# Patient Record
Sex: MALE | Race: WHITE | NOT HISPANIC OR LATINO | Employment: UNEMPLOYED | URBAN - NONMETROPOLITAN AREA
[De-identification: names, ages, dates, MRNs, and addresses within clinical notes are randomized per-mention and may not be internally consistent; named-entity substitution may affect disease eponyms.]

---

## 2017-10-07 ENCOUNTER — HOSPITAL ENCOUNTER (EMERGENCY)
Facility: HOSPITAL | Age: 37
Discharge: HOME | End: 2017-10-07
Attending: EMERGENCY MEDICINE | Admitting: NURSE PRACTITIONER

## 2017-10-07 VITALS
HEIGHT: 73 IN | BODY MASS INDEX: 32.47 KG/M2 | RESPIRATION RATE: 16 BRPM | DIASTOLIC BLOOD PRESSURE: 104 MMHG | WEIGHT: 245 LBS | OXYGEN SATURATION: 98 % | HEART RATE: 66 BPM | SYSTOLIC BLOOD PRESSURE: 152 MMHG | TEMPERATURE: 97.9 F

## 2017-10-07 DIAGNOSIS — M54.50 ACUTE MIDLINE LOW BACK PAIN WITHOUT SCIATICA: Primary | ICD-10-CM

## 2017-10-07 PROCEDURE — 96372 THER/PROPH/DIAG INJ SC/IM: CPT

## 2017-10-07 PROCEDURE — 25010000002 ORPHENADRINE CITRATE PER 60 MG: Performed by: NURSE PRACTITIONER

## 2017-10-07 PROCEDURE — 99283 EMERGENCY DEPT VISIT LOW MDM: CPT

## 2017-10-07 PROCEDURE — 25010000002 KETOROLAC TROMETHAMINE PER 15 MG: Performed by: NURSE PRACTITIONER

## 2017-10-07 RX ORDER — ORPHENADRINE CITRATE 30 MG/ML
60 INJECTION INTRAMUSCULAR; INTRAVENOUS ONCE
Status: COMPLETED | OUTPATIENT
Start: 2017-10-07 | End: 2017-10-07

## 2017-10-07 RX ORDER — ASPIRIN 81 MG/1
81 TABLET, CHEWABLE ORAL DAILY
COMMUNITY
End: 2019-01-03

## 2017-10-07 RX ORDER — ORPHENADRINE CITRATE 30 MG/ML
60 INJECTION INTRAMUSCULAR; INTRAVENOUS ONCE
Status: DISCONTINUED | OUTPATIENT
Start: 2017-10-07 | End: 2017-10-07

## 2017-10-07 RX ORDER — LISINOPRIL AND HYDROCHLOROTHIAZIDE 12.5; 1 MG/1; MG/1
1 TABLET ORAL DAILY
Qty: 15 TABLET | Refills: 0 | Status: SHIPPED | OUTPATIENT
Start: 2017-10-07 | End: 2021-08-18

## 2017-10-07 RX ORDER — LISINOPRIL AND HYDROCHLOROTHIAZIDE 12.5; 1 MG/1; MG/1
1 TABLET ORAL DAILY
COMMUNITY
End: 2019-01-03

## 2017-10-07 RX ORDER — NAPROXEN 500 MG/1
500 TABLET ORAL 2 TIMES DAILY PRN
Qty: 20 TABLET | Refills: 0 | OUTPATIENT
Start: 2017-10-07 | End: 2019-01-03

## 2017-10-07 RX ORDER — CYCLOBENZAPRINE HCL 10 MG
10 TABLET ORAL 3 TIMES DAILY PRN
Qty: 21 TABLET | Refills: 0 | OUTPATIENT
Start: 2017-10-07 | End: 2019-01-03

## 2017-10-07 RX ORDER — KETOROLAC TROMETHAMINE 30 MG/ML
60 INJECTION, SOLUTION INTRAMUSCULAR; INTRAVENOUS ONCE
Status: COMPLETED | OUTPATIENT
Start: 2017-10-07 | End: 2017-10-07

## 2017-10-07 RX ADMIN — ORPHENADRINE CITRATE 60 MG: 30 INJECTION INTRAMUSCULAR; INTRAVENOUS at 08:55

## 2017-10-07 RX ADMIN — KETOROLAC TROMETHAMINE 60 MG: 30 INJECTION, SOLUTION INTRAMUSCULAR at 08:55

## 2017-10-07 NOTE — ED PROVIDER NOTES
Subjective   Patient is a 37 y.o. male presenting with back pain.   History provided by:  Patient   used: No    Back Pain   Location:  Lumbar spine  Duration:  3 weeks  Context: lifting heavy objects    Relieved by:  Nothing  Worsened by:  Movement  Ineffective treatments:  OTC medications  Associated symptoms: no abdominal pain, no abdominal swelling, no bladder incontinence, no bowel incontinence, no chest pain, no fever, no headaches, no leg pain, no numbness, no paresthesias, no pelvic pain, no perianal numbness, no tingling, no weakness and no weight loss    Risk factors: no hx of cancer, no hx of osteoporosis, no lack of exercise, no menopause, not obese, not pregnant, no recent surgery, no steroid use and no vascular disease        Review of Systems   Constitutional: Negative.  Negative for fever and weight loss.   HENT: Negative.    Eyes: Negative.    Respiratory: Negative.    Cardiovascular: Negative.  Negative for chest pain.   Gastrointestinal: Negative.  Negative for abdominal pain and bowel incontinence.   Endocrine: Negative.    Genitourinary: Negative.  Negative for bladder incontinence and pelvic pain.   Musculoskeletal: Positive for back pain.   Allergic/Immunologic: Negative.    Neurological: Negative.  Negative for tingling, weakness, numbness, headaches and paresthesias.   Hematological: Negative.    Psychiatric/Behavioral: Negative.        Past Medical History:   Diagnosis Date   • Hypertension    • Knee pain, left        No Known Allergies    Past Surgical History:   Procedure Laterality Date   • CHOLECYSTECTOMY         Family History   Problem Relation Age of Onset   • Diabetes Brother    • Diabetes Mother        Social History     Social History   • Marital status: Single     Spouse name: N/A   • Number of children: N/A   • Years of education: N/A     Social History Main Topics   • Smoking status: Never Smoker   • Smokeless tobacco: Current User   • Alcohol use Yes       Comment: occasional    • Drug use: No   • Sexual activity: Defer     Other Topics Concern   • None     Social History Narrative   • None           Objective   Physical Exam   Constitutional: He is oriented to person, place, and time. He appears well-developed and well-nourished.   HENT:   Head: Normocephalic.   Right Ear: External ear normal.   Left Ear: External ear normal.   Mouth/Throat: Oropharynx is clear and moist.   Eyes: EOM are normal. Pupils are equal, round, and reactive to light.   Neck: Normal range of motion. Neck supple.   Cardiovascular: Normal rate and regular rhythm.    Pulmonary/Chest: Effort normal and breath sounds normal.   Abdominal: Soft. Bowel sounds are normal.   Musculoskeletal:        Lumbar back: He exhibits decreased range of motion, tenderness and pain.   Neurological: He is alert and oriented to person, place, and time.   Skin: Skin is warm and dry.   Psychiatric: He has a normal mood and affect. His behavior is normal.   Nursing note and vitals reviewed.      Procedures         ED Course  ED Course                  MDM    Final diagnoses:   Acute midline low back pain without sciatica            Quique Esquivel, APRN  10/07/17 0990

## 2019-01-03 ENCOUNTER — APPOINTMENT (OUTPATIENT)
Dept: CT IMAGING | Facility: HOSPITAL | Age: 39
End: 2019-01-03

## 2019-01-03 ENCOUNTER — HOSPITAL ENCOUNTER (EMERGENCY)
Facility: HOSPITAL | Age: 39
Discharge: HOME | End: 2019-01-03
Attending: FAMILY MEDICINE | Admitting: FAMILY MEDICINE

## 2019-01-03 VITALS
TEMPERATURE: 97.8 F | OXYGEN SATURATION: 99 % | HEIGHT: 72 IN | DIASTOLIC BLOOD PRESSURE: 85 MMHG | WEIGHT: 250 LBS | RESPIRATION RATE: 18 BRPM | SYSTOLIC BLOOD PRESSURE: 145 MMHG | BODY MASS INDEX: 33.86 KG/M2 | HEART RATE: 72 BPM

## 2019-01-03 DIAGNOSIS — M54.12 CERVICAL RADICULOPATHY: ICD-10-CM

## 2019-01-03 DIAGNOSIS — S16.1XXA STRAIN OF NECK MUSCLE, INITIAL ENCOUNTER: Primary | ICD-10-CM

## 2019-01-03 PROCEDURE — 72128 CT CHEST SPINE W/O DYE: CPT

## 2019-01-03 PROCEDURE — 72125 CT NECK SPINE W/O DYE: CPT | Performed by: RADIOLOGY

## 2019-01-03 PROCEDURE — 25010000002 KETOROLAC TROMETHAMINE PER 15 MG: Performed by: PHYSICIAN ASSISTANT

## 2019-01-03 PROCEDURE — 70450 CT HEAD/BRAIN W/O DYE: CPT | Performed by: RADIOLOGY

## 2019-01-03 PROCEDURE — 70450 CT HEAD/BRAIN W/O DYE: CPT

## 2019-01-03 PROCEDURE — 72125 CT NECK SPINE W/O DYE: CPT

## 2019-01-03 PROCEDURE — 96374 THER/PROPH/DIAG INJ IV PUSH: CPT

## 2019-01-03 PROCEDURE — 72128 CT CHEST SPINE W/O DYE: CPT | Performed by: RADIOLOGY

## 2019-01-03 PROCEDURE — 99284 EMERGENCY DEPT VISIT MOD MDM: CPT

## 2019-01-03 RX ORDER — ORPHENADRINE CITRATE 100 MG/1
100 TABLET, EXTENDED RELEASE ORAL 2 TIMES DAILY PRN
Qty: 15 TABLET | Refills: 0 | OUTPATIENT
Start: 2019-01-03 | End: 2020-09-17

## 2019-01-03 RX ORDER — SODIUM CHLORIDE 0.9 % (FLUSH) 0.9 %
10 SYRINGE (ML) INJECTION AS NEEDED
Status: DISCONTINUED | OUTPATIENT
Start: 2019-01-03 | End: 2019-01-03 | Stop reason: HOSPADM

## 2019-01-03 RX ORDER — KETOROLAC TROMETHAMINE 30 MG/ML
30 INJECTION, SOLUTION INTRAMUSCULAR; INTRAVENOUS ONCE
Status: COMPLETED | OUTPATIENT
Start: 2019-01-03 | End: 2019-01-03

## 2019-01-03 RX ORDER — ORPHENADRINE CITRATE 100 MG/1
100 TABLET, EXTENDED RELEASE ORAL ONCE
Status: COMPLETED | OUTPATIENT
Start: 2019-01-03 | End: 2019-01-03

## 2019-01-03 RX ORDER — ORPHENADRINE CITRATE 30 MG/ML
60 INJECTION INTRAMUSCULAR; INTRAVENOUS ONCE
Status: DISCONTINUED | OUTPATIENT
Start: 2019-01-03 | End: 2019-01-03

## 2019-01-03 RX ORDER — HYDROCODONE BITARTRATE AND ACETAMINOPHEN 5; 325 MG/1; MG/1
1 TABLET ORAL ONCE
Status: COMPLETED | OUTPATIENT
Start: 2019-01-03 | End: 2019-01-03

## 2019-01-03 RX ORDER — IBUPROFEN 800 MG/1
800 TABLET ORAL EVERY 6 HOURS PRN
Qty: 20 TABLET | Refills: 0 | OUTPATIENT
Start: 2019-01-03 | End: 2020-09-17

## 2019-01-03 RX ORDER — HYDROCODONE BITARTRATE AND ACETAMINOPHEN 5; 325 MG/1; MG/1
1 TABLET ORAL EVERY 6 HOURS PRN
Qty: 10 TABLET | Refills: 0 | OUTPATIENT
Start: 2019-01-03 | End: 2019-07-06 | Stop reason: HOSPADM

## 2019-01-03 RX ORDER — NAPROXEN 500 MG/1
500 TABLET ORAL 2 TIMES DAILY PRN
Qty: 20 TABLET | Refills: 0 | Status: SHIPPED | OUTPATIENT
Start: 2019-01-03 | End: 2019-01-03 | Stop reason: SDUPTHER

## 2019-01-03 RX ADMIN — KETOROLAC TROMETHAMINE 30 MG: 30 INJECTION, SOLUTION INTRAMUSCULAR; INTRAVENOUS at 13:43

## 2019-01-03 RX ADMIN — HYDROCODONE BITARTRATE AND ACETAMINOPHEN 1 TABLET: 5; 325 TABLET ORAL at 15:05

## 2019-01-03 RX ADMIN — ORPHENADRINE CITRATE 100 MG: 100 TABLET, EXTENDED RELEASE ORAL at 13:44

## 2019-01-03 NOTE — ED PROVIDER NOTES
Subjective   38-year-old white male presents secondary to mid back pain and neck pain.  He states he was feeling fine and was lifting a heavy piece of metal when he felt something snap in his lower neck/upper back.  Patient reports that he has had some tingling down both arms left greater than right.  No fever.  No lateralizing weakness.  He states that he has had somewhat of a headache though the popping sensation was in his neck/upper back.            Review of Systems   Constitutional: Negative.  Negative for fever.   HENT: Negative.    Respiratory: Negative.    Cardiovascular: Negative.  Negative for chest pain.   Gastrointestinal: Negative.  Negative for abdominal pain.   Endocrine: Negative.    Genitourinary: Negative.  Negative for dysuria.   Skin: Negative.    Neurological: Negative.    Psychiatric/Behavioral: Negative.    All other systems reviewed and are negative.      Past Medical History:   Diagnosis Date   • Hypertension    • Knee pain, left        No Known Allergies    Past Surgical History:   Procedure Laterality Date   • CHOLECYSTECTOMY         Family History   Problem Relation Age of Onset   • Diabetes Brother    • Diabetes Mother        Social History     Socioeconomic History   • Marital status: Single     Spouse name: Not on file   • Number of children: Not on file   • Years of education: Not on file   • Highest education level: Not on file   Tobacco Use   • Smoking status: Never Smoker   • Smokeless tobacco: Current User   Substance and Sexual Activity   • Alcohol use: Yes     Comment: occasional    • Drug use: No   • Sexual activity: Defer           Objective   Physical Exam   Constitutional: He is oriented to person, place, and time. He appears well-developed and well-nourished. No distress.   HENT:   Head: Normocephalic and atraumatic.   Right Ear: External ear normal.   Left Ear: External ear normal.   Nose: Nose normal.   Eyes: Conjunctivae and EOM are normal. Pupils are equal, round, and  reactive to light.   Neck: Normal range of motion. Neck supple. No JVD present. No tracheal deviation present.   Cardiovascular: Normal rate, regular rhythm and normal heart sounds.   No murmur heard.  Pulmonary/Chest: Effort normal and breath sounds normal. No respiratory distress. He has no wheezes.   Abdominal: Soft. Bowel sounds are normal. There is no tenderness.   Musculoskeletal: Normal range of motion. He exhibits no edema or deformity.   Tender to the left perispinal/subscapular region.  He is unable to flex and extend neck due to pain.  No focal midline cervical tenderness.   Neurological: He is alert and oriented to person, place, and time. No cranial nerve deficit.   Skin: Skin is warm and dry. No rash noted. He is not diaphoretic. No erythema. No pallor.   Psychiatric: He has a normal mood and affect. His behavior is normal. Thought content normal.   Nursing note and vitals reviewed.      Procedures           ED Course  ED Course as of Jan 03 1645   Thu Jan 03, 2019   1456 D/w Dr Eddie Dai at [facility].  He states he will see the patient on Monday at 2 PM in the office.  [JI]   1457 Patient feeling much better and disposition.  He states his numbness tingling had resolved.  He is able to move his neck more.  He is able to sit up without difficulty.  No new neurologic symptoms otherwise.  Air of his CT findings coronary nodules and the importance of follow-up.  He is also aware of the need for follow-up with neurosurgery.  He voices understanding.  [JI]      ED Course User Index  [JI] David Flannery PA                  MDM  Number of Diagnoses or Management Options  Cervical radiculopathy: new and requires workup  Strain of neck muscle, initial encounter: new and requires workup     Amount and/or Complexity of Data Reviewed  Clinical lab tests: ordered and reviewed  Tests in the radiology section of CPT®: reviewed and ordered  Discuss the patient with other providers: yes  Independent  visualization of images, tracings, or specimens: yes    Risk of Complications, Morbidity, and/or Mortality  Presenting problems: moderate          Final diagnoses:   Strain of neck muscle, initial encounter   Cervical radiculopathy            David Flannery PA  01/03/19 7393

## 2019-01-07 ENCOUNTER — TRANSCRIBE ORDERS (OUTPATIENT)
Dept: ADMINISTRATIVE | Facility: HOSPITAL | Age: 39
End: 2019-01-07

## 2019-01-07 DIAGNOSIS — M25.512 LEFT SHOULDER PAIN, UNSPECIFIED CHRONICITY: ICD-10-CM

## 2019-01-07 DIAGNOSIS — M54.9 BACK PAIN, UNSPECIFIED BACK LOCATION, UNSPECIFIED BACK PAIN LATERALITY, UNSPECIFIED CHRONICITY: ICD-10-CM

## 2019-01-07 DIAGNOSIS — M54.2 NECK PAIN: Primary | ICD-10-CM

## 2019-01-11 ENCOUNTER — HOSPITAL ENCOUNTER (OUTPATIENT)
Dept: MRI IMAGING | Facility: HOSPITAL | Age: 39
Discharge: HOME | End: 2019-01-11

## 2019-01-11 ENCOUNTER — HOSPITAL ENCOUNTER (OUTPATIENT)
Dept: MRI IMAGING | Facility: HOSPITAL | Age: 39
Discharge: HOME | End: 2019-01-11
Admitting: PHYSICIAN ASSISTANT

## 2019-01-11 DIAGNOSIS — M54.9 BACK PAIN, UNSPECIFIED BACK LOCATION, UNSPECIFIED BACK PAIN LATERALITY, UNSPECIFIED CHRONICITY: ICD-10-CM

## 2019-01-11 DIAGNOSIS — M25.512 LEFT SHOULDER PAIN, UNSPECIFIED CHRONICITY: ICD-10-CM

## 2019-01-11 DIAGNOSIS — M54.2 NECK PAIN: ICD-10-CM

## 2019-01-11 PROCEDURE — 72141 MRI NECK SPINE W/O DYE: CPT

## 2019-01-11 PROCEDURE — 72148 MRI LUMBAR SPINE W/O DYE: CPT | Performed by: RADIOLOGY

## 2019-01-11 PROCEDURE — 73221 MRI JOINT UPR EXTREM W/O DYE: CPT

## 2019-01-11 PROCEDURE — 72141 MRI NECK SPINE W/O DYE: CPT | Performed by: RADIOLOGY

## 2019-01-11 PROCEDURE — 73221 MRI JOINT UPR EXTREM W/O DYE: CPT | Performed by: RADIOLOGY

## 2019-01-11 PROCEDURE — 72148 MRI LUMBAR SPINE W/O DYE: CPT

## 2019-01-28 ENCOUNTER — TRANSCRIBE ORDERS (OUTPATIENT)
Dept: PHYSICAL THERAPY | Facility: HOSPITAL | Age: 39
End: 2019-01-28

## 2019-01-28 DIAGNOSIS — M50.20 CERVICAL DISC HERNIATION: Primary | ICD-10-CM

## 2019-01-29 ENCOUNTER — HOSPITAL ENCOUNTER (OUTPATIENT)
Dept: PHYSICAL THERAPY | Facility: HOSPITAL | Age: 39
Setting detail: THERAPIES SERIES
Discharge: HOME | End: 2019-01-29

## 2019-01-29 DIAGNOSIS — M50.20 CERVICAL DISC HERNIATION: Primary | ICD-10-CM

## 2019-01-29 PROCEDURE — G0283 ELEC STIM OTHER THAN WOUND: HCPCS | Performed by: PHYSICAL THERAPIST

## 2019-01-29 PROCEDURE — 97163 PT EVAL HIGH COMPLEX 45 MIN: CPT | Performed by: PHYSICAL THERAPIST

## 2019-01-29 PROCEDURE — 97010 HOT OR COLD PACKS THERAPY: CPT | Performed by: PHYSICAL THERAPIST

## 2019-01-29 NOTE — THERAPY EVALUATION
"    Outpatient Physical Therapy Ortho Initial Evaluation  [facility]     Patient Name: Wenceslao Nava  : 1980  MRN: [id number]  Today's Date: 2019      Visit Date: 2019    There is no problem list on file for this patient.       Past Medical History:   Diagnosis Date   • Hypertension    • Knee pain, left         Past Surgical History:   Procedure Laterality Date   • CHOLECYSTECTOMY         Visit Dx:     ICD-10-CM ICD-9-CM   1. Cervical disc herniation M50.20 722.0       Patient History     Row Name 19 1000             History    Chief Complaint  Pain;Numbness;Tinglings  -AD      Type of Pain  Neck pain  -AD      Date Current Problem(s) Began  19  -AD      Brief Description of Current Complaint  The patient reported bending over to lift a piece of metal at work. He states when he went to stand up he \"heard something pop\". He reported immediately neck pain as well as left upper extremity numbness and tingling. He stated he reported to [facility] ER. He stated he had an MRI performed on the neck, back, and shoulder. He reported having \"a bruise on the shoulder\" and a bulging disc in his neck. He reported he then saw a neurosurgeon who reported he would need surgery if \"an epidural does not help\". The patient reported the disc in his neck is \"shifted to the left, causing pain\". He stated he is unable to squeeze a soda can or lift his arm due to pain and weakness. He is scheduled to return to [facility] on May 6, 2019.  -AD      Patient/Caregiver Goals  Relieve pain;Return to prior level of function;Return to work;Improve mobility;Improve strength  -AD      Current Tobacco Use  None  -AD      Smoking Status  Non smoker  -AD      Patient's Rating of General Health  Fair  -AD      Occupation/sports/leisure activities  [employer], currently off due to injury.  -AD      Patient seeing anyone else for problem(s)?  Neurosurgeon  -AD      What clinical tests have you had for this problem?  " MRI  -AD      Results of Clinical Tests  C6-7 left bulging disc extending into neural foramen.  -AD         Pain     Pain Location  Neck;Arm;Back  -AD      Pain at Present  7  -AD      Pain at Best  5  -AD      Pain at Worst  10  -AD      Pain Frequency  Constant/continuous  -AD      Pain Description  Sharp;Shooting;Tingling  -AD      What Performance Factors Make the Current Problem(s) WORSE?  Carrying light objects, moving left upper extremity, bathing and dressing with RUE due to decreased LUE strength.  -AD      What Performance Factors Make the Current Problem(s) BETTER?  Pain medication  -AD      Is your sleep disturbed?  Yes  -AD      Is medication used to assist with sleep?  Yes  -AD      Total hours of sleep per night  3-4 hours per night  -AD      Difficulties at work?  Unable to work  -AD      Difficulties with ADL's?  Must perform with RUE.  -AD         Fall Risk Assessment    Any falls in the past year:  No  -AD         Daily Activities    Primary Language  English  -AD      Pt Participated in POC and Goals  Yes  -AD         Safety    Are you being hurt, hit, or frightened by anyone at home or in your life?  No  -AD      Are you being neglected by a caregiver  No  -AD        User Key  (r) = Recorded By, (t) = Taken By, (c) = Cosigned By    Initials Name Provider Type    AD Dalton, Ashley Claudene, PT Physical Therapist          PT Ortho     Row Name 01/29/19 1000       Posture/Observations    Posture- WNL  -- Flexed forward posture.  -AD       Neural Tension Signs- Upper Quarter Clearing    ULNTT 1  Left:;Negative  -AD    ULNTT 3  Left:;Postive  -AD    ULNTT 4  Left:;Negative  -AD       Sensory Screen for Light Touch- Upper Quarter Clearing    C4 (posterior shoulder)  Bilateral:;Intact  -AD    C5 (lateral upper arm)  Bilateral:;Intact  -AD    C6 (tip of thumb)  Left:;Diminished  -AD    C7 (tip of 3rd finger)  Bilateral:;Intact  -AD    C8 (tip of 5th finger)  Left:;Diminished  -AD    T1 (medial lower  arm)  Bilateral:;Intact  -AD       Myotomal Screen- Upper Quarter Clearing    Shoulder flexion (C5)  Right:;4+ (Good +);Left:;3- (Fair -)  -AD    Elbow flexion/wrist extension (C6)  Right:;4+ (Good +);Left:;3- (Fair -)  -AD    Elbow extension/wrist flexion (C7)  Right:;4+ (Good +);Left:;3- (Fair -)  -AD       Cervical/Shoulder ROM Screen    Cervical flexion  Impaired 20  -AD    Cervical extension  Impaired 30  -AD    Cervical lateral flexion  Impaired Right: 40, Left: 20  -AD    Cervical rotation  Impaired Right: 40, Left: 30  -AD       Special Tests/Palpation    Special Tests/Palpation  -- Tenderness C3-C7 spinous processes, left transverse proc.  -AD       Cervical Palpation    Cervical Palpation- Location?  -- Negative for changes in rad. symptoms, increased pain  -AD       Cervical/Thoracic Special Tests    Spurlings (Foraminal Compression)  Bilateral:;Negative  -AD    Cervical Compression (Forarminal Compression vs. Facet Pain)  Bilateral:;Negative  -AD    Cervical Distraction (Foraminal Compression vs. Facet Pain)  Bilateral:;Negative  -AD       General ROM    GENERAL ROM COMMENTS  Flexion: 85, Left: 20  -AD        Strength Right    # Reps  3  -AD    Right Rung  2  -AD    Right  Test 1  90  -AD    Right  Test 2  80  -AD    Right  Test 3  95  -AD     Strength Average Right  88.33  -AD        Strength Left    # Reps  3  -AD    Left Rung  2  -AD    Left  Test 1  20  -AD    Left  Test 2  20  -AD    Left  Test 3  15  -AD     Strength Average Left  18.33  -AD       Sensation    Sensation WNL?  -- Left impaired  -AD      User Key  (r) = Recorded By, (t) = Taken By, (c) = Cosigned By    Initials Name Provider Type    AD Dalton, Ashley Claudene, PT Physical Therapist                      Therapy Education  Given: HEP, Symptoms/condition management, Pain management, Posture/body mechanics  Program: New  How Provided: Verbal, Demonstration  Provided to: Patient  Level of  Understanding: Teach back education performed, Verbalized, Demonstrated     PT OP Goals     Row Name 01/29/19 1100          PT Short Term Goals    STG Date to Achieve  02/12/19  -AD     STG 1  Pt will be instructed in HEP for improved independence.  -AD     STG 1 Progress  New  -AD     STG 2  Pt will report less than 60% impairment on the NDI for improved independence.  -AD     STG 2 Progress  New  -AD     STG 3  Pt will demonstrate a 5 degree improvement in cervical ROM.  -AD     STG 3 Progress  New  -AD        Long Term Goals    LTG Date to Achieve  02/28/19  -AD     LTG 1  Pt will report a maximum of 7/10 cervical pain.  -AD     LTG 1 Progress  New  -AD     LTG 2  Pt will report less than 50% impairment on the NDI for improved independence.  -AD     LTG 2 Progress  New  -AD     LTG 3  Pt will demonstrate a 10 degree improvement in cervical ROM for improved independence.  -AD     LTG 3 Progress  New  -AD     LTG 4  Pt will demonstrate a 20 pound improvement in left  strength for improved ability to hold and carry objects.  -AD     LTG 4 Progress  New  -AD        Time Calculation    PT Goal Re-Cert Due Date  02/28/19  -AD       User Key  (r) = Recorded By, (t) = Taken By, (c) = Cosigned By    Initials Name Provider Type    AD Dalton, Ashley Claudene, PT Physical Therapist          PT Assessment/Plan     Row Name 01/29/19 1317 01/29/19 1123       PT Assessment    Functional Limitations  --  Decreased safety during functional activities;Limitation in home management;Limitations in community activities;Performance in work activities;Performance in self-care ADL;Performance in leisure activities  -AD    Impairments  --  Endurance;Range of motion;Posture;Poor body mechanics;Pain;Muscle strength;Sensation;Joint integrity;Joint mobility  -AD    Assessment Comments  The patient is a 38 year old male presenting to the clinic with neck and left arm pain/numbness. He demonstrated impaired cervical ROM and left upper  extremity weakness. ULTT of the left radial nerve was positive, with increased radicular pain occuring with testing. The patient reported 74% impairment on the NDI, reporting difficulty with daily and community activity.  strength testing revealed weakness, with an average 88.33# on the right and 18.33# on the left. He would benefit from skilled physical therapy to address functional limitations and impairments. He will be progressed as tolerated to improve functional limitations and impairments.  -AD  --    Please refer to paper survey for additional self-reported information  Yes  -AD  --    Rehab Potential  Fair  -AD  --    Patient/caregiver participated in establishment of treatment plan and goals  Yes  -AD  --    Patient would benefit from skilled therapy intervention  Yes  -AD  --       PT Plan    PT Frequency  3x/week;4x/week  -AD  --    Predicted Duration of Therapy Intervention (Therapy Eval)  6 weeks  -AD  --    Planned CPT's?  PT EVAL HIGH COMPLEXITY: 20310;PT THER ACT EA 15 MIN: 54345;PT THER PROC EA 15 MIN: 26404;PT RE-EVAL: 20643;PT MANUAL THERAPY EA 15 MIN: 49826;PT NEUROMUSC RE-EDUCATION EA 15 MIN: 32182;PT SELF CARE/HOME MGMT/TRAIN EA 15: 41841;PT ELECTRICAL STIM UNATTEND: ;PT ELECTRICAL STIM ATTD EA 15 MIN: 24281;PT TRACTION CERVICAL: 68379;PT ULTRASOUND EA 15 MIN: 15903;PT HOT/COLD PACK WC NONMCARE: 22034;PT IONTOPHORESIS EA 15 MIN: 18999;PT THER SUPP EA 15 MIN  -AD  --    Physical Therapy Interventions (Optional Details)  fine motor skills;gross motor skills;neuromuscular re-education;motor coordination training;modalities;manual therapy techniques;joint mobilization;home exercise program;patient/family education;postural re-education;ROM (Range of Motion);strengthening;stretching  -AD  --    PT Plan Comments  The above noted interventions will be used to address functional limitations and impairments. He will be progressed as tolerated.  -AD  --      User Key  (r) = Recorded By, (t) =  Taken By, (c) = Cosigned By    Initials Name Provider Type    AD Dalton, Ashley Claudene, PT Physical Therapist          Modalities     Row Name 01/29/19 1100             Moist Heat    MH Applied  Yes With IFC, no skin irritation observed.  -AD      Location  Bilateral upper trapezius  -AD      Rx Minutes  10 mins  -AD      MH S/P Rx  Yes  -AD         ELECTRICAL STIMULATION    Attended/Unattended  Unattended  -AD      Stimulation Type  IFC  -AD      Max mAmp  -- Per pt tolerance  -AD      Location/Electrode Placement/Other  Cervical  -AD       PT E-Stim Unattended (Manual) Minutes  10  -AD        User Key  (r) = Recorded By, (t) = Taken By, (c) = Cosigned By    Initials Name Provider Type    AD Dalton, Ashley Claudene, PT Physical Therapist                          Outcome Measure Options: Neck Disability Index (NDI)  Neck Disability Index  Section 1 - Pain Intensity: The pain is very severe at the moment.  Section 2 - Personal Care: I need some help but manage most of my personal care.  Section 3 - Lifting: Pain prevents me from lifting heavy weights, but I can manage light weights if they are conveniently positioned.  Section 4 - Work: I can hardly do any work at all.  Section 5 - Headaches: I have moderate headaches that come infrequently.  Section 6 - Concentration: I have a great deal of difficulty concentrating  Section 7 - Sleeping: My sleep is greatly disturbed for up to 3-5 hours.  Section 8 - Driving: I can hardly drive at all because of severe neck pain.  Section 9 - Reading: I can't read at all.  Section 10 - Recreation: I can hardly do recreational activities due to neck pain.  Neck Disability Index Score: 37  Neck Disability Index Comments: 74% impaired      Time Calculation:     Therapy Suggested Charges     Code   Minutes Charges    33309 (CPT®) Hc Pt Neuromusc Re Education Ea 15 Min      23606 (CPT®) Hc Pt Ther Proc Ea 15 Min      93093 (CPT®) Hc Gait Training Ea 15 Min      65732 (CPT®) Hc Pt  Therapeutic Act Ea 15 Min      17423 (CPT®) Hc Pt Manual Therapy Ea 15 Min      80762 (CPT®) Hc Pt Ther Massage- Per 15 Min      46168 (CPT®) Hc Pt Iontophoresis Ea 15 Min      69122 (CPT®) Hc Pt Elec Stim Ea-Per 15 Min      71121 (CPT®) Hc Pt Ultrasound Ea 15 Min      48027 (CPT®) Hc Pt Self Care/Mgmt/Train Ea 15 Min      17909 (CPT®) Hc Pt Prosthetic (S) Train Initial Encounter, Each 15 Min      44822 (CPT®) Hc Orthotic(S) Mgmt/Train Initial Encounter, Each 15min      21721 (CPT®) Hc Pt Aquatic Therapy Ea 15 Min      72509 (CPT®) Hc Pt Orthotic(S)/Prosthetic(S) Encounter, Each 15 Min       (CPT®) Hc Pt Electrical Stim Unattended 10 1    Total  10 1          Start Time: 1020  Stop Time: 1130  Time Calculation (min): 70 min     Therapy Charges for Today     Code Description Service Date Service Provider Modifiers Qty    30667612711 HC PT ELECTRICAL STIM UNATTENDED 1/29/2019 Dalton, Ashley Claudene, PT  1    70964598706 HC PT EVAL HIGH COMPLEXITY 4 1/29/2019 Dalton, Ashley Claudene, PT GP 1    11521003970 HC PT HOT/COLD PACK WC NONMCARE 1/29/2019 Dalton, Ashley Claudene, PT GP 1          PT G-Codes  Outcome Measure Options: Neck Disability Index (NDI)  Neck Disability Index Score: 37         Ashley Claudene Dalton, PT  1/29/2019

## 2019-01-30 ENCOUNTER — HOSPITAL ENCOUNTER (OUTPATIENT)
Dept: PHYSICAL THERAPY | Facility: HOSPITAL | Age: 39
Setting detail: THERAPIES SERIES
Discharge: HOME | End: 2019-01-30

## 2019-01-30 DIAGNOSIS — M50.20 CERVICAL DISC HERNIATION: Primary | ICD-10-CM

## 2019-01-30 PROCEDURE — 97140 MANUAL THERAPY 1/> REGIONS: CPT

## 2019-01-30 PROCEDURE — 97110 THERAPEUTIC EXERCISES: CPT

## 2019-01-30 NOTE — THERAPY TREATMENT NOTE
Outpatient Physical Therapy Ortho Treatment Note   [facility]     Patient Name: Wenceslao Nava  : 1980  MRN: [id number]  Today's Date: 2019      Visit Date: 2019    Visit Dx:    ICD-10-CM ICD-9-CM   1. Cervical disc herniation M50.20 722.0       There is no problem list on file for this patient.       Past Medical History:   Diagnosis Date   • Hypertension    • Knee pain, left         Past Surgical History:   Procedure Laterality Date   • CHOLECYSTECTOMY         PT Ortho     Row Name 19 1600       Subjective Comments    Subjective Comments  Patient arrives to therapy w/ reports of 5/10 neck pain.  Pt states the pain goes from his neck into his L) arm.    -JULIA       Subjective Pain    Able to rate subjective pain?  yes  -JULIA    Pre-Treatment Pain Level  5  -JULIA    Post-Treatment Pain Level  4  -JULIA    Row Name 19 1000       Posture/Observations    Posture- WNL  -- Flexed forward posture.  -AD       Neural Tension Signs- Upper Quarter Clearing    ULNTT 1  Left:;Negative  -AD    ULNTT 3  Left:;Postive  -AD    ULNTT 4  Left:;Negative  -AD       Sensory Screen for Light Touch- Upper Quarter Clearing    C4 (posterior shoulder)  Bilateral:;Intact  -AD    C5 (lateral upper arm)  Bilateral:;Intact  -AD    C6 (tip of thumb)  Left:;Diminished  -AD    C7 (tip of 3rd finger)  Bilateral:;Intact  -AD    C8 (tip of 5th finger)  Left:;Diminished  -AD    T1 (medial lower arm)  Bilateral:;Intact  -AD       Myotomal Screen- Upper Quarter Clearing    Shoulder flexion (C5)  Right:;4+ (Good +);Left:;3- (Fair -)  -AD    Elbow flexion/wrist extension (C6)  Right:;4+ (Good +);Left:;3- (Fair -)  -AD    Elbow extension/wrist flexion (C7)  Right:;4+ (Good +);Left:;3- (Fair -)  -AD       Cervical/Shoulder ROM Screen    Cervical flexion  Impaired 20  -AD    Cervical extension  Impaired 30  -AD    Cervical lateral flexion  Impaired Right: 40, Left: 20  -AD    Cervical rotation  Impaired Right: 40, Left: 30  -AD        Special Tests/Palpation    Special Tests/Palpation  -- Tenderness C3-C7 spinous processes, left transverse proc.  -AD       Cervical Palpation    Cervical Palpation- Location?  -- Negative for changes in rad. symptoms, increased pain  -AD       Cervical/Thoracic Special Tests    Spurlings (Foraminal Compression)  Bilateral:;Negative  -AD    Cervical Compression (Forarminal Compression vs. Facet Pain)  Bilateral:;Negative  -AD    Cervical Distraction (Foraminal Compression vs. Facet Pain)  Bilateral:;Negative  -AD       General ROM    GENERAL ROM COMMENTS  Flexion: 85, Left: 20  -AD        Strength Right    # Reps  3  -AD    Right Rung  2  -AD    Right  Test 1  90  -AD    Right  Test 2  80  -AD    Right  Test 3  95  -AD     Strength Average Right  88.33  -AD        Strength Left    # Reps  3  -AD    Left Rung  2  -AD    Left  Test 1  20  -AD    Left  Test 2  20  -AD    Left  Test 3  15  -AD     Strength Average Left  18.33  -AD       Sensation    Sensation WNL?  -- Left impaired  -AD      User Key  (r) = Recorded By, (t) = Taken By, (c) = Cosigned By    Initials Name Provider Type    AD Dalton, Ashley Claudene, PT Physical Therapist    Jessika Chi, PTA Physical Therapy Assistant                      PT Assessment/Plan     Row Name 01/30/19 2831          PT Assessment    Assessment Comments  Patient tolerated treatment fair today with decreased pain noted following session, 4/10.  Treatment initiated w/ MH and Estim, (estim placed on hold after 5 min secondary to pts request due to discomfort); pts symptoms improved.  Pt performed gentle therex as to his tolerance to assist w/ improved mobility and cervical stability.  Session concluded with manual STM and US.  Discussed pt's POC with supervising therapist Lizette Webb, PT, DPT.  Pt will be progressed as tolerated.    -JULIA        PT Plan    PT Plan Comments  Continue with PT's POC and progress tx as tolerated by  "patient.   -JULIA       User Key  (r) = Recorded By, (t) = Taken By, (c) = Cosigned By    Initials Name Provider Type    Jessika Chi PTA Physical Therapy Assistant          Modalities     Row Name 01/30/19 1600             Moist Heat    MH Applied  Yes no redness observed following MH  -JULIA      Location  Bilateral upper trapezius  -JULIA      Rx Minutes  15 mins  -JULIA      MH Prior to Rx  Yes w/ estim in seated position  -JULIA         Ultrasound 98905    Location  bilateral UT region  -JULIA      Duty Cycle  100  -JULIA      Frequency  -- 3.3MHz  -JULIA      Intensity - Wts/cm  1.2  -JULIA      86952 - PT Ultrasound Minutes  8  -JULIA         ELECTRICAL STIMULATION    Attended/Unattended  Unattended no skin irritation observed following  -JULIA      Stimulation Type  IFC  -JULIA      Max mAmp  -- as to pt's tolerance  -JULIA      Location/Electrode Placement/Other  Cervical  -JULIA        User Key  (r) = Recorded By, (t) = Taken By, (c) = Cosigned By    Initials Name Provider Type    Jessika Chi PTA Physical Therapy Assistant          Exercises     Row Name 01/30/19 1600 01/30/19 1500          Subjective Comments    Subjective Comments  Patient arrives to therapy w/ reports of 5/10 neck pain.  Pt states the pain goes from his neck into his L) arm.    -JULIA  --        Subjective Pain    Able to rate subjective pain?  yes  -JULIA  --     Pre-Treatment Pain Level  5  -JULIA  --     Post-Treatment Pain Level  4  -JULIA  --        Total Minutes    60813 - PT Therapeutic Exercise Minutes  15  -JULIA  --     91851 - PT Manual Therapy Minutes  --  10  -JULIA        Exercise 1    Exercise Name 1  UT stretch 3x20\", lev scap stretch 3x20\", cervical retraction (supine) x15, cervical range of motion (gentle) x10, scap squeeze 10x2  -JULIA  --     Cueing 1  Verbal;Tactile;Demo  -JULIA  --     Time 1  15 min  -JULIA  --       User Key  (r) = Recorded By, (t) = Taken By, (c) = Cosigned By    Initials Name Provider Type    Jessika Chi PTA Physical " Therapy Assistant                        Manual Rx (last 36 hours)      Manual Treatments     Row Name 01/30/19 1500             Total Minutes    20308 - PT Manual Therapy Minutes  10  -JULIA         Manual Rx 1    Manual Rx 1 Location  cervical, B) UT region  -JULIA      Manual Rx 1 Type  STM  -JULIA      Manual Rx 1 Grade  gentle, as to pt's tolerance  -JULIA      Manual Rx 1 Duration  10 min  -JULIA        User Key  (r) = Recorded By, (t) = Taken By, (c) = Cosigned By    Initials Name Provider Type    Jessika Chi, PTA Physical Therapy Assistant              Therapy Education  Given: HEP, Symptoms/condition management, Pain management, Posture/body mechanics  Program: Reinforced  How Provided: Verbal, Demonstration  Provided to: Patient  Level of Understanding: Teach back education performed, Verbalized, Demonstrated              Time Calculation:   Start Time: 1455  Stop Time: 1600  Time Calculation (min): 65 min  Therapy Suggested Charges     Code   Minutes Charges    99122 (CPT®) Hc Pt Neuromusc Re Education Ea 15 Min      90979 (CPT®) Hc Pt Ther Proc Ea 15 Min 15 1    11954 (CPT®) Hc Gait Training Ea 15 Min      75252 (CPT®) Hc Pt Therapeutic Act Ea 15 Min      56840 (CPT®) Hc Pt Manual Therapy Ea 15 Min 10 1    08303 (CPT®) Hc Pt Ther Massage- Per 15 Min      10293 (CPT®) Hc Pt Iontophoresis Ea 15 Min      54190 (CPT®) Hc Pt Elec Stim Ea-Per 15 Min      03039 (CPT®) Hc Pt Ultrasound Ea 15 Min 8     28814 (CPT®) Hc Pt Self Care/Mgmt/Train Ea 15 Min      51611 (CPT®) Hc Pt Prosthetic (S) Train Initial Encounter, Each 15 Min      45874 (CPT®) Hc Orthotic(S) Mgmt/Train Initial Encounter, Each 15min      56656 (CPT®) Hc Pt Aquatic Therapy Ea 15 Min      59935 (CPT®) Hc Pt Orthotic(S)/Prosthetic(S) Encounter, Each 15 Min       (CPT®) Hc Pt Electrical Stim Unattended      Total  33 2        Therapy Charges for Today     Code Description Service Date Service Provider Modifiers Qty    60879984688 HC PT THER PROC  EA 15 MIN 1/30/2019 Jessika Cotto, PTA GP 1    59167709337 HC PT MANUAL THERAPY EA 15 MIN 1/30/2019 Jessika Cotto, BRAULIO GP 1                    Jessika Allen. BRAULIO Cotto  1/30/2019

## 2019-02-01 ENCOUNTER — HOSPITAL ENCOUNTER (OUTPATIENT)
Dept: PHYSICAL THERAPY | Facility: HOSPITAL | Age: 39
Setting detail: THERAPIES SERIES
Discharge: HOME | End: 2019-02-01

## 2019-02-01 DIAGNOSIS — M50.20 CERVICAL DISC HERNIATION: Primary | ICD-10-CM

## 2019-02-01 PROCEDURE — 97035 APP MDLTY 1+ULTRASOUND EA 15: CPT

## 2019-02-01 PROCEDURE — 97110 THERAPEUTIC EXERCISES: CPT

## 2019-02-01 PROCEDURE — 97140 MANUAL THERAPY 1/> REGIONS: CPT

## 2019-02-01 NOTE — THERAPY TREATMENT NOTE
Outpatient Physical Therapy Ortho Treatment Note   [facility]     Patient Name: Wenceslao Nava  : 1980  MRN: [id number]  Today's Date: 2019      Visit Date: 2019    Visit Dx:    ICD-10-CM ICD-9-CM   1. Cervical disc herniation M50.20 722.0       There is no problem list on file for this patient.       Past Medical History:   Diagnosis Date   • Hypertension    • Knee pain, left         Past Surgical History:   Procedure Laterality Date   • CHOLECYSTECTOMY         PT Ortho     Row Name 19 0800       Subjective Comments    Subjective Comments  Patient reports that he is continuing to have pain in his neck. Patient states that his back pain is worse. Patient reports that he continues to have pain in the neck into his left arm.   -AC       Subjective Pain    Able to rate subjective pain?  yes  -AC    Pre-Treatment Pain Level  5  -AC    Post-Treatment Pain Level  4  -AC    Row Name 19 1600       Subjective Comments    Subjective Comments  Patient arrives to therapy w/ reports of 5/10 neck pain.  Pt states the pain goes from his neck into his L) arm.    -JULIA       Subjective Pain    Able to rate subjective pain?  yes  -JULIA    Pre-Treatment Pain Level  5  -JULIA    Post-Treatment Pain Level  4  -JULIA    Row Name 19 1000       Posture/Observations    Posture- WNL  -- Flexed forward posture.  -AD       Neural Tension Signs- Upper Quarter Clearing    ULNTT 1  Left:;Negative  -AD    ULNTT 3  Left:;Postive  -AD    ULNTT 4  Left:;Negative  -AD       Sensory Screen for Light Touch- Upper Quarter Clearing    C4 (posterior shoulder)  Bilateral:;Intact  -AD    C5 (lateral upper arm)  Bilateral:;Intact  -AD    C6 (tip of thumb)  Left:;Diminished  -AD    C7 (tip of 3rd finger)  Bilateral:;Intact  -AD    C8 (tip of 5th finger)  Left:;Diminished  -AD    T1 (medial lower arm)  Bilateral:;Intact  -AD       Myotomal Screen- Upper Quarter Clearing    Shoulder flexion (C5)  Right:;4+ (Good +);Left:;3- (Fair -)   -AD    Elbow flexion/wrist extension (C6)  Right:;4+ (Good +);Left:;3- (Fair -)  -AD    Elbow extension/wrist flexion (C7)  Right:;4+ (Good +);Left:;3- (Fair -)  -AD       Cervical/Shoulder ROM Screen    Cervical flexion  Impaired 20  -AD    Cervical extension  Impaired 30  -AD    Cervical lateral flexion  Impaired Right: 40, Left: 20  -AD    Cervical rotation  Impaired Right: 40, Left: 30  -AD       Special Tests/Palpation    Special Tests/Palpation  -- Tenderness C3-C7 spinous processes, left transverse proc.  -AD       Cervical Palpation    Cervical Palpation- Location?  -- Negative for changes in rad. symptoms, increased pain  -AD       Cervical/Thoracic Special Tests    Spurlings (Foraminal Compression)  Bilateral:;Negative  -AD    Cervical Compression (Forarminal Compression vs. Facet Pain)  Bilateral:;Negative  -AD    Cervical Distraction (Foraminal Compression vs. Facet Pain)  Bilateral:;Negative  -AD       General ROM    GENERAL ROM COMMENTS  Flexion: 85, Left: 20  -AD        Strength Right    # Reps  3  -AD    Right Rung  2  -AD    Right  Test 1  90  -AD    Right  Test 2  80  -AD    Right  Test 3  95  -AD     Strength Average Right  88.33  -AD        Strength Left    # Reps  3  -AD    Left Rung  2  -AD    Left  Test 1  20  -AD    Left  Test 2  20  -AD    Left  Test 3  15  -AD     Strength Average Left  18.33  -AD       Sensation    Sensation WNL?  -- Left impaired  -AD      User Key  (r) = Recorded By, (t) = Taken By, (c) = Cosigned By    Initials Name Provider Type    AD Dalton, Ashley Claudene, PT Physical Therapist    Jessika Chi, PTA Physical Therapy Assistant    Lizette Starr PTA Physical Therapy Assistant                      PT Assessment/Plan     Row Name 02/01/19 0906          PT Assessment    Assessment Comments  Gripper added to treatment session to help improve  strength. Patient tolerated treatment session well with rest  breaks taken as needed by the patient. Educated patient to perform ther-ex per his tolerance, patient verbalized understanding. No adverse reactions with modalities or treatment session. Patient requested to attempt estim again on cervical spine, patient had disomfort 5 minutes into estim and estim was stopped at that time. Decrease in pain noted following treatment session.   -AC        PT Plan    PT Plan Comments  Continue per PT's POC, progress per the patient's tolerance.  -AC       User Key  (r) = Recorded By, (t) = Taken By, (c) = Cosigned By    Initials Name Provider Type    Lizette Starr PTA Physical Therapy Assistant          Modalities     Row Name 02/01/19 0800             Moist Heat    MH Applied  Yes No redness noted following moist heat  -AC      Location  Bilateral upper trapezius  -AC      Rx Minutes  15 mins  -AC      MH Prior to Rx  Yes  -AC         Ultrasound 44110    Location  bilateral UT region  -AC      Duty Cycle  100  -AC      Frequency  -- 3.3MHz  -AC      Intensity - Wts/cm  1.2  -AC      81947 - PT Ultrasound Minutes  8  -AC         ELECTRICAL STIMULATION    Attended/Unattended  Unattended No irrittaion noted following estim  -AC      Stimulation Type  IFC  -AC      Max mAmp  -- per the patient's tolerance, 5 minutes with MH  -AC      Location/Electrode Placement/Other  Cervical  -AC       PT E-Stim Unattended (Manual) Minutes  5  -AC        User Key  (r) = Recorded By, (t) = Taken By, (c) = Cosigned By    Initials Name Provider Type    Lizette Starr PTA Physical Therapy Assistant          Exercises     Row Name 02/01/19 0900 02/01/19 0800          Subjective Comments    Subjective Comments  --  Patient reports that he is continuing to have pain in his neck. Patient states that his back is hurting him more.   -AC        Subjective Pain    Able to rate subjective pain?  --  yes  -AC     Pre-Treatment Pain Level  --  5  -AC     Post-Treatment Pain Level  --  4   -AC        Total Minutes    35844 - PT Therapeutic Exercise Minutes  --  15  -AC     08756 - PT Manual Therapy Minutes  10  -AC  --        Exercise 1    Exercise Name 1  --  UT stretch 20 sec hold x3, CROM (flex, ext, rot) x10 each, scap squeeze x15, levator scap stretch 20 sec hold x3, gripper (yellow) 2 min  -AC     Cueing 1  --  Verbal;Tactile;Demo  -AC     Time 1  --  15 minutes  -AC       User Key  (r) = Recorded By, (t) = Taken By, (c) = Cosigned By    Initials Name Provider Type     Lizette Barros, BRAULIO Physical Therapy Assistant                        Manual Rx (last 36 hours)      Manual Treatments     Row Name 02/01/19 0900             Total Minutes    93443 - PT Manual Therapy Minutes  10  -AC         Manual Rx 1    Manual Rx 1 Location  cervical, B) UT region  -AC      Manual Rx 1 Type  STM  -AC      Manual Rx 1 Grade  gentle, as to pt's tolerance  -AC      Manual Rx 1 Duration  10 min  -AC        User Key  (r) = Recorded By, (t) = Taken By, (c) = Cosigned By    Initials Name Provider Type     Lizette Barros, BRAULIO Physical Therapy Assistant              Therapy Education  Given: HEP, Symptoms/condition management, Pain management, Posture/body mechanics  Program: Reinforced, New  How Provided: Verbal, Demonstration  Provided to: Patient  Level of Understanding: Verbalized, Demonstrated              Time Calculation:   Start Time: 0800  Stop Time: 0902  Time Calculation (min): 62 min  Therapy Suggested Charges     Code   Minutes Charges    34554 (CPT®) Hc Pt Neuromusc Re Education Ea 15 Min      44796 (CPT®) Hc Pt Ther Proc Ea 15 Min 15 1    52875 (CPT®) Hc Gait Training Ea 15 Min      74037 (CPT®) Hc Pt Therapeutic Act Ea 15 Min      57378 (CPT®) Hc Pt Manual Therapy Ea 15 Min 10 1    21471 (CPT®) Hc Pt Ther Massage- Per 15 Min      81682 (CPT®) Hc Pt Iontophoresis Ea 15 Min      94033 (CPT®) Hc Pt Elec Stim Ea-Per 15 Min      63930 (CPT®) Hc Pt Ultrasound Ea 15 Min 8 1    07903 (CPT®)  Hc Pt Self Care/Mgmt/Train Ea 15 Min      20129 (CPT®) Hc Pt Prosthetic (S) Train Initial Encounter, Each 15 Min      60713 (CPT®) Hc Orthotic(S) Mgmt/Train Initial Encounter, Each 15min      23613 (CPT®) Hc Pt Aquatic Therapy Ea 15 Min      29115 (CPT®) Hc Pt Orthotic(S)/Prosthetic(S) Encounter, Each 15 Min       (CPT®) Hc Pt Electrical Stim Unattended 5     Total  38 3        Therapy Charges for Today     Code Description Service Date Service Provider Modifiers Qty    19530138941 HC PT THER PROC EA 15 MIN 2/1/2019 Lizette Barros, PTA GP 1    03077454251 HC PT MANUAL THERAPY EA 15 MIN 2/1/2019 Lizette Barros, PTA GP 1    57191210422 HC PT ULTRASOUND EA 15 MIN 2/1/2019 Lizette Barros, PTA GP 1                    Lizette Barros, PTA  2/1/2019

## 2019-02-04 ENCOUNTER — HOSPITAL ENCOUNTER (OUTPATIENT)
Dept: PHYSICAL THERAPY | Facility: HOSPITAL | Age: 39
Setting detail: THERAPIES SERIES
Discharge: HOME | End: 2019-02-04

## 2019-02-04 DIAGNOSIS — M50.20 CERVICAL DISC HERNIATION: Primary | ICD-10-CM

## 2019-02-04 PROCEDURE — 97035 APP MDLTY 1+ULTRASOUND EA 15: CPT | Performed by: PHYSICAL THERAPIST

## 2019-02-04 PROCEDURE — 97140 MANUAL THERAPY 1/> REGIONS: CPT | Performed by: PHYSICAL THERAPIST

## 2019-02-04 PROCEDURE — 97110 THERAPEUTIC EXERCISES: CPT | Performed by: PHYSICAL THERAPIST

## 2019-02-04 NOTE — THERAPY TREATMENT NOTE
Outpatient Physical Therapy Ortho Treatment Note   [facility]     Patient Name: Wenceslao Nava  : 1980  MRN: [id number]  Today's Date: 2019      Visit Date: 2019    Visit Dx:    ICD-10-CM ICD-9-CM   1. Cervical disc herniation M50.20 722.0       There is no problem list on file for this patient.       Past Medical History:   Diagnosis Date   • Hypertension    • Knee pain, left         Past Surgical History:   Procedure Laterality Date   • CHOLECYSTECTOMY         PT Ortho     Row Name 19 1100       Subjective Comments    Subjective Comments  Pt reported increased neck pain prior to today's treatment session.  -AD       Subjective Pain    Able to rate subjective pain?  yes  -AD    Pre-Treatment Pain Level  5  -AD    Post-Treatment Pain Level  8  -AD      User Key  (r) = Recorded By, (t) = Taken By, (c) = Cosigned By    Initials Name Provider Type    AD Dalton, Ashley Claudene, PT Physical Therapist                      PT Assessment/Plan     Row Name 19 1138          PT Assessment    Assessment Comments  Today's session was initiated by Jessika Cotto PTA. The session was initiated with moist heat combined with premod estim in the supine position. However, estim was ended secondary to discomfort reported by patient. Therapeutic exercises addressed cervical ROM, strength, and scapular stability. STM was performed on the left upper trapezius, with trigger points revealed and addressed. The session concluded with therapeutic ultrasound to the left upper trapezius. No skin irritation was observed following modalities. He will be progressed as tolerated.  -AD        PT Plan    PT Plan Comments  Progress as tolerated per POC.  -AD       User Key  (r) = Recorded By, (t) = Taken By, (c) = Cosigned By    Initials Name Provider Type    AD Dalton, Ashley Claudene, PT Physical Therapist          Modalities     Row Name 19 1100             Moist Heat    MH Applied  Yes No skin irritation  observed.  -AD      Location  Bilateral upper trapezius  -AD      Rx Minutes  10 mins  -AD      MH Prior to Rx  Yes  -AD         Ultrasound 83537    Location  bilateral UT region  -AD      Duty Cycle  100  -AD      Frequency  -- 3.3MHz  -AD      Intensity - Wts/cm  1.2  -AD      75279 - PT Ultrasound Minutes  8  -AD        User Key  (r) = Recorded By, (t) = Taken By, (c) = Cosigned By    Initials Name Provider Type    AD Dalton, Ashley Claudene, PT Physical Therapist          Exercises     Row Name 02/04/19 1100             Subjective Comments    Subjective Comments  Pt reported increased neck pain prior to today's treatment session.  -AD         Subjective Pain    Able to rate subjective pain?  yes  -AD      Pre-Treatment Pain Level  5  -AD      Post-Treatment Pain Level  8  -AD         Total Minutes    30204 - PT Therapeutic Exercise Minutes  15  -AD      35789 - PT Manual Therapy Minutes  18  -AD         Exercise 1    Exercise Name 1  UT stretch, levator scap stretch, CROM, scapular squeeze, yellow gripper.  -AD      Cueing 1  Verbal;Tactile;Demo  -AD      Time 1  15 minutes  -AD        User Key  (r) = Recorded By, (t) = Taken By, (c) = Cosigned By    Initials Name Provider Type    AD Dalton, Ashley Claudene, PT Physical Therapist                        Manual Rx (last 36 hours)      Manual Treatments     Row Name 02/04/19 1100             Total Minutes    01324 - PT Manual Therapy Minutes  18  -AD         Manual Rx 1    Manual Rx 1 Location  cervical, B) UT region  -AD      Manual Rx 1 Type  STM  -AD      Manual Rx 1 Grade  gentle, as to pt's tolerance  -AD      Manual Rx 1 Duration  18 minutes  -AD        User Key  (r) = Recorded By, (t) = Taken By, (c) = Cosigned By    Initials Name Provider Type    AD Dalton, Ashley Claudene, PT Physical Therapist              Therapy Education  Given: HEP, Symptoms/condition management, Pain management, Posture/body mechanics  Program: Reinforced  How Provided: Verbal,  Demonstration  Provided to: Patient  Level of Understanding: Verbalized, Demonstrated              Time Calculation:   Start Time: 1030  Stop Time: 1124  Time Calculation (min): 54 min  Therapy Suggested Charges     Code   Minutes Charges    53495 (CPT®) Hc Pt Neuromusc Re Education Ea 15 Min      53575 (CPT®) Hc Pt Ther Proc Ea 15 Min 15 1    18543 (CPT®) Hc Gait Training Ea 15 Min      38566 (CPT®) Hc Pt Therapeutic Act Ea 15 Min      17104 (CPT®) Hc Pt Manual Therapy Ea 15 Min 18 1    16925 (CPT®) Hc Pt Ther Massage- Per 15 Min      94490 (CPT®) Hc Pt Iontophoresis Ea 15 Min      54606 (CPT®) Hc Pt Elec Stim Ea-Per 15 Min      45840 (CPT®) Hc Pt Ultrasound Ea 15 Min 8 1    46776 (CPT®) Hc Pt Self Care/Mgmt/Train Ea 15 Min      97434 (CPT®) Hc Pt Prosthetic (S) Train Initial Encounter, Each 15 Min      88362 (CPT®) Hc Orthotic(S) Mgmt/Train Initial Encounter, Each 15min      69387 (CPT®) Hc Pt Aquatic Therapy Ea 15 Min      72131 (CPT®) Hc Pt Orthotic(S)/Prosthetic(S) Encounter, Each 15 Min       (CPT®) Hc Pt Electrical Stim Unattended      Total  41 3        Therapy Charges for Today     Code Description Service Date Service Provider Modifiers Qty    31790263784 HC PT THER PROC EA 15 MIN 2/4/2019 Dalton, Ashley Claudene, PT GP 1    29637576775 HC PT MANUAL THERAPY EA 15 MIN 2/4/2019 Dalton, Ashley Claudene, PT GP 1    86069136789 HC PT ULTRASOUND EA 15 MIN 2/4/2019 Dalton, Ashley Claudene, PT GP 1                    Ashley Claudene Dalton, PT  2/4/2019

## 2019-02-06 ENCOUNTER — HOSPITAL ENCOUNTER (OUTPATIENT)
Dept: PHYSICAL THERAPY | Facility: HOSPITAL | Age: 39
Setting detail: THERAPIES SERIES
Discharge: HOME | End: 2019-02-06

## 2019-02-06 DIAGNOSIS — M50.20 CERVICAL DISC HERNIATION: Primary | ICD-10-CM

## 2019-02-06 PROCEDURE — 97010 HOT OR COLD PACKS THERAPY: CPT | Performed by: PHYSICAL THERAPIST

## 2019-02-06 PROCEDURE — G0283 ELEC STIM OTHER THAN WOUND: HCPCS | Performed by: PHYSICAL THERAPIST

## 2019-02-06 PROCEDURE — 97110 THERAPEUTIC EXERCISES: CPT | Performed by: PHYSICAL THERAPIST

## 2019-02-06 PROCEDURE — 97140 MANUAL THERAPY 1/> REGIONS: CPT | Performed by: PHYSICAL THERAPIST

## 2019-02-06 NOTE — THERAPY TREATMENT NOTE
"    Outpatient Physical Therapy Ortho Treatment Note   [facility]     Patient Name: Wenceslao Nava  : 1980  MRN: [id number]  Today's Date: 2019      Visit Date: 2019    Visit Dx:    ICD-10-CM ICD-9-CM   1. Cervical disc herniation M50.20 722.0       There is no problem list on file for this patient.       Past Medical History:   Diagnosis Date   • Hypertension    • Knee pain, left         Past Surgical History:   Procedure Laterality Date   • CHOLECYSTECTOMY         PT Ortho     Row Name 19 1000       Subjective Comments    Subjective Comments  Pt reported \"severe pain\" and contemplating \"going to the ER\".  -AD       Subjective Pain    Able to rate subjective pain?  yes  -AD    Pre-Treatment Pain Level  10  -AD    Post-Treatment Pain Level  8  -AD    Row Name 19 1100       Subjective Comments    Subjective Comments  Pt reported increased neck pain prior to today's treatment session.  -AD       Subjective Pain    Able to rate subjective pain?  yes  -AD    Pre-Treatment Pain Level  5  -AD    Post-Treatment Pain Level  8  -AD      User Key  (r) = Recorded By, (t) = Taken By, (c) = Cosigned By    Initials Name Provider Type    AD Dalton, Ashley Claudene, PT Physical Therapist                      PT Assessment/Plan     Row Name 19 1009          PT Assessment    Assessment Comments  The patient tolerated today's session well, with reports of decreased cervical pain upon conclusion. The session was initiated with moist heat combined with IFC to the cervical region, with no increased pain reported. Therapeutic exercises were progressed to include mid and low rows with RTB, UE/LE stationary bicycle, and red gripper introduced instead of yellow gripper. No increased pain was reported with exercises. The session concluded with STM followed by therapeutic ultrasound to the left upper trapezius. No skin irritation was observed following modalities. He will be progressed as tolerated.  -AD     " "   PT Plan    PT Plan Comments  Progress as tolerated per POC.  -AD       User Key  (r) = Recorded By, (t) = Taken By, (c) = Cosigned By    Initials Name Provider Type    AD Dalton, Ashley Claudene, PT Physical Therapist          Modalities     Row Name 02/06/19 1000             Moist Heat    MH Applied  Yes With IFC, no skin irritation observed.  -AD      Location  Bilateral upper trapezius  -AD      Rx Minutes  10 mins  -AD      MH Prior to Rx  Yes  -AD         Ultrasound 34118    Location  Bilateral UT region No skin irritation observed.  -AD      Duty Cycle  50  -AD      Frequency  -- 3.3MHz  -AD      Intensity - Wts/cm  1.2  -AD      56968 - PT Ultrasound Minutes  8  -AD         ELECTRICAL STIMULATION    Attended/Unattended  Unattended No irrittaion noted following estim  -AD      Stimulation Type  IFC  -AD      Max mAmp  -- Per pt tolerance  -AD      Location/Electrode Placement/Other  Cervical  -AD       PT E-Stim Unattended (Manual) Minutes  15  -AD        User Key  (r) = Recorded By, (t) = Taken By, (c) = Cosigned By    Initials Name Provider Type    AD Dalton, Ashley Claudene, PT Physical Therapist          Exercises     Row Name 02/06/19 1000 02/06/19 0900          Subjective Comments    Subjective Comments  Pt reported \"severe pain\" and contemplating \"going to the ER\".  -AD  --        Subjective Pain    Able to rate subjective pain?  yes  -AD  --     Pre-Treatment Pain Level  10  -AD  --     Post-Treatment Pain Level  8  -AD  --        Total Minutes    24106 - PT Therapeutic Exercise Minutes  25  -AD  --     75871 - PT Manual Therapy Minutes  --  10  -AD        Exercise 1    Exercise Name 1  UT stretch, levator scap stretch, CROM, scapular squeeze, red gripper, mid and low rows with RTB, upper and lower extremity stationary bicycle level 1.0 for 5 minutes.  -AD  --     Cueing 1  Verbal;Tactile;Demo  -AD  --     Time 1  25 minutes  -AD  --       User Key  (r) = Recorded By, (t) = Taken By, (c) = " Cosigned By    Initials Name Provider Type    AD Dalton, Ashley Claudene, PT Physical Therapist                        Manual Rx (last 36 hours)      Manual Treatments     Row Name 02/06/19 0900             Total Minutes    67320 - PT Manual Therapy Minutes  10  -AD         Manual Rx 1    Manual Rx 1 Location  Cervical, left UT  -AD      Manual Rx 1 Type  STM  -AD      Manual Rx 1 Grade  Per pt tolerance  -AD      Manual Rx 1 Duration  10 minutes  -AD        User Key  (r) = Recorded By, (t) = Taken By, (c) = Cosigned By    Initials Name Provider Type    AD Dalton, Ashley Claudene, PT Physical Therapist              Therapy Education  Given: HEP, Symptoms/condition management, Pain management, Posture/body mechanics  Program: Reinforced  How Provided: Verbal, Demonstration  Provided to: Patient  Level of Understanding: Verbalized, Demonstrated              Time Calculation:   Start Time: 0855  Stop Time: 1000  Time Calculation (min): 65 min  Therapy Suggested Charges     Code   Minutes Charges    42062 (CPT®) Hc Pt Neuromusc Re Education Ea 15 Min      27986 (CPT®) Hc Pt Ther Proc Ea 15 Min 25 2    47325 (CPT®) Hc Gait Training Ea 15 Min      25295 (CPT®) Hc Pt Therapeutic Act Ea 15 Min      98421 (CPT®) Hc Pt Manual Therapy Ea 15 Min 10 1    89202 (CPT®) Hc Pt Ther Massage- Per 15 Min      78231 (CPT®) Hc Pt Iontophoresis Ea 15 Min      73772 (CPT®) Hc Pt Elec Stim Ea-Per 15 Min      83464 (CPT®) Hc Pt Ultrasound Ea 15 Min 8     29565 (CPT®) Hc Pt Self Care/Mgmt/Train Ea 15 Min      94997 (CPT®) Hc Pt Prosthetic (S) Train Initial Encounter, Each 15 Min      42465 (CPT®) Hc Orthotic(S) Mgmt/Train Initial Encounter, Each 15min      53157 (CPT®) Hc Pt Aquatic Therapy Ea 15 Min      98874 (CPT®) Hc Pt Orthotic(S)/Prosthetic(S) Encounter, Each 15 Min       (CPT®) Hc Pt Electrical Stim Unattended 15 1    Total  58 4        Therapy Charges for Today     Code Description Service Date Service Provider Modifiers Qty     74542403763 HC PT THER PROC EA 15 MIN 2/6/2019 Dalton, Ashley Claudene, PT GP 2    77291405469 HC PT MANUAL THERAPY EA 15 MIN 2/6/2019 Dalton, Ashley Claudene, PT GP 1    30523189578 HC PT ELECTRICAL STIM UNATTENDED 2/6/2019 Dalton, Ashley Claudene, PT  1    09036566142 HC PT THER SUPP EA 15 MIN 2/6/2019 Dalton, Ashley Claudene, PT GP 1    74517977789 HC PT HOT/COLD PACK WC NONMCARE 2/6/2019 Dalton, Ashley Claudene, PT GP 1                    Ashley Claudene Dalton, PT  2/6/2019

## 2019-02-08 ENCOUNTER — HOSPITAL ENCOUNTER (OUTPATIENT)
Dept: PHYSICAL THERAPY | Facility: HOSPITAL | Age: 39
Setting detail: THERAPIES SERIES
Discharge: HOME | End: 2019-02-08

## 2019-02-08 DIAGNOSIS — M50.20 CERVICAL DISC HERNIATION: Primary | ICD-10-CM

## 2019-02-08 PROCEDURE — 97110 THERAPEUTIC EXERCISES: CPT

## 2019-02-08 PROCEDURE — 97035 APP MDLTY 1+ULTRASOUND EA 15: CPT

## 2019-02-08 PROCEDURE — 97140 MANUAL THERAPY 1/> REGIONS: CPT

## 2019-02-08 NOTE — THERAPY TREATMENT NOTE
"    Outpatient Physical Therapy Ortho Treatment Note   [facility]     Patient Name: Wenceslao Nava  : 1980  MRN: [id number]  Today's Date: 2019      Visit Date: 2019    Visit Dx:    ICD-10-CM ICD-9-CM   1. Cervical disc herniation M50.20 722.0       There is no problem list on file for this patient.       Past Medical History:   Diagnosis Date   • Hypertension    • Knee pain, left         Past Surgical History:   Procedure Laterality Date   • CHOLECYSTECTOMY         PT Ortho     Row Name 19 1000       Subjective Comments    Subjective Comments  Pt reported \"severe pain\" and contemplating \"going to the ER\".  -AD       Subjective Pain    Able to rate subjective pain?  yes  -AD    Pre-Treatment Pain Level  10  -AD    Post-Treatment Pain Level  8  -AD      User Key  (r) = Recorded By, (t) = Taken By, (c) = Cosigned By    Initials Name Provider Type    AD Dalton, Ashley Claudene, PT Physical Therapist                      PT Assessment/Plan     Row Name 19 1359          PT Assessment    Assessment Comments  Tx today conssited of mh and estim to bilat UTs followed by ther ex and ended with stm and US.  Pt responded well to tx with reports of decreased pain following session.  Pt required cues for most exercises.  -RT        PT Plan    PT Plan Comments  Will follow progressing mobility and decreased pain.  -RT       User Key  (r) = Recorded By, (t) = Taken By, (c) = Cosigned By    Initials Name Provider Type    RT Kris Weir, PT Physical Therapist          Modalities     Row Name 19 1300             Subjective Pain    Able to rate subjective pain?  yes  -RT      Pre-Treatment Pain Level  5  -RT      Post-Treatment Pain Level  3  -RT         Moist Heat    MH Applied  Yes  -RT      Location  Bilateral upper trapezius  -RT      Rx Minutes  10 mins  -RT      MH Prior to Rx  Yes  -RT         Ultrasound 98959    Location  Bilateral UT region  -RT      Duty Cycle  50  -RT      Frequency  " -- 3.3  -RT      Intensity - Wts/cm  1.2  -RT      61510 - PT Ultrasound Minutes  8  -RT         ELECTRICAL STIMULATION    Attended/Unattended  Unattended  -RT      Stimulation Type  IFC  -RT      Location/Electrode Placement/Other  Cervical  -RT       PT E-Stim Unattended (Manual) Minutes  10  -RT        User Key  (r) = Recorded By, (t) = Taken By, (c) = Cosigned By    Initials Name Provider Type    RT Kris Weir, PT Physical Therapist          Exercises     Row Name 02/08/19 1300             Subjective Comments    Subjective Comments  Pt rerports his left arm continues to get weaker.  -RT         Subjective Pain    Able to rate subjective pain?  yes  -RT      Pre-Treatment Pain Level  5  -RT      Post-Treatment Pain Level  3  -RT         Total Minutes    35659 - PT Therapeutic Exercise Minutes  30  -RT      75610 - PT Manual Therapy Minutes  10  -RT         Exercise 1    Exercise Name 1  ut stretch, lev scap stretch, crom 10, scap sq 20, red gripper 2min, mid and low rows 20, bike 5min  -RT      Cueing 1  Verbal;Tactile;Demo  -RT      Time 1  30 min  -RT        User Key  (r) = Recorded By, (t) = Taken By, (c) = Cosigned By    Initials Name Provider Type    RT Kris Weir, PT Physical Therapist                        Manual Rx (last 36 hours)      Manual Treatments     Row Name 02/08/19 1300             Total Minutes    62843 - PT Manual Therapy Minutes  10  -RT         Manual Rx 1    Manual Rx 1 Location  Cervical, left UT  -RT      Manual Rx 1 Type  STM  -RT      Manual Rx 1 Grade  Per pt tolerance  -RT      Manual Rx 1 Duration  10 minutes  -RT        User Key  (r) = Recorded By, (t) = Taken By, (c) = Cosigned By    Initials Name Provider Type    RT Kris Weir, PT Physical Therapist              Therapy Education  Given: HEP, Symptoms/condition management, Pain management, Posture/body mechanics  Program: Reinforced  How Provided: Verbal, Demonstration  Provided to: Patient  Level of  Understanding: Verbalized, Demonstrated              Time Calculation:   Start Time: 1235  Stop Time: 1340  Time Calculation (min): 65 min  Therapy Suggested Charges     Code   Minutes Charges    54807 (CPT®) Hc Pt Neuromusc Re Education Ea 15 Min      54961 (CPT®) Hc Pt Ther Proc Ea 15 Min 30 2    08912 (CPT®) Hc Gait Training Ea 15 Min      63034 (CPT®) Hc Pt Therapeutic Act Ea 15 Min      76527 (CPT®) Hc Pt Manual Therapy Ea 15 Min 10 1    30833 (CPT®) Hc Pt Ther Massage- Per 15 Min      87315 (CPT®) Hc Pt Iontophoresis Ea 15 Min      94072 (CPT®) Hc Pt Elec Stim Ea-Per 15 Min      96237 (CPT®) Hc Pt Ultrasound Ea 15 Min 8     00957 (CPT®) Hc Pt Self Care/Mgmt/Train Ea 15 Min      71098 (CPT®) Hc Pt Prosthetic (S) Train Initial Encounter, Each 15 Min      98431 (CPT®) Hc Orthotic(S) Mgmt/Train Initial Encounter, Each 15min      50545 (CPT®) Hc Pt Aquatic Therapy Ea 15 Min      89469 (CPT®) Hc Pt Orthotic(S)/Prosthetic(S) Encounter, Each 15 Min       (CPT®) Hc Pt Electrical Stim Unattended 10 1    Total  58 4        Therapy Charges for Today     Code Description Service Date Service Provider Modifiers Qty    28074854460 HC PT THER PROC EA 15 MIN 2/8/2019 Kris Weir, PT GP 2    28048871848 HC PT MANUAL THERAPY EA 15 MIN 2/8/2019 Kris Weir, PT GP 1    25162795484 HC PT ULTRASOUND EA 15 MIN 2/8/2019 Kris Weir, PT GP 1                    Kris Weir, PT  2/8/2019

## 2019-02-11 ENCOUNTER — HOSPITAL ENCOUNTER (OUTPATIENT)
Dept: PHYSICAL THERAPY | Facility: HOSPITAL | Age: 39
Setting detail: THERAPIES SERIES
Discharge: HOME | End: 2019-02-11

## 2019-02-11 DIAGNOSIS — M50.20 CERVICAL DISC HERNIATION: Primary | ICD-10-CM

## 2019-02-11 PROCEDURE — G0283 ELEC STIM OTHER THAN WOUND: HCPCS

## 2019-02-11 PROCEDURE — 97140 MANUAL THERAPY 1/> REGIONS: CPT

## 2019-02-11 PROCEDURE — 97110 THERAPEUTIC EXERCISES: CPT

## 2019-02-11 NOTE — THERAPY TREATMENT NOTE
Outpatient Physical Therapy Ortho Treatment Note   [facility]     Patient Name: Wenceslao Naav  : 1980  MRN: [id number]  Today's Date: 2019      Visit Date: 2019    Visit Dx:    ICD-10-CM ICD-9-CM   1. Cervical disc herniation M50.20 722.0       There is no problem list on file for this patient.       Past Medical History:   Diagnosis Date   • Hypertension    • Knee pain, left         Past Surgical History:   Procedure Laterality Date   • CHOLECYSTECTOMY         PT Ortho     Row Name 19 0800       Subjective Comments    Subjective Comments  Patient arrives to therapy w/ continued reports of neck and L) arm pain.  Pt states he is scheduled to have injections in March.   -JULIA       Subjective Pain    Able to rate subjective pain?  yes  -JULIA    Pre-Treatment Pain Level  5  -JULIA    Post-Treatment Pain Level  3  -JULIA      User Key  (r) = Recorded By, (t) = Taken By, (c) = Cosigned By    Initials Name Provider Type    Jessika Chi PTA Physical Therapy Assistant                      PT Assessment/Plan     Row Name 19 0907          PT Assessment    Assessment Comments  Patient completed today's session w/ reports of slight decrease in pain following, 3/10.  Pt received MH and Estim to B) UT region for pain control f/b therex as listed.  Treatment concluded with manual STM and continuous US.  Pt noted w/ increased muscular tightness in L) UT region.  Pt provided with cues throughout session for improved mechanics and for max benefit w/ activities.  Pt continues to progress as tolerated.  No adverse reactions observed following modalities.   -JULIA        PT Plan    PT Plan Comments  Continue with PT's POC and progress tx as tolerated by patient.   -JULIA       User Key  (r) = Recorded By, (t) = Taken By, (c) = Cosigned By    Initials Name Provider Type    Jessika Chi PTA Physical Therapy Assistant          Modalities     Row Name 19 0800             Moist Heat    MH  "Applied  Yes no redness observed following MH  -JULIA      Location  Bilateral upper trapezius  -JULIA      Rx Minutes  15 mins  -JULIA      MH Prior to Rx  Yes w/ estim in seated position  -JULIA         Ultrasound 91369    Location  Bilateral UT region  -JULIA      Duty Cycle  100  -JULIA      Frequency  -- 3.3MHz  -JULIA      Intensity - Wts/cm  1.2  -JULIA      03852 - PT Ultrasound Minutes  8  -JULIA         ELECTRICAL STIMULATION    Attended/Unattended  Unattended no skin irritation observed following  -JULIA      Stimulation Type  IFC  -JULIA      Max mAmp  -- as to pt's tolerance  -JULIA      Location/Electrode Placement/Other  Cervical  -JULIA       PT E-Stim Unattended (Manual) Minutes  15  -JULIA        User Key  (r) = Recorded By, (t) = Taken By, (c) = Cosigned By    Initials Name Provider Type    Jessika Chi PTA Physical Therapy Assistant          Exercises     Row Name 02/11/19 0900 02/11/19 0800          Subjective Comments    Subjective Comments  --  Patient arrives to therapy w/ continued reports of neck and L) arm pain.  Pt states he is scheduled to have injections in March.   -JULIA        Subjective Pain    Able to rate subjective pain?  --  yes  -JULIA     Pre-Treatment Pain Level  --  5  -JULIA     Post-Treatment Pain Level  --  3  -JULIA        Total Minutes    35632 - PT Therapeutic Exercise Minutes  --  30  -JULIA     69613 - PT Manual Therapy Minutes  10  -JULIA  --        Exercise 1    Exercise Name 1  --  UT stretch 3x20\", lev scap stretch 3x20\", cervical retraction 10x2 (supine), cervical AROM x 10 (gentle), gripper (red) x2min, midrow w/ tband (red) 15x2, UE bike LV 1 x 5min  -JULIA     Cueing 1  --  Verbal;Tactile;Demo  -JULIA     Time 1  --  30 min  -JULIA       User Key  (r) = Recorded By, (t) = Taken By, (c) = Cosigned By    Initials Name Provider Type    Jessika Chi PTA Physical Therapy Assistant                        Manual Rx (last 36 hours)      Manual Treatments     Row Name 02/11/19 0900             Total " Minutes    23458 - PT Manual Therapy Minutes  10  -JULIA         Manual Rx 1    Manual Rx 1 Location  cervical, B) UT region  -JULIA      Manual Rx 1 Type  STM  -JULIA      Manual Rx 1 Grade  as to pt's tolerance  -JULIA      Manual Rx 1 Duration  10 min  -JULIA        User Key  (r) = Recorded By, (t) = Taken By, (c) = Cosigned By    Initials Name Provider Type    Jessika Chi PTA Physical Therapy Assistant              Therapy Education  Given: HEP, Symptoms/condition management, Pain management, Posture/body mechanics  Program: Reinforced  How Provided: Verbal, Demonstration  Provided to: Patient  Level of Understanding: Verbalized, Demonstrated              Time Calculation:   Start Time: 0750  Stop Time: 0901  Time Calculation (min): 71 min  Therapy Suggested Charges     Code   Minutes Charges    72103 (CPT®) Hc Pt Neuromusc Re Education Ea 15 Min      30516 (CPT®) Hc Pt Ther Proc Ea 15 Min 30 2    64909 (CPT®) Hc Gait Training Ea 15 Min      59805 (CPT®) Hc Pt Therapeutic Act Ea 15 Min      39402 (CPT®) Hc Pt Manual Therapy Ea 15 Min 10 1    49612 (CPT®) Hc Pt Ther Massage- Per 15 Min      01181 (CPT®) Hc Pt Iontophoresis Ea 15 Min      21604 (CPT®) Hc Pt Elec Stim Ea-Per 15 Min      03911 (CPT®) Hc Pt Ultrasound Ea 15 Min 8     32230 (CPT®) Hc Pt Self Care/Mgmt/Train Ea 15 Min      21896 (CPT®) Hc Pt Prosthetic (S) Train Initial Encounter, Each 15 Min      67767 (CPT®) Hc Orthotic(S) Mgmt/Train Initial Encounter, Each 15min      51919 (CPT®) Hc Pt Aquatic Therapy Ea 15 Min      57539 (CPT®) Hc Pt Orthotic(S)/Prosthetic(S) Encounter, Each 15 Min       (CPT®) Hc Pt Electrical Stim Unattended 15 1    Total  63 4        Therapy Charges for Today     Code Description Service Date Service Provider Modifiers Qty    38214805377 HC PT THER PROC EA 15 MIN 2/11/2019 Jessika Cotto PTA GP 2    83060687000 HC PT MANUAL THERAPY EA 15 MIN 2/11/2019 Jessika Cotto PTA GP 1    76332411443 HC PT ELECTRICAL  STIM UNATTENDED 2/11/2019 Jessika Cotto, PTA  1                    Jessika Allen. Kirti, BRAULIO  2/11/2019

## 2019-02-13 ENCOUNTER — HOSPITAL ENCOUNTER (OUTPATIENT)
Dept: PHYSICAL THERAPY | Facility: HOSPITAL | Age: 39
Setting detail: THERAPIES SERIES
Discharge: HOME | End: 2019-02-13

## 2019-02-13 DIAGNOSIS — M50.20 CERVICAL DISC HERNIATION: Primary | ICD-10-CM

## 2019-02-13 PROCEDURE — G0283 ELEC STIM OTHER THAN WOUND: HCPCS | Performed by: PHYSICAL THERAPIST

## 2019-02-13 PROCEDURE — 97110 THERAPEUTIC EXERCISES: CPT | Performed by: PHYSICAL THERAPIST

## 2019-02-13 PROCEDURE — 97140 MANUAL THERAPY 1/> REGIONS: CPT | Performed by: PHYSICAL THERAPIST

## 2019-02-13 NOTE — THERAPY RE-EVALUATION
Outpatient Physical Therapy Ortho Re-Evaluation   [facility]     Patient Name: Wenceslao Nava  : 1980  MRN: [id number]  Today's Date: 2019      Visit Date: 2019    There is no problem list on file for this patient.       Past Medical History:   Diagnosis Date   • Hypertension    • Knee pain, left         Past Surgical History:   Procedure Laterality Date   • CHOLECYSTECTOMY         Visit Dx:     ICD-10-CM ICD-9-CM   1. Cervical disc herniation M50.20 722.0           PT Ortho     Row Name 19 0900       Subjective Comments    Subjective Comments  Pt reports 5/10 neck and left upper extremity pain prior to today's session. He reported he continues to lose mobility and strength in his left upper extremity.  -AD       Subjective Pain    Able to rate subjective pain?  yes  -AD    Pre-Treatment Pain Level  5  -AD    Post-Treatment Pain Level  3  -AD       Myotomal Screen- Upper Quarter Clearing    Shoulder flexion (C5)  Right:;4+ (Good +);Left:;2+ (Poor +)  -AD    Elbow flexion/wrist extension (C6)  Right:;4+ (Good +);Left:;2+ (Poor +)  -AD    Elbow extension/wrist flexion (C7)  Right:;4+ (Good +);Left:;2+ (Poor +)  -AD       Cervical/Shoulder ROM Screen    Cervical flexion  Impaired 20  -AD    Cervical extension  Impaired 30  -AD    Cervical lateral flexion  Impaired Right: 40, Left: 20  -AD    Cervical rotation  Impaired Right: 40, Left: 30  -AD    Row Name 19 0800       Subjective Comments    Subjective Comments  Patient arrives to therapy w/ continued reports of neck and L) arm pain.  Pt states he is scheduled to have injections in March.   -JULIA       Subjective Pain    Able to rate subjective pain?  yes  -JULIA    Pre-Treatment Pain Level  5  -JULIA    Post-Treatment Pain Level  3  -JULIA      User Key  (r) = Recorded By, (t) = Taken By, (c) = Cosigned By    Initials Name Provider Type    AD Dalton, Ashley Claudene, PT Physical Therapist    Jessika Chi, PTA Physical Therapy  Assistant                      Therapy Education  Given: HEP, Symptoms/condition management, Pain management, Posture/body mechanics  Program: Reinforced  How Provided: Verbal, Demonstration  Provided to: Patient  Level of Understanding: Verbalized, Demonstrated     PT OP Goals     Row Name 02/13/19 0900          PT Short Term Goals    STG 1  Pt will be instructed in HEP for improved independence.  -AD     STG 1 Progress  Ongoing  -AD     STG 2  Pt will report less than 60% impairment on the NDI for improved independence.  -AD     STG 2 Progress  Ongoing  -AD     STG 3  Pt will demonstrate a 5 degree improvement in cervical ROM.  -AD     STG 3 Progress  Not Met  -AD     STG 3 Progress Comments  No ROM changes.  -AD        Long Term Goals    LTG 1  Pt will report a maximum of 7/10 cervical pain.  -AD     LTG 1 Progress  Not Met  -AD     LTG 1 Progress Comments  10/10  -AD     LTG 2  Pt will report less than 50% impairment on the NDI for improved independence.  -AD     LTG 2 Progress  Ongoing  -AD     LTG 3  Pt will demonstrate a 10 degree improvement in cervical ROM for improved independence.  -AD     LTG 3 Progress  Not Met  -AD     LTG 4  Pt will demonstrate a 20 pound improvement in left  strength for improved ability to hold and carry objects.  -AD     LTG 4 Progress  Not Met  -AD       User Key  (r) = Recorded By, (t) = Taken By, (c) = Cosigned By    Initials Name Provider Type    AD Dalton, Ashley Claudene, PT Physical Therapist          PT Assessment/Plan     Row Name 02/13/19 0920          PT Assessment    Functional Limitations  Decreased safety during functional activities;Limitation in home management;Limitations in community activities;Performance in work activities;Performance in self-care ADL;Performance in leisure activities  -AD     Impairments  Endurance;Range of motion;Posture;Poor body mechanics;Pain;Muscle strength;Sensation;Joint integrity;Joint mobility  -AD     Assessment Comments   Measurements were reviewed during today's session, with patient demonstrated increased weakness of the left upper extremity. He demonstrated no change in cervical or left upper extremity ROM. Today's session was initiated with moist heat combined with IFC to the left upper trapezius followed by therapeutic exercises. Tactile and verbal cues were required for proper form. Exercises addressed scapular stability, cervical and left UE range of motion, and strength. The session concluded with STM to the left upper extremity addressing muscle guarding and trigger points, followed by therapeutic exercises. No skin irritation was observed following modalities. He would benefit from continued skilled physical therapy to address ongoing functional limitations. He will be progressed as tolerated.  -AD     Please refer to paper survey for additional self-reported information  Yes  -AD     Rehab Potential  Fair  -AD     Patient would benefit from skilled therapy intervention  Yes  -AD        PT Plan    PT Frequency  3x/week;4x/week  -AD     Predicted Duration of Therapy Intervention (Therapy Eval)  4 weeks  -AD     Planned CPT's?  PT EVAL HIGH COMPLEXITY: 74583;PT THER ACT EA 15 MIN: 24096;PT THER PROC EA 15 MIN: 38251;PT RE-EVAL: 20113;PT MANUAL THERAPY EA 15 MIN: 09293;PT NEUROMUSC RE-EDUCATION EA 15 MIN: 85845;PT SELF CARE/HOME MGMT/TRAIN EA 15: 68708;PT ELECTRICAL STIM UNATTEND: ;PT ELECTRICAL STIM ATTD EA 15 MIN: 13417;PT TRACTION CERVICAL: 58285;PT ULTRASOUND EA 15 MIN: 71810;PT HOT/COLD PACK WC NONMCARE: 22588;PT IONTOPHORESIS EA 15 MIN: 46610;PT THER SUPP EA 15 MIN  -AD     PT Plan Comments  Progress as tolerated per POC.  -AD       User Key  (r) = Recorded By, (t) = Taken By, (c) = Cosigned By    Initials Name Provider Type    AD Dalton, Ashley Claudene, PT Physical Therapist          Modalities     Row Name 02/13/19 0900             Moist Heat    MH Applied  Yes With IFC, no skin irritation observed.  -AD       Location  Bilateral upper trapezius  -AD      Rx Minutes  15 mins  -AD      MH Prior to Rx  Yes  -AD         Ultrasound 47475    Location  Left UT region No skin irritation observed.  -AD      Duty Cycle  100  -AD      Frequency  -- 3.3MHz  -AD      Intensity - Wts/cm  1.2  -AD      15979 - PT Ultrasound Minutes  8  -AD         ELECTRICAL STIMULATION    Attended/Unattended  Unattended With MH, no skin irritation observed.  -AD      Stimulation Type  IFC  -AD      Max mAmp  -- as to pt's tolerance  -AD      Location/Electrode Placement/Other  Cervical  -AD       PT E-Stim Unattended (Manual) Minutes  15  -AD        User Key  (r) = Recorded By, (t) = Taken By, (c) = Cosigned By    Initials Name Provider Type    AD Dalton, Ashley Claudene, PT Physical Therapist        Exercises     Row Name 02/13/19 0900             Subjective Comments    Subjective Comments  Pt reports 5/10 neck and left upper extremity pain prior to today's session. He reported he continues to lose mobility and strength in his left upper extremity.  -AD         Subjective Pain    Able to rate subjective pain?  yes  -AD      Pre-Treatment Pain Level  5  -AD      Post-Treatment Pain Level  3  -AD         Total Minutes    88573 - PT Therapeutic Exercise Minutes  30  -AD      54491 - PT Manual Therapy Minutes  10  -AD         Exercise 1    Exercise Name 1  UT stretch, levator scap stretch, scapular squeeze, cervical AROM, red gripper, RTB mid and low rows, stationary bicycle level 1.0 for 5 minutes.  -AD      Cueing 1  Verbal;Tactile;Demo  -AD      Time 1  30 minutes  -AD        User Key  (r) = Recorded By, (t) = Taken By, (c) = Cosigned By    Initials Name Provider Type    AD Dalton, Ashley Claudene, PT Physical Therapist           Manual Rx (last 36 hours)      Manual Treatments     Row Name 02/13/19 0900             Total Minutes    36660 - PT Manual Therapy Minutes  10  -AD         Manual Rx 1    Manual Rx 1 Location  Cervical, left UT region   -AD      Manual Rx 1 Type  STM  -AD      Manual Rx 1 Grade  as to pt's tolerance  -AD      Manual Rx 1 Duration  10 min  -AD        User Key  (r) = Recorded By, (t) = Taken By, (c) = Cosigned By    Initials Name Provider Type    AD Dalton, Ashley Claudene, PT Physical Therapist                                Time Calculation:     Therapy Suggested Charges     Code   Minutes Charges    68909 (CPT®) Hc Pt Neuromusc Re Education Ea 15 Min      65296 (CPT®) Hc Pt Ther Proc Ea 15 Min 30 2    75457 (CPT®) Hc Gait Training Ea 15 Min      42018 (CPT®) Hc Pt Therapeutic Act Ea 15 Min      38558 (CPT®) Hc Pt Manual Therapy Ea 15 Min 10 1    47439 (CPT®) Hc Pt Ther Massage- Per 15 Min      88138 (CPT®) Hc Pt Iontophoresis Ea 15 Min      27410 (CPT®) Hc Pt Elec Stim Ea-Per 15 Min      65286 (CPT®) Hc Pt Ultrasound Ea 15 Min 8     60716 (CPT®) Hc Pt Self Care/Mgmt/Train Ea 15 Min      96771 (CPT®) Hc Pt Prosthetic (S) Train Initial Encounter, Each 15 Min      74016 (CPT®) Hc Orthotic(S) Mgmt/Train Initial Encounter, Each 15min      81825 (CPT®) Hc Pt Aquatic Therapy Ea 15 Min      79061 (CPT®) Hc Pt Orthotic(S)/Prosthetic(S) Encounter, Each 15 Min       (CPT®) Hc Pt Electrical Stim Unattended 15 1    Total  63 4          Start Time: 0752  Stop Time: 0902  Time Calculation (min): 70 min     Therapy Charges for Today     Code Description Service Date Service Provider Modifiers Qty    93644666458 HC PT THER PROC EA 15 MIN 2/13/2019 Dalton, Ashley Claudene, PT GP 2    32378832625 HC PT MANUAL THERAPY EA 15 MIN 2/13/2019 Dalton, Ashley Claudene, PT GP 1    56500591395 HC PT ELECTRICAL STIM UNATTENDED 2/13/2019 Dalton, Ashley Claudene, PT  1                    Ashley Claudene Dalton, PT  2/13/2019

## 2019-02-13 NOTE — THERAPY TREATMENT NOTE
Outpatient Physical Therapy Ortho Treatment Note   [facility]     Patient Name: Wenceslao Nava  : 1980  MRN: [id number]  Today's Date: 2019      Visit Date: 2019    Visit Dx:    ICD-10-CM ICD-9-CM   1. Cervical disc herniation M50.20 722.0       There is no problem list on file for this patient.       Past Medical History:   Diagnosis Date   • Hypertension    • Knee pain, left         Past Surgical History:   Procedure Laterality Date   • CHOLECYSTECTOMY         PT Ortho     Row Name 19 0900       Subjective Comments    Subjective Comments  Pt reports 5/10 neck and left upper extremity pain prior to today's session. He reported he continues to lose mobility and strength in his left upper extremity.  -AD       Subjective Pain    Able to rate subjective pain?  yes  -AD    Pre-Treatment Pain Level  5  -AD    Post-Treatment Pain Level  3  -AD       Myotomal Screen- Upper Quarter Clearing    Shoulder flexion (C5)  Right:;4+ (Good +);Left:;2+ (Poor +)  -AD    Elbow flexion/wrist extension (C6)  Right:;4+ (Good +);Left:;2+ (Poor +)  -AD    Elbow extension/wrist flexion (C7)  Right:;4+ (Good +);Left:;2+ (Poor +)  -AD       Cervical/Shoulder ROM Screen    Cervical flexion  Impaired 20  -AD    Cervical extension  Impaired 30  -AD    Cervical lateral flexion  Impaired Right: 40, Left: 20  -AD    Cervical rotation  Impaired Right: 40, Left: 30  -AD    Row Name 19 0800       Subjective Comments    Subjective Comments  Patient arrives to therapy w/ continued reports of neck and L) arm pain.  Pt states he is scheduled to have injections in March.   -JULIA       Subjective Pain    Able to rate subjective pain?  yes  -JULIA    Pre-Treatment Pain Level  5  -JULIA    Post-Treatment Pain Level  3  -JULIA      User Key  (r) = Recorded By, (t) = Taken By, (c) = Cosigned By    Initials Name Provider Type    AD Dalton, Ashley Claudene, PT Physical Therapist    Jessika Chi, PTA Physical Therapy Assistant                       PT Assessment/Plan     Row Name 02/13/19 0920          PT Assessment    Functional Limitations  Decreased safety during functional activities;Limitation in home management;Limitations in community activities;Performance in work activities;Performance in self-care ADL;Performance in leisure activities  -AD     Impairments  Endurance;Range of motion;Posture;Poor body mechanics;Pain;Muscle strength;Sensation;Joint integrity;Joint mobility  -AD     Assessment Comments  Measurements were reviewed during today's session, with patient demonstrated increased weakness of the left upper extremity. He demonstrated no change in cervical or left upper extremity ROM. Today's session was initiated with moist heat combined with IFC to the left upper trapezius followed by therapeutic exercises. Tactile and verbal cues were required for proper form. Exercises addressed scapular stability, cervical and left UE range of motion, and strength. The session concluded with STM to the left upper extremity addressing muscle guarding and trigger points, followed by therapeutic exercises. No skin irritation was observed following modalities. He would benefit from continued skilled physical therapy to address ongoing functional limitations. He will be progressed as tolerated.  -AD     Please refer to paper survey for additional self-reported information  Yes  -AD     Rehab Potential  Fair  -AD     Patient would benefit from skilled therapy intervention  Yes  -AD        PT Plan    PT Frequency  3x/week;4x/week  -AD     Predicted Duration of Therapy Intervention (Therapy Eval)  4 weeks  -AD     Planned CPT's?  PT EVAL HIGH COMPLEXITY: 51458;PT THER ACT EA 15 MIN: 15276;PT THER PROC EA 15 MIN: 48979;PT RE-EVAL: 17012;PT MANUAL THERAPY EA 15 MIN: 15419;PT NEUROMUSC RE-EDUCATION EA 15 MIN: 19968;PT SELF CARE/HOME MGMT/TRAIN EA 15: 44407;PT ELECTRICAL STIM UNATTEND: ;PT ELECTRICAL STIM ATTD EA 15 MIN: 48193;PT TRACTION  CERVICAL: 24949;PT ULTRASOUND EA 15 MIN: 01467;PT HOT/COLD PACK WC NONMCARE: 16845;PT IONTOPHORESIS EA 15 MIN: 68155;PT THER SUPP EA 15 MIN  -AD     PT Plan Comments  Progress as tolerated per POC.  -AD       User Key  (r) = Recorded By, (t) = Taken By, (c) = Cosigned By    Initials Name Provider Type    AD Dalton, Ashley Claudene, PT Physical Therapist          Modalities     Row Name 02/13/19 0900             Moist Heat    MH Applied  Yes With IFC, no skin irritation observed.  -AD      Location  Bilateral upper trapezius  -AD      Rx Minutes  15 mins  -AD      MH Prior to Rx  Yes  -AD         Ultrasound 73701    Location  Left UT region No skin irritation observed.  -AD      Duty Cycle  100  -AD      Frequency  — 3.3MHz  -AD      Intensity - Wts/cm  1.2  -AD      63474 - PT Ultrasound Minutes  8  -AD         ELECTRICAL STIMULATION    Attended/Unattended  Unattended With MH, no skin irritation observed.  -AD      Stimulation Type  IFC  -AD      Max mAmp  — as to pt's tolerance  -AD      Location/Electrode Placement/Other  Cervical  -AD       PT E-Stim Unattended (Manual) Minutes  15  -AD        User Key  (r) = Recorded By, (t) = Taken By, (c) = Cosigned By    Initials Name Provider Type    AD Dalton, Ashley Claudene, PT Physical Therapist          Exercises     Row Name 02/13/19 0900             Subjective Comments    Subjective Comments  Pt reports 5/10 neck and left upper extremity pain prior to today's session. He reported he continues to lose mobility and strength in his left upper extremity.  -AD         Subjective Pain    Able to rate subjective pain?  yes  -AD      Pre-Treatment Pain Level  5  -AD      Post-Treatment Pain Level  3  -AD         Total Minutes    80719 - PT Therapeutic Exercise Minutes  30  -AD      66827 - PT Manual Therapy Minutes  10  -AD         Exercise 1    Exercise Name 1  UT stretch, levator scap stretch, scapular squeeze, cervical AROM, red gripper, RTB mid and low rows,  stationary bicycle level 1.0 for 5 minutes.  -AD      Cueing 1  Verbal;Tactile;Demo  -AD      Time 1  30 minutes  -AD        User Key  (r) = Recorded By, (t) = Taken By, (c) = Cosigned By    Initials Name Provider Type    AD Dalton, Ashley Claudene, PT Physical Therapist                        Manual Rx (last 36 hours)      Manual Treatments     Row Name 02/13/19 0900             Total Minutes    41609 - PT Manual Therapy Minutes  10  -AD         Manual Rx 1    Manual Rx 1 Location  Cervical, left UT region  -AD      Manual Rx 1 Type  STM  -AD      Manual Rx 1 Grade  as to pt's tolerance  -AD      Manual Rx 1 Duration  10 min  -AD        User Key  (r) = Recorded By, (t) = Taken By, (c) = Cosigned By    Initials Name Provider Type    AD Dalton, Ashley Claudene, PT Physical Therapist          PT OP Goals     Row Name 02/13/19 0900          PT Short Term Goals    STG 1  Pt will be instructed in HEP for improved independence.  -AD     STG 1 Progress  Ongoing  -AD     STG 2  Pt will report less than 60% impairment on the NDI for improved independence.  -AD     STG 2 Progress  Ongoing  -AD     STG 3  Pt will demonstrate a 5 degree improvement in cervical ROM.  -AD     STG 3 Progress  Not Met  -AD     STG 3 Progress Comments  No ROM changes.  -AD        Long Term Goals    LTG 1  Pt will report a maximum of 7/10 cervical pain.  -AD     LTG 1 Progress  Not Met  -AD     LTG 1 Progress Comments  10/10  -AD     LTG 2  Pt will report less than 50% impairment on the NDI for improved independence.  -AD     LTG 2 Progress  Ongoing  -AD     LTG 3  Pt will demonstrate a 10 degree improvement in cervical ROM for improved independence.  -AD     LTG 3 Progress  Not Met  -AD     LTG 4  Pt will demonstrate a 20 pound improvement in left  strength for improved ability to hold and carry objects.  -AD     LTG 4 Progress  Not Met  -AD       User Key  (r) = Recorded By, (t) = Taken By, (c) = Cosigned By    Initials Name Provider Type    AD  Dalton, Ashley Claudene, PT Physical Therapist          Therapy Education  Given: HEP, Symptoms/condition management, Pain management, Posture/body mechanics  Program: Reinforced  How Provided: Verbal, Demonstration  Provided to: Patient  Level of Understanding: Verbalized, Demonstrated              Time Calculation:   Start Time: 0752  Stop Time: 0902  Time Calculation (min): 70 min  Therapy Suggested Charges     Code   Minutes Charges    86045 (CPT®) Hc Pt Neuromusc Re Education Ea 15 Min      12203 (CPT®) Hc Pt Ther Proc Ea 15 Min 30 2    10772 (CPT®) Hc Gait Training Ea 15 Min      51837 (CPT®) Hc Pt Therapeutic Act Ea 15 Min      54375 (CPT®) Hc Pt Manual Therapy Ea 15 Min 10 1    66030 (CPT®) Hc Pt Ther Massage- Per 15 Min      54021 (CPT®) Hc Pt Iontophoresis Ea 15 Min      01117 (CPT®) Hc Pt Elec Stim Ea-Per 15 Min      38921 (CPT®) Hc Pt Ultrasound Ea 15 Min 8     40935 (CPT®) Hc Pt Self Care/Mgmt/Train Ea 15 Min      32189 (CPT®) Hc Pt Prosthetic (S) Train Initial Encounter, Each 15 Min      23636 (CPT®) Hc Orthotic(S) Mgmt/Train Initial Encounter, Each 15min      98142 (CPT®) Hc Pt Aquatic Therapy Ea 15 Min      99652 (CPT®) Hc Pt Orthotic(S)/Prosthetic(S) Encounter, Each 15 Min       (CPT®) Hc Pt Electrical Stim Unattended 15 1    Total  63 4        Therapy Charges for Today     Code Description Service Date Service Provider Modifiers Qty    70261244655 HC PT THER PROC EA 15 MIN 2/13/2019 Dalton, Ashley Claudene, PT GP 2    99932266410 HC PT MANUAL THERAPY EA 15 MIN 2/13/2019 Dalton, Ashley Claudene, PT GP 1    65870251296 HC PT ELECTRICAL STIM UNATTENDED 2/13/2019 Dalton, Ashley Claudene, PT  1                    Ashley Claudene Dalton, PT  2/13/2019

## 2019-02-20 ENCOUNTER — HOSPITAL ENCOUNTER (OUTPATIENT)
Dept: PHYSICAL THERAPY | Facility: HOSPITAL | Age: 39
Setting detail: THERAPIES SERIES
Discharge: HOME | End: 2019-02-20

## 2019-02-20 DIAGNOSIS — M50.20 CERVICAL DISC HERNIATION: Primary | ICD-10-CM

## 2019-02-20 PROCEDURE — 97140 MANUAL THERAPY 1/> REGIONS: CPT

## 2019-02-20 PROCEDURE — G0283 ELEC STIM OTHER THAN WOUND: HCPCS

## 2019-02-20 PROCEDURE — 97110 THERAPEUTIC EXERCISES: CPT

## 2019-02-20 NOTE — THERAPY TREATMENT NOTE
Outpatient Physical Therapy Ortho Treatment Note   [facility]     Patient Name: Wenceslao Nava  : 1980  MRN: [id number]  Today's Date: 2019      Visit Date: 2019    Visit Dx:    ICD-10-CM ICD-9-CM   1. Cervical disc herniation M50.20 722.0       There is no problem list on file for this patient.       Past Medical History:   Diagnosis Date   • Hypertension    • Knee pain, left         Past Surgical History:   Procedure Laterality Date   • CHOLECYSTECTOMY         PT Ortho     Row Name 19 0900       Subjective Comments    Subjective Comments  Patient states that he is having pain in his neck and left shoulder. Patient reports that he is unable to bend his elbow now. Patient states of neuro appt in May. -AC       Subjective Pain    Able to rate subjective pain?  yes  -AC    Pre-Treatment Pain Level  5  -AC    Post-Treatment Pain Level  3  -AC      User Key  (r) = Recorded By, (t) = Taken By, (c) = Cosigned By    Initials Name Provider Type    Lizette Starr PTA Physical Therapy Assistant                      PT Assessment/Plan     Row Name 19 1004          PT Assessment    Assessment Comments  Patient tolerated treatment session well with rest breaks taken as needed by the patient. Educated patient to perform ther-ex per his tolerance, patient verbalized understanding. Treatment session kept the same secondary to patient's reports of pain. STM performed to help decrease tightness and trigger points in the L) UT musculature. NO adverse reactions with modalities or treatment session.  -AC        PT Plan    PT Plan Comments  Continue per PT's POC, progress per the patient's tolerance.  -AC       User Key  (r) = Recorded By, (t) = Taken By, (c) = Cosigned By    Initials Name Provider Type    Lizette Starr PTA Physical Therapy Assistant          Modalities     Row Name 19 0900             Moist Heat    MH Applied  Yes No redness noted following moist heat  -AC       Location  Bilateral upper trapezius  -AC      Rx Minutes  15 mins  -AC      MH Prior to Rx  Yes  -AC         Ultrasound 81708    Location  Left UT region  -AC      Duty Cycle  100  -AC      Frequency  -- 3.3MHz  -AC      Intensity - Wts/cm  1.2  -AC      49612 - PT Ultrasound Minutes  8  -AC         ELECTRICAL STIMULATION    Attended/Unattended  Unattended No irritation noted following estim  -AC      Stimulation Type  IFC  -AC      Max mAmp  -- per the patient's tolerance, 15 minutes with MH  -AC      Location/Electrode Placement/Other  Cervical  -AC       PT E-Stim Unattended (Manual) Minutes  15  -AC        User Key  (r) = Recorded By, (t) = Taken By, (c) = Cosigned By    Initials Name Provider Type    AC Lizette Barros PTA Physical Therapy Assistant          Exercises     Row Name 02/20/19 0900             Subjective Comments    Subjective Comments  Patient states that he is having pain in his neck and left shoulder.  -AC         Subjective Pain    Able to rate subjective pain?  yes  -AC      Pre-Treatment Pain Level  5  -AC      Post-Treatment Pain Level  3  -AC         Total Minutes    10479 - PT Therapeutic Exercise Minutes  30  -AC      03672 - PT Manual Therapy Minutes  10  -AC         Exercise 1    Exercise Name 1  UT stretch 20 sec hold x3, levator scap stretch 20 sec hold x3, scap squeeze 15x2, CROM (flex, ext, rot) x15 each, gripper (red) 2min, mid row with RTB x15, low row with RTB x15, UE bike LV1.5 5 min  -AC      Cueing 1  Verbal;Tactile;Demo  -AC      Time 1  30 minutes  -AC        User Key  (r) = Recorded By, (t) = Taken By, (c) = Cosigned By    Initials Name Provider Type    AC Lizette Barros PTA Physical Therapy Assistant                        Manual Rx (last 36 hours)      Manual Treatments     Row Name 02/20/19 0900             Total Minutes    85134 - PT Manual Therapy Minutes  10  -AC         Manual Rx 1    Manual Rx 1 Location  Cervical, left UT region  -AC       Manual Rx 1 Type  STM  -AC      Manual Rx 1 Grade  as to pt's tolerance  -AC      Manual Rx 1 Duration  10 min  -AC        User Key  (r) = Recorded By, (t) = Taken By, (c) = Cosigned By    Initials Name Provider Type    Lizette Starr PTA Physical Therapy Assistant              Therapy Education  Given: HEP, Symptoms/condition management, Pain management, Posture/body mechanics  Program: Reinforced  How Provided: Verbal, Demonstration  Provided to: Patient  Level of Understanding: Verbalized, Demonstrated              Time Calculation:   Start Time: 0745  Stop Time: 0850  Time Calculation (min): 65 min  Therapy Suggested Charges     Code   Minutes Charges    81574 (CPT®) Hc Pt Neuromusc Re Education Ea 15 Min      57159 (CPT®) Hc Pt Ther Proc Ea 15 Min 30 2    07272 (CPT®) Hc Gait Training Ea 15 Min      56202 (CPT®) Hc Pt Therapeutic Act Ea 15 Min      03007 (CPT®) Hc Pt Manual Therapy Ea 15 Min 10 1    90450 (CPT®) Hc Pt Ther Massage- Per 15 Min      06965 (CPT®) Hc Pt Iontophoresis Ea 15 Min      39980 (CPT®) Hc Pt Elec Stim Ea-Per 15 Min      00401 (CPT®) Hc Pt Ultrasound Ea 15 Min 8     67334 (CPT®) Hc Pt Self Care/Mgmt/Train Ea 15 Min      23600 (CPT®) Hc Pt Prosthetic (S) Train Initial Encounter, Each 15 Min      12977 (CPT®) Hc Orthotic(S) Mgmt/Train Initial Encounter, Each 15min      87354 (CPT®) Hc Pt Aquatic Therapy Ea 15 Min      50832 (CPT®) Hc Pt Orthotic(S)/Prosthetic(S) Encounter, Each 15 Min       (CPT®) Hc Pt Electrical Stim Unattended 15 1    Total  63 4        Therapy Charges for Today     Code Description Service Date Service Provider Modifiers Qty    49667542386 HC PT THER PROC EA 15 MIN 2/20/2019 Lizette Barros, PTA GP 2    41706810041 HC PT MANUAL THERAPY EA 15 MIN 2/20/2019 Lizette Barros, PTA GP 1    76858229200 HC PT ELECTRICAL STIM UNATTENDED 2/20/2019 Lizette Barros, PTA  1                    Lizette Barros PTA  2/20/2019

## 2019-02-22 ENCOUNTER — HOSPITAL ENCOUNTER (OUTPATIENT)
Dept: PHYSICAL THERAPY | Facility: HOSPITAL | Age: 39
Setting detail: THERAPIES SERIES
Discharge: HOME | End: 2019-02-22

## 2019-02-22 DIAGNOSIS — M50.20 CERVICAL DISC HERNIATION: Primary | ICD-10-CM

## 2019-02-22 PROCEDURE — 97110 THERAPEUTIC EXERCISES: CPT

## 2019-02-22 PROCEDURE — G0283 ELEC STIM OTHER THAN WOUND: HCPCS

## 2019-02-22 PROCEDURE — 97140 MANUAL THERAPY 1/> REGIONS: CPT

## 2019-02-22 NOTE — THERAPY TREATMENT NOTE
Outpatient Physical Therapy Ortho Treatment Note   [facility]     Patient Name: Wenceslao Nava  : 1980  MRN: [id number]  Today's Date: 2019      Visit Date: 2019    Visit Dx:    ICD-10-CM ICD-9-CM   1. Cervical disc herniation M50.20 722.0       There is no problem list on file for this patient.       Past Medical History:   Diagnosis Date   • Hypertension    • Knee pain, left         Past Surgical History:   Procedure Laterality Date   • CHOLECYSTECTOMY         PT Ortho     Row Name 19 0800       Subjective Comments    Subjective Comments  Patient reports he contacted his neurologist on Wednesday regarding continued pain and symptoms.  Pt states the neurologist wants him to receive the injections in his neck before the next follow up.  Pt reports /10 pain prior to today's session.    -JULIA       Subjective Pain    Able to rate subjective pain?  yes  -JULIA    Pre-Treatment Pain Level  5  -JULIA    Post-Treatment Pain Level  4  -JULIA    Row Name 19 0900       Subjective Comments    Subjective Comments  Patient states that he is having pain in his neck and left shoulder.  -AC       Subjective Pain    Able to rate subjective pain?  yes  -AC    Pre-Treatment Pain Level  5  -AC    Post-Treatment Pain Level  3  -AC      User Key  (r) = Recorded By, (t) = Taken By, (c) = Cosigned By    Initials Name Provider Type    Jessika Chi, BRAULIO Physical Therapy Assistant    Lizette Starr PTA Physical Therapy Assistant                      PT Assessment/Plan     Row Name 19 0908          PT Assessment    Assessment Comments  Patient completed today's session w/ reports of decreased pain following, 4/10. Pt received MH and Estim to cervical region for pain control f/b therex as listed w/ focus on improved cervical ROM, cervical stability, and decreased radicular symptoms.  Treatment concluded with manual STM and continuous US to address muscular tightness.  Pt noted w/ increased  muscular tightness in B) UT w/ R>L.  Pt continues to progress as tolerated to addres goals, and achieve maximum level of function.  No adverse reactions observed following modalities.  -JULIA        PT Plan    PT Plan Comments  Continue with PT's POC and progress tx as tolerated by patient.   -JULIA       User Key  (r) = Recorded By, (t) = Taken By, (c) = Cosigned By    Initials Name Provider Type    Jessika Chi PTA Physical Therapy Assistant          Modalities     Row Name 02/22/19 0800             Moist Heat    MH Applied  Yes no redness observed following MH  -JULIA      Location  Bilateral upper trapezius  -JULIA      Rx Minutes  15 mins  -JULIA      MH Prior to Rx  Yes w/ estim in seated position  -JULIA         Ultrasound 59776    Location  Left UT region  -JULIA      Duty Cycle  100  -JULIA      Frequency  -- 3.3MHz  -JULIA      Intensity - Wts/cm  1.2  -JULIA      49237 - PT Ultrasound Minutes  8  -JULIA         ELECTRICAL STIMULATION    Attended/Unattended  Unattended no skin irritation observed following  -JULIA      Stimulation Type  IFC  -JULIA      Max mAmp  -- as to pt's tolerance  -JULIA      Location/Electrode Placement/Other  Cervical  -JULIA       PT E-Stim Unattended (Manual) Minutes  15  -JULIA        User Key  (r) = Recorded By, (t) = Taken By, (c) = Cosigned By    Initials Name Provider Type    Jessika Chi PTA Physical Therapy Assistant          Exercises     Row Name 02/22/19 0800 02/22/19 0700          Subjective Comments    Subjective Comments  Patient reports he contacted his neurologist on Wednesday regarding continued pain and symptoms.  Pt states the neurologist wants him to receive the injections in his neck before the next follow up.  Pt reports 5/10 pain prior to today's session.    -JULIA  --        Subjective Pain    Able to rate subjective pain?  yes  -JULIA  --     Pre-Treatment Pain Level  5  -JULIA  --     Post-Treatment Pain Level  4  -JULIA  --        Total Minutes    88312 - PT Therapeutic Exercise  Minutes  30  -JULIA  --     98311 - PT Manual Therapy Minutes  --  10  -JULIA        Exercise 1    Exercise Name 1  UT stretch 20 sec hold x3, levator scap stretch 20 sec hold x3, scap squeeze 15x2, CROM (ext, rot) x15 each, mid row with RTB 10x2, low row with YTB x10, UE bike LV1.5 5 min, supine cervical retraction 10x2  -JULIA  --     Cueing 1  Verbal;Tactile;Demo  -JULIA  --     Time 1  30 min  -JULIA  --       User Key  (r) = Recorded By, (t) = Taken By, (c) = Cosigned By    Initials Name Provider Type    Jessika Chi, BRAULIO Physical Therapy Assistant                        Manual Rx (last 36 hours)      Manual Treatments     Row Name 02/22/19 0700             Total Minutes    66035 - PT Manual Therapy Minutes  10  -JULIA         Manual Rx 1    Manual Rx 1 Location  cervical, B) UT region  -JULIA      Manual Rx 1 Type  STM  -JULIA      Manual Rx 1 Grade  as to pt's tolerance  -JULIA      Manual Rx 1 Duration  10 min  -JULIA        User Key  (r) = Recorded By, (t) = Taken By, (c) = Cosigned By    Initials Name Provider Type    Jessika Chi, BRAULIO Physical Therapy Assistant              Therapy Education  Given: HEP, Symptoms/condition management, Pain management, Posture/body mechanics  Program: Reinforced  How Provided: Verbal, Demonstration  Provided to: Patient  Level of Understanding: Verbalized, Demonstrated              Time Calculation:   Start Time: 0755  Stop Time: 0905  Time Calculation (min): 70 min  Therapy Suggested Charges     Code   Minutes Charges    31564 (CPT®) Hc Pt Neuromusc Re Education Ea 15 Min      72049 (CPT®) Hc Pt Ther Proc Ea 15 Min 30 2    54793 (CPT®) Hc Gait Training Ea 15 Min      22927 (CPT®) Hc Pt Therapeutic Act Ea 15 Min      32143 (CPT®) Hc Pt Manual Therapy Ea 15 Min 10 1    46203 (CPT®) Hc Pt Ther Massage- Per 15 Min      45682 (CPT®) Hc Pt Iontophoresis Ea 15 Min      51546 (CPT®) Hc Pt Elec Stim Ea-Per 15 Min      06562 (CPT®) Hc Pt Ultrasound Ea 15 Min 8     57026 (CPT®) Hc Pt  Self Care/Mgmt/Train Ea 15 Min      99780 (CPT®) Hc Pt Prosthetic (S) Train Initial Encounter, Each 15 Min      65450 (CPT®) Hc Orthotic(S) Mgmt/Train Initial Encounter, Each 15min      07725 (CPT®) Hc Pt Aquatic Therapy Ea 15 Min      90174 (CPT®) Hc Pt Orthotic(S)/Prosthetic(S) Encounter, Each 15 Min       (CPT®) Hc Pt Electrical Stim Unattended 15 1    Total  63 4        Therapy Charges for Today     Code Description Service Date Service Provider Modifiers Qty    85855177337 HC PT THER PROC EA 15 MIN 2/22/2019 Jessika Cotto, PTA GP 2    08540627059 HC PT MANUAL THERAPY EA 15 MIN 2/22/2019 Jessika Cotto, PTA GP 1    47846086087 HC PT ELECTRICAL STIM UNATTENDED 2/22/2019 Jessika Cotto, PTA  1                    Jessika Allen. BRAULIO Cotto  2/22/2019

## 2019-02-25 ENCOUNTER — HOSPITAL ENCOUNTER (OUTPATIENT)
Dept: PHYSICAL THERAPY | Facility: HOSPITAL | Age: 39
Setting detail: THERAPIES SERIES
Discharge: HOME | End: 2019-02-25

## 2019-02-25 DIAGNOSIS — M50.20 CERVICAL DISC HERNIATION: Primary | ICD-10-CM

## 2019-02-25 PROCEDURE — G0283 ELEC STIM OTHER THAN WOUND: HCPCS

## 2019-02-25 PROCEDURE — 97140 MANUAL THERAPY 1/> REGIONS: CPT

## 2019-02-25 PROCEDURE — 97110 THERAPEUTIC EXERCISES: CPT

## 2019-02-25 NOTE — THERAPY TREATMENT NOTE
Outpatient Physical Therapy Ortho Treatment Note   [facility]     Patient Name: Wenceslao Nava  : 1980  MRN: [id number]  Today's Date: 2019      Visit Date: 2019    Visit Dx:    ICD-10-CM ICD-9-CM   1. Cervical disc herniation M50.20 722.0       There is no problem list on file for this patient.       Past Medical History:   Diagnosis Date   • Hypertension    • Knee pain, left         Past Surgical History:   Procedure Laterality Date   • CHOLECYSTECTOMY                         PT Assessment/Plan     Row Name 19 9468          PT Assessment    Assessment Comments  Tx today consisted of mh and estim to neck followed by ther ex, manual and ended with US.  Pt tolerated tx fair today with reports of decreased pain following session today.  Pt did demonstrate cogwheeling with left elbow muscle testing and demonstrated a 15 lb left  strength.  -RT        PT Plan    PT Plan Comments  Will follow progressing as patient tolerates.  -RT       User Key  (r) = Recorded By, (t) = Taken By, (c) = Cosigned By    Initials Name Provider Type    RT Kris Weir, PT Physical Therapist          Modalities     Row Name 19 1500             Subjective Comments    Subjective Comments  Pt reports he has had increased left arm weakness and numbness.    -RT         Subjective Pain    Able to rate subjective pain?  yes  -RT      Pre-Treatment Pain Level  5  -RT      Post-Treatment Pain Level  4  -RT         Moist Heat    MH Applied  Yes  -RT      Location  Bilateral upper trapezius  -RT      Rx Minutes  15 mins  -RT      MH Prior to Rx  Yes  -RT         Ultrasound 45337    Location  Left UT region  -RT      Duty Cycle  100  -RT      Frequency  -- 3.3  -RT      Intensity - Wts/cm  1.2  -RT      18086 - PT Ultrasound Minutes  8  -RT         ELECTRICAL STIMULATION    Attended/Unattended  Unattended  -RT      Stimulation Type  IFC  -RT      Location/Electrode Placement/Other  Cervical  -RT       PT  E-Stim Unattended (Manual) Minutes  15  -RT        User Key  (r) = Recorded By, (t) = Taken By, (c) = Cosigned By    Initials Name Provider Type    RT Kris Weir, PT Physical Therapist          Exercises     Row Name 02/25/19 1500             Subjective Comments    Subjective Comments  Pt reports he has had increased left arm weakness and numbness.    -RT         Subjective Pain    Able to rate subjective pain?  yes  -RT      Pre-Treatment Pain Level  5  -RT      Post-Treatment Pain Level  4  -RT         Total Minutes    32026 - PT Therapeutic Exercise Minutes  30  -RT      10378 - PT Manual Therapy Minutes  10  -RT         Exercise 1    Exercise Name 1  UT stretch 20 sec hold x3, levator scap stretch 20 sec hold x3, scap squeeze 15x2, CROM (ext, rot) x15 each, mid row with RTB 25, low row with YTB x25, UE bike LV1.5 5 min, supine cervical retraction 10x2  -RT      Cueing 1  Verbal;Tactile;Demo  -RT      Time 1  30  -RT        User Key  (r) = Recorded By, (t) = Taken By, (c) = Cosigned By    Initials Name Provider Type    RT Kris Weir, PT Physical Therapist                        Manual Rx (last 36 hours)      Manual Treatments     Row Name 02/25/19 1500             Total Minutes    16616 - PT Manual Therapy Minutes  10  -RT         Manual Rx 1    Manual Rx 1 Location  cervical, B) UT region  -RT      Manual Rx 1 Type  STM  -RT      Manual Rx 1 Grade  as to pt's tolerance  -RT      Manual Rx 1 Duration  10 min  -RT        User Key  (r) = Recorded By, (t) = Taken By, (c) = Cosigned By    Initials Name Provider Type    RT Kris Weir, PT Physical Therapist              Therapy Education  Given: HEP, Symptoms/condition management, Pain management, Posture/body mechanics  Program: Reinforced  How Provided: Verbal, Demonstration  Provided to: Patient  Level of Understanding: Verbalized, Demonstrated              Time Calculation:   Start Time: 1348  Stop Time: 1450  Time Calculation (min): 62  min  Therapy Suggested Charges     Code   Minutes Charges    12031 (CPT®) Hc Pt Neuromusc Re Education Ea 15 Min      31554 (CPT®) Hc Pt Ther Proc Ea 15 Min 30 2    24220 (CPT®) Hc Gait Training Ea 15 Min      22166 (CPT®) Hc Pt Therapeutic Act Ea 15 Min      00345 (CPT®) Hc Pt Manual Therapy Ea 15 Min 10 1    14977 (CPT®) Hc Pt Ther Massage- Per 15 Min      72214 (CPT®) Hc Pt Iontophoresis Ea 15 Min      14705 (CPT®) Hc Pt Elec Stim Ea-Per 15 Min      79399 (CPT®) Hc Pt Ultrasound Ea 15 Min 8     83659 (CPT®) Hc Pt Self Care/Mgmt/Train Ea 15 Min      55087 (CPT®) Hc Pt Prosthetic (S) Train Initial Encounter, Each 15 Min      67497 (CPT®) Hc Orthotic(S) Mgmt/Train Initial Encounter, Each 15min      93082 (CPT®) Hc Pt Aquatic Therapy Ea 15 Min      70676 (CPT®) Hc Pt Orthotic(S)/Prosthetic(S) Encounter, Each 15 Min       (CPT®) Hc Pt Electrical Stim Unattended 15 1    Total  63 4        Therapy Charges for Today     Code Description Service Date Service Provider Modifiers Qty    45928264169 HC PT THER PROC EA 15 MIN 2/25/2019 Kris Weir, PT GP 2    23532366006 HC PT MANUAL THERAPY EA 15 MIN 2/25/2019 Kris Weir, PT GP 1    03683672962 HC PT ELECTRICAL STIM UNATTENDED 2/25/2019 Kris Weir, PT  1                    Kris Weir, PT  2/25/2019

## 2019-02-27 ENCOUNTER — HOSPITAL ENCOUNTER (OUTPATIENT)
Dept: PHYSICAL THERAPY | Facility: HOSPITAL | Age: 39
Setting detail: THERAPIES SERIES
Discharge: HOME | End: 2019-02-27

## 2019-02-27 DIAGNOSIS — M50.20 CERVICAL DISC HERNIATION: Primary | ICD-10-CM

## 2019-02-27 PROCEDURE — G0283 ELEC STIM OTHER THAN WOUND: HCPCS

## 2019-02-27 PROCEDURE — 97110 THERAPEUTIC EXERCISES: CPT

## 2019-02-27 PROCEDURE — 97035 APP MDLTY 1+ULTRASOUND EA 15: CPT

## 2019-02-27 NOTE — THERAPY TREATMENT NOTE
Outpatient Physical Therapy Ortho Treatment Note   [place]     Patient Name: Wenceslao Nava  : 1980  MRN: [id number]  Today's Date: 2019      Visit Date: 2019    Visit Dx:    ICD-10-CM ICD-9-CM   1. Cervical disc herniation M50.20 722.0       There is no problem list on file for this patient.       Past Medical History:   Diagnosis Date   • Hypertension    • Knee pain, left         Past Surgical History:   Procedure Laterality Date   • CHOLECYSTECTOMY         PT Ortho     Row Name 19 1600       Subjective Pain    Able to rate subjective pain?  yes  -JULIA    Pre-Treatment Pain Level  5  -JULIA    Post-Treatment Pain Level  4  -JULIA      User Key  (r) = Recorded By, (t) = Taken By, (c) = Cosigned By    Initials Name Provider Type    Jessika Chi PTA Physical Therapy Assistant                      PT Assessment/Plan     Row Name 19 1616          PT Assessment    Assessment Comments  Patient completed today's tx with reports of slight decrease in pain following, 4/10.  Session initiated w/ MH and Estim to cervical region for pain control f/b therex as listed.  Pt continues to require cues throughout session for improved mechanics and for max benefit w/ activities.  Treatment concluded with manual STM to bilateral UT /cervical region and continuous US.  Pt continues to progress as tolerated to address goals, and achieve maximum level of function.  No adverse reactions observed following modalities.   -JULIA        PT Plan    PT Plan Comments  Continue with PT's POC and progress tx as tolerated by patient.   -JULIA       User Key  (r) = Recorded By, (t) = Taken By, (c) = Cosigned By    Initials Name Provider Type    Jessika Chi PTA Physical Therapy Assistant          Modalities     Row Name 19 1600             Subjective Comments    Subjective Comments  Patient arrives to therapy w/ continued reports of neck and left arm pain.  Otherwise pt states of no new  changes.   -JULIA         Moist Heat    MH Applied  Yes no redness observed following MH  -JULIA      Location  Bilateral upper trapezius  -JULIA      Rx Minutes  15 mins  -JULIA      MH Prior to Rx  Yes w/ estim in seated position  -JULIA         Ultrasound 92599    Location  B) UT region  -JULIA      Duty Cycle  100  -JULIA      Frequency  -- 3.3MHz  -JULIA      Intensity - Wts/cm  1.2  -JULIA      36852 - PT Ultrasound Minutes  8  -JULIA         ELECTRICAL STIMULATION    Attended/Unattended  Unattended no skin irritation observed following estim  -JULIA      Stimulation Type  IFC  -JULIA      Location/Electrode Placement/Other  Cervical  -JULIA       PT E-Stim Unattended (Manual) Minutes  15  -JULIA        User Key  (r) = Recorded By, (t) = Taken By, (c) = Cosigned By    Initials Name Provider Type    Jessika Chi PTA Physical Therapy Assistant          Exercises     Row Name 02/27/19 1600 02/27/19 1500          Subjective Comments    Subjective Comments  Patient arrives to therapy w/ continued reports of neck and left arm pain.  Otherwise pt states of no new changes.   -JULIA  --        Subjective Pain    Able to rate subjective pain?  yes  -JULIA  --     Pre-Treatment Pain Level  5  -JULIA  --     Post-Treatment Pain Level  4  -JULIA  --        Total Minutes    65931 - PT Therapeutic Exercise Minutes  30  -JULIA  --     13180 - PT Manual Therapy Minutes  --  10  -JULIA        Exercise 1    Exercise Name 1  UT stretch 20 sec hold x3, levator scap stretch 20 sec hold x3, scap squeeze 15x2, CROM (ext, rot) 10x2 each, mid row with RTB x25, low row with RTB x25, UE bike LV1.0 5 min, supine cervical retraction 10x2  -JULIA  --     Cueing 1  Verbal;Tactile;Demo  -JULIA  --     Time 1  30 min  -JULIA  --       User Key  (r) = Recorded By, (t) = Taken By, (c) = Cosigned By    Initials Name Provider Type    Jessika Chi PTA Physical Therapy Assistant                        Manual Rx (last 36 hours)      Manual Treatments     Row Name 02/27/19 1500              Total Minutes    53309 - PT Manual Therapy Minutes  10  -JULIA         Manual Rx 1    Manual Rx 1 Location  cervical, B) UT region  -JULIA      Manual Rx 1 Type  STM  -JULIA      Manual Rx 1 Grade  as to pt's tolerance  -JULIA      Manual Rx 1 Duration  10 min  -JULIA        User Key  (r) = Recorded By, (t) = Taken By, (c) = Cosigned By    Initials Name Provider Type    Jessika Chi PTA Physical Therapy Assistant              Therapy Education  Given: HEP, Symptoms/condition management, Pain management, Posture/body mechanics  Program: Reinforced  How Provided: Verbal, Demonstration  Provided to: Patient  Level of Understanding: Verbalized, Demonstrated              Time Calculation:   Start Time: 1450  Stop Time: 1600  Time Calculation (min): 70 min  Therapy Suggested Charges     Code   Minutes Charges    54950 (CPT®) Hc Pt Neuromusc Re Education Ea 15 Min      20669 (CPT®) Hc Pt Ther Proc Ea 15 Min 30 2    25286 (CPT®) Hc Gait Training Ea 15 Min      23533 (CPT®) Hc Pt Therapeutic Act Ea 15 Min      13750 (CPT®) Hc Pt Manual Therapy Ea 15 Min 10 1    86661 (CPT®) Hc Pt Ther Massage- Per 15 Min      29682 (CPT®) Hc Pt Iontophoresis Ea 15 Min      90189 (CPT®) Hc Pt Elec Stim Ea-Per 15 Min      20315 (CPT®) Hc Pt Ultrasound Ea 15 Min 8     97545 (CPT®) Hc Pt Self Care/Mgmt/Train Ea 15 Min      81901 (CPT®) Hc Pt Prosthetic (S) Train Initial Encounter, Each 15 Min      38299 (CPT®) Hc Orthotic(S) Mgmt/Train Initial Encounter, Each 15min      29439 (CPT®) Hc Pt Aquatic Therapy Ea 15 Min      70184 (CPT®) Hc Pt Orthotic(S)/Prosthetic(S) Encounter, Each 15 Min       (CPT®) Hc Pt Electrical Stim Unattended 15 1    Total  63 4        Therapy Charges for Today     Code Description Service Date Service Provider Modifiers Qty    64624611120 HC PT THER PROC EA 15 MIN 2/27/2019 Jessika Cotto PTA GP 2    19222408365 HC PT ULTRASOUND EA 15 MIN 2/27/2019 Jessika Cotto PTA GP 1    31685550135 HC PT  ELECTRICAL STIM UNATTENDED 2/27/2019 Jessika Cotto, PTA  1                    Jessika Allen. Kirti, BRAULIO  2/27/2019

## 2019-02-28 ENCOUNTER — HOSPITAL ENCOUNTER (OUTPATIENT)
Dept: PHYSICAL THERAPY | Facility: HOSPITAL | Age: 39
Setting detail: THERAPIES SERIES
Discharge: HOME | End: 2019-02-28

## 2019-02-28 DIAGNOSIS — M50.20 CERVICAL DISC HERNIATION: Primary | ICD-10-CM

## 2019-02-28 PROCEDURE — 97110 THERAPEUTIC EXERCISES: CPT

## 2019-02-28 PROCEDURE — G0283 ELEC STIM OTHER THAN WOUND: HCPCS

## 2019-03-01 ENCOUNTER — APPOINTMENT (OUTPATIENT)
Dept: PHYSICAL THERAPY | Facility: HOSPITAL | Age: 39
End: 2019-03-01

## 2019-07-06 ENCOUNTER — HOSPITAL ENCOUNTER (EMERGENCY)
Facility: HOSPITAL | Age: 39
Discharge: HOME | End: 2019-07-06
Attending: FAMILY MEDICINE | Admitting: FAMILY MEDICINE

## 2019-07-06 VITALS
BODY MASS INDEX: 32.51 KG/M2 | OXYGEN SATURATION: 98 % | TEMPERATURE: 98 F | HEIGHT: 72 IN | RESPIRATION RATE: 16 BRPM | HEART RATE: 67 BPM | SYSTOLIC BLOOD PRESSURE: 116 MMHG | DIASTOLIC BLOOD PRESSURE: 66 MMHG | WEIGHT: 240 LBS

## 2019-07-06 DIAGNOSIS — M54.50 ACUTE EXACERBATION OF CHRONIC LOW BACK PAIN: Primary | ICD-10-CM

## 2019-07-06 DIAGNOSIS — G89.29 ACUTE EXACERBATION OF CHRONIC LOW BACK PAIN: Primary | ICD-10-CM

## 2019-07-06 PROCEDURE — 99283 EMERGENCY DEPT VISIT LOW MDM: CPT

## 2019-07-06 PROCEDURE — 96372 THER/PROPH/DIAG INJ SC/IM: CPT

## 2019-07-06 PROCEDURE — 25010000002 DEXAMETHASONE SODIUM PHOSPHATE 20 MG/5ML SOLUTION: Performed by: NURSE PRACTITIONER

## 2019-07-06 PROCEDURE — 25010000002 KETOROLAC TROMETHAMINE PER 15 MG: Performed by: NURSE PRACTITIONER

## 2019-07-06 RX ORDER — HYDROCODONE BITARTRATE AND ACETAMINOPHEN 7.5; 325 MG/1; MG/1
1 TABLET ORAL ONCE
Status: COMPLETED | OUTPATIENT
Start: 2019-07-06 | End: 2019-07-06

## 2019-07-06 RX ORDER — KETOROLAC TROMETHAMINE 30 MG/ML
60 INJECTION, SOLUTION INTRAMUSCULAR; INTRAVENOUS ONCE
Status: COMPLETED | OUTPATIENT
Start: 2019-07-06 | End: 2019-07-06

## 2019-07-06 RX ORDER — DEXAMETHASONE SODIUM PHOSPHATE 4 MG/ML
8 INJECTION, SOLUTION INTRA-ARTICULAR; INTRALESIONAL; INTRAMUSCULAR; INTRAVENOUS; SOFT TISSUE ONCE
Status: COMPLETED | OUTPATIENT
Start: 2019-07-06 | End: 2019-07-06

## 2019-07-06 RX ORDER — HYDROCODONE BITARTRATE AND ACETAMINOPHEN 5; 325 MG/1; MG/1
1 TABLET ORAL 4 TIMES DAILY PRN
Qty: 12 TABLET | Refills: 0 | OUTPATIENT
Start: 2019-07-06 | End: 2020-09-17

## 2019-07-06 RX ADMIN — KETOROLAC TROMETHAMINE 60 MG: 60 INJECTION, SOLUTION INTRAMUSCULAR at 16:38

## 2019-07-06 RX ADMIN — HYDROCODONE BITARTRATE AND ACETAMINOPHEN 1 TABLET: 7.5; 325 TABLET ORAL at 16:39

## 2019-07-06 RX ADMIN — DEXAMETHASONE SODIUM PHOSPHATE 8 MG: 4 INJECTION, SOLUTION INTRAMUSCULAR; INTRAVENOUS at 16:38

## 2019-07-06 NOTE — ED PROVIDER NOTES
Subjective     Back Pain   Location:  Lumbar spine  Quality:  Aching and shooting  Radiates to:  L posterior upper leg  Pain severity:  Moderate  Pain is:  Same all the time  Onset quality:  Gradual  Timing:  Constant  Progression:  Waxing and waning  Chronicity:  Chronic  Relieved by:  Nothing  Worsened by:  Movement and ambulation  Ineffective treatments:  OTC medications, muscle relaxants and heating pad  Associated symptoms: no abdominal pain, no bladder incontinence, no bowel incontinence, no chest pain, no dysuria and no fever        Review of Systems   Constitutional: Negative.  Negative for fever.   HENT: Negative.    Respiratory: Negative.    Cardiovascular: Negative.  Negative for chest pain.   Gastrointestinal: Negative.  Negative for abdominal pain and bowel incontinence.   Endocrine: Negative.    Genitourinary: Negative.  Negative for bladder incontinence and dysuria.   Musculoskeletal: Positive for back pain.   Skin: Negative.    Neurological: Negative.    Psychiatric/Behavioral: Negative.    All other systems reviewed and are negative.      Past Medical History:   Diagnosis Date   • Hypertension    • Knee pain, left        No Known Allergies    Past Surgical History:   Procedure Laterality Date   • CHOLECYSTECTOMY         Family History   Problem Relation Age of Onset   • Diabetes Brother    • Diabetes Mother        Social History     Socioeconomic History   • Marital status: Single     Spouse name: Not on file   • Number of children: Not on file   • Years of education: Not on file   • Highest education level: Not on file   Tobacco Use   • Smoking status: Never Smoker   • Smokeless tobacco: Current User   Substance and Sexual Activity   • Alcohol use: Yes     Comment: occasional    • Drug use: No   • Sexual activity: Defer           Objective   Physical Exam   Constitutional: He is oriented to person, place, and time. He appears well-developed and well-nourished. No distress.   HENT:   Head:  Normocephalic and atraumatic.   Right Ear: External ear normal.   Left Ear: External ear normal.   Nose: Nose normal.   Eyes: Conjunctivae and EOM are normal. Pupils are equal, round, and reactive to light.   Neck: Normal range of motion. Neck supple. No JVD present. No tracheal deviation present.   Cardiovascular: Normal rate, regular rhythm and normal heart sounds.   No murmur heard.  Pulmonary/Chest: Effort normal and breath sounds normal. No respiratory distress. He has no wheezes.   Abdominal: Soft. Bowel sounds are normal. There is no tenderness.   Musculoskeletal: He exhibits no edema or deformity.        Lumbar back: He exhibits decreased range of motion.   Neurological: He is alert and oriented to person, place, and time. No cranial nerve deficit.   Skin: Skin is warm and dry. No rash noted. He is not diaphoretic. No erythema. No pallor.   Psychiatric: He has a normal mood and affect. His behavior is normal. Thought content normal.   Nursing note and vitals reviewed.      Procedures           ED Course                  MDM  Number of Diagnoses or Management Options  Acute exacerbation of chronic low back pain: established and worsening        Final diagnoses:   Acute exacerbation of chronic low back pain            Rossi Cook, APRN  07/06/19 7140

## 2019-07-21 ENCOUNTER — HOSPITAL ENCOUNTER (EMERGENCY)
Facility: HOSPITAL | Age: 39
Discharge: HOME | End: 2019-07-21
Admitting: EMERGENCY MEDICINE

## 2019-07-21 VITALS
RESPIRATION RATE: 20 BRPM | HEIGHT: 72 IN | BODY MASS INDEX: 33.86 KG/M2 | OXYGEN SATURATION: 98 % | WEIGHT: 250 LBS | TEMPERATURE: 97.8 F | HEART RATE: 73 BPM | SYSTOLIC BLOOD PRESSURE: 144 MMHG | DIASTOLIC BLOOD PRESSURE: 76 MMHG

## 2019-07-21 DIAGNOSIS — M54.31 SCIATICA OF RIGHT SIDE: Primary | ICD-10-CM

## 2019-07-21 PROCEDURE — 25010000002 METHYLPREDNISOLONE PER 125 MG: Performed by: NURSE PRACTITIONER

## 2019-07-21 PROCEDURE — 96372 THER/PROPH/DIAG INJ SC/IM: CPT

## 2019-07-21 PROCEDURE — 99283 EMERGENCY DEPT VISIT LOW MDM: CPT

## 2019-07-21 PROCEDURE — 25010000002 KETOROLAC TROMETHAMINE PER 15 MG: Performed by: NURSE PRACTITIONER

## 2019-07-21 RX ORDER — METHYLPREDNISOLONE SODIUM SUCCINATE 125 MG/2ML
125 INJECTION, POWDER, LYOPHILIZED, FOR SOLUTION INTRAMUSCULAR; INTRAVENOUS ONCE
Status: COMPLETED | OUTPATIENT
Start: 2019-07-21 | End: 2019-07-21

## 2019-07-21 RX ORDER — KETOROLAC TROMETHAMINE 30 MG/ML
60 INJECTION, SOLUTION INTRAMUSCULAR; INTRAVENOUS ONCE
Status: COMPLETED | OUTPATIENT
Start: 2019-07-21 | End: 2019-07-21

## 2019-07-21 RX ADMIN — METHYLPREDNISOLONE SODIUM SUCCINATE 125 MG: 125 INJECTION, POWDER, FOR SOLUTION INTRAMUSCULAR; INTRAVENOUS at 20:58

## 2019-07-21 RX ADMIN — KETOROLAC TROMETHAMINE 60 MG: 60 INJECTION, SOLUTION INTRAMUSCULAR at 20:58

## 2019-07-25 ENCOUNTER — HOSPITAL ENCOUNTER (OUTPATIENT)
Dept: GENERAL RADIOLOGY | Facility: HOSPITAL | Age: 39
Discharge: HOME | End: 2019-07-25
Admitting: PHYSICIAN ASSISTANT

## 2019-07-25 ENCOUNTER — TRANSCRIBE ORDERS (OUTPATIENT)
Dept: ADMINISTRATIVE | Facility: HOSPITAL | Age: 39
End: 2019-07-25

## 2019-07-25 DIAGNOSIS — M54.5 LOW BACK PAIN, UNSPECIFIED BACK PAIN LATERALITY, UNSPECIFIED CHRONICITY, WITH SCIATICA PRESENCE UNSPECIFIED: Primary | ICD-10-CM

## 2019-07-25 DIAGNOSIS — M54.5 LOW BACK PAIN, UNSPECIFIED BACK PAIN LATERALITY, UNSPECIFIED CHRONICITY, WITH SCIATICA PRESENCE UNSPECIFIED: ICD-10-CM

## 2019-07-25 PROCEDURE — 72110 X-RAY EXAM L-2 SPINE 4/>VWS: CPT

## 2019-07-25 PROCEDURE — 72110 X-RAY EXAM L-2 SPINE 4/>VWS: CPT | Performed by: RADIOLOGY

## 2019-07-29 ENCOUNTER — TRANSCRIBE ORDERS (OUTPATIENT)
Dept: PHYSICAL THERAPY | Facility: HOSPITAL | Age: 39
End: 2019-07-29

## 2019-07-29 DIAGNOSIS — M54.5 LOW BACK PAIN, UNSPECIFIED BACK PAIN LATERALITY, UNSPECIFIED CHRONICITY, WITH SCIATICA PRESENCE UNSPECIFIED: Primary | ICD-10-CM

## 2019-08-01 ENCOUNTER — APPOINTMENT (OUTPATIENT)
Dept: PHYSICAL THERAPY | Facility: HOSPITAL | Age: 39
End: 2019-08-01

## 2020-09-17 ENCOUNTER — APPOINTMENT (OUTPATIENT)
Dept: GENERAL RADIOLOGY | Facility: HOSPITAL | Age: 40
End: 2020-09-17

## 2020-09-17 ENCOUNTER — APPOINTMENT (OUTPATIENT)
Dept: CT IMAGING | Facility: HOSPITAL | Age: 40
End: 2020-09-17

## 2020-09-17 ENCOUNTER — HOSPITAL ENCOUNTER (EMERGENCY)
Facility: HOSPITAL | Age: 40
Discharge: HOME | End: 2020-09-17
Attending: EMERGENCY MEDICINE | Admitting: EMERGENCY MEDICINE

## 2020-09-17 VITALS
TEMPERATURE: 97.5 F | RESPIRATION RATE: 20 BRPM | BODY MASS INDEX: 33.86 KG/M2 | SYSTOLIC BLOOD PRESSURE: 132 MMHG | OXYGEN SATURATION: 97 % | HEIGHT: 72 IN | WEIGHT: 250 LBS | HEART RATE: 87 BPM | DIASTOLIC BLOOD PRESSURE: 59 MMHG

## 2020-09-17 DIAGNOSIS — R07.9 CHEST PAIN, UNSPECIFIED TYPE: Primary | ICD-10-CM

## 2020-09-17 LAB
6-ACETYL MORPHINE: NEGATIVE
ALBUMIN SERPL-MCNC: 4.19 G/DL (ref 3.5–5.2)
ALBUMIN/GLOB SERPL: 1.3 G/DL
ALP SERPL-CCNC: 63 U/L (ref 39–117)
ALT SERPL W P-5'-P-CCNC: 24 U/L (ref 1–41)
AMPHET+METHAMPHET UR QL: NEGATIVE
ANION GAP SERPL CALCULATED.3IONS-SCNC: 12.6 MMOL/L (ref 5–15)
AST SERPL-CCNC: 19 U/L (ref 1–40)
BARBITURATES UR QL SCN: NEGATIVE
BASOPHILS # BLD AUTO: 0.03 10*3/MM3 (ref 0–0.2)
BASOPHILS NFR BLD AUTO: 0.3 % (ref 0–1.5)
BENZODIAZ UR QL SCN: NEGATIVE
BILIRUB SERPL-MCNC: 0.7 MG/DL (ref 0–1.2)
BILIRUB UR QL STRIP: NEGATIVE
BUN SERPL-MCNC: 13 MG/DL (ref 6–20)
BUN/CREAT SERPL: 14 (ref 7–25)
BUPRENORPHINE SERPL-MCNC: NEGATIVE NG/ML
CALCIUM SPEC-SCNC: 8.6 MG/DL (ref 8.6–10.5)
CANNABINOIDS SERPL QL: NEGATIVE
CHLORIDE SERPL-SCNC: 99 MMOL/L (ref 98–107)
CLARITY UR: CLEAR
CO2 SERPL-SCNC: 22.4 MMOL/L (ref 22–29)
COCAINE UR QL: NEGATIVE
COLOR UR: YELLOW
CREAT SERPL-MCNC: 0.93 MG/DL (ref 0.76–1.27)
D DIMER PPP FEU-MCNC: 0.84 MCGFEU/ML (ref 0–0.5)
DEPRECATED RDW RBC AUTO: 42.6 FL (ref 37–54)
EOSINOPHIL # BLD AUTO: 0.06 10*3/MM3 (ref 0–0.4)
EOSINOPHIL NFR BLD AUTO: 0.6 % (ref 0.3–6.2)
ERYTHROCYTE [DISTWIDTH] IN BLOOD BY AUTOMATED COUNT: 12.8 % (ref 12.3–15.4)
GFR SERPL CREATININE-BSD FRML MDRD: 90 ML/MIN/1.73
GLOBULIN UR ELPH-MCNC: 3.2 GM/DL
GLUCOSE SERPL-MCNC: 95 MG/DL (ref 65–99)
GLUCOSE UR STRIP-MCNC: NEGATIVE MG/DL
HCT VFR BLD AUTO: 40.5 % (ref 37.5–51)
HGB BLD-MCNC: 13.1 G/DL (ref 13–17.7)
HGB UR QL STRIP.AUTO: NEGATIVE
HOLD SPECIMEN: NORMAL
HOLD SPECIMEN: NORMAL
IMM GRANULOCYTES # BLD AUTO: 0.03 10*3/MM3 (ref 0–0.05)
IMM GRANULOCYTES NFR BLD AUTO: 0.3 % (ref 0–0.5)
KETONES UR QL STRIP: NEGATIVE
LEUKOCYTE ESTERASE UR QL STRIP.AUTO: NEGATIVE
LIPASE SERPL-CCNC: 18 U/L (ref 13–60)
LYMPHOCYTES # BLD AUTO: 1.06 10*3/MM3 (ref 0.7–3.1)
LYMPHOCYTES NFR BLD AUTO: 10.8 % (ref 19.6–45.3)
MAGNESIUM SERPL-MCNC: 1.8 MG/DL (ref 1.6–2.6)
MCH RBC QN AUTO: 29.6 PG (ref 26.6–33)
MCHC RBC AUTO-ENTMCNC: 32.3 G/DL (ref 31.5–35.7)
MCV RBC AUTO: 91.6 FL (ref 79–97)
METHADONE UR QL SCN: NEGATIVE
MONOCYTES # BLD AUTO: 0.8 10*3/MM3 (ref 0.1–0.9)
MONOCYTES NFR BLD AUTO: 8.1 % (ref 5–12)
NEUTROPHILS NFR BLD AUTO: 7.85 10*3/MM3 (ref 1.7–7)
NEUTROPHILS NFR BLD AUTO: 79.9 % (ref 42.7–76)
NITRITE UR QL STRIP: NEGATIVE
NRBC BLD AUTO-RTO: 0 /100 WBC (ref 0–0.2)
NT-PROBNP SERPL-MCNC: 115.8 PG/ML (ref 0–450)
OPIATES UR QL: POSITIVE
OXYCODONE UR QL SCN: NEGATIVE
PCP UR QL SCN: NEGATIVE
PH UR STRIP.AUTO: 6 [PH] (ref 5–8)
PLATELET # BLD AUTO: 216 10*3/MM3 (ref 140–450)
PMV BLD AUTO: 10.2 FL (ref 6–12)
POTASSIUM SERPL-SCNC: 3.6 MMOL/L (ref 3.5–5.2)
PROT SERPL-MCNC: 7.4 G/DL (ref 6–8.5)
PROT UR QL STRIP: NEGATIVE
RBC # BLD AUTO: 4.42 10*6/MM3 (ref 4.14–5.8)
SODIUM SERPL-SCNC: 134 MMOL/L (ref 136–145)
SP GR UR STRIP: 1.02 (ref 1–1.03)
T4 FREE SERPL-MCNC: 0.95 NG/DL (ref 0.93–1.7)
TROPONIN T SERPL-MCNC: <0.01 NG/ML (ref 0–0.03)
TROPONIN T SERPL-MCNC: <0.01 NG/ML (ref 0–0.03)
TSH SERPL DL<=0.05 MIU/L-ACNC: 0.88 UIU/ML (ref 0.27–4.2)
UROBILINOGEN UR QL STRIP: ABNORMAL
WBC # BLD AUTO: 9.83 10*3/MM3 (ref 3.4–10.8)
WHOLE BLOOD HOLD SPECIMEN: NORMAL
WHOLE BLOOD HOLD SPECIMEN: NORMAL

## 2020-09-17 PROCEDURE — 93010 ELECTROCARDIOGRAM REPORT: CPT | Performed by: INTERNAL MEDICINE

## 2020-09-17 PROCEDURE — 83690 ASSAY OF LIPASE: CPT | Performed by: EMERGENCY MEDICINE

## 2020-09-17 PROCEDURE — 81003 URINALYSIS AUTO W/O SCOPE: CPT | Performed by: EMERGENCY MEDICINE

## 2020-09-17 PROCEDURE — 80307 DRUG TEST PRSMV CHEM ANLYZR: CPT | Performed by: EMERGENCY MEDICINE

## 2020-09-17 PROCEDURE — 84439 ASSAY OF FREE THYROXINE: CPT | Performed by: EMERGENCY MEDICINE

## 2020-09-17 PROCEDURE — 71275 CT ANGIOGRAPHY CHEST: CPT | Performed by: RADIOLOGY

## 2020-09-17 PROCEDURE — 96361 HYDRATE IV INFUSION ADD-ON: CPT

## 2020-09-17 PROCEDURE — 83880 ASSAY OF NATRIURETIC PEPTIDE: CPT | Performed by: EMERGENCY MEDICINE

## 2020-09-17 PROCEDURE — 25010000002 MORPHINE PER 10 MG: Performed by: EMERGENCY MEDICINE

## 2020-09-17 PROCEDURE — 84484 ASSAY OF TROPONIN QUANT: CPT | Performed by: EMERGENCY MEDICINE

## 2020-09-17 PROCEDURE — 71045 X-RAY EXAM CHEST 1 VIEW: CPT | Performed by: RADIOLOGY

## 2020-09-17 PROCEDURE — 99285 EMERGENCY DEPT VISIT HI MDM: CPT

## 2020-09-17 PROCEDURE — 63710000001 ONDANSETRON ODT 4 MG TABLET DISPERSIBLE: Performed by: EMERGENCY MEDICINE

## 2020-09-17 PROCEDURE — 85379 FIBRIN DEGRADATION QUANT: CPT | Performed by: EMERGENCY MEDICINE

## 2020-09-17 PROCEDURE — 80053 COMPREHEN METABOLIC PANEL: CPT | Performed by: EMERGENCY MEDICINE

## 2020-09-17 PROCEDURE — 96374 THER/PROPH/DIAG INJ IV PUSH: CPT

## 2020-09-17 PROCEDURE — 83735 ASSAY OF MAGNESIUM: CPT | Performed by: EMERGENCY MEDICINE

## 2020-09-17 PROCEDURE — 71045 X-RAY EXAM CHEST 1 VIEW: CPT

## 2020-09-17 PROCEDURE — 84443 ASSAY THYROID STIM HORMONE: CPT | Performed by: EMERGENCY MEDICINE

## 2020-09-17 PROCEDURE — 99284 EMERGENCY DEPT VISIT MOD MDM: CPT

## 2020-09-17 PROCEDURE — 85025 COMPLETE CBC W/AUTO DIFF WBC: CPT | Performed by: EMERGENCY MEDICINE

## 2020-09-17 PROCEDURE — 0 IOVERSOL 74 % SOLUTION: Performed by: EMERGENCY MEDICINE

## 2020-09-17 PROCEDURE — 93005 ELECTROCARDIOGRAM TRACING: CPT | Performed by: EMERGENCY MEDICINE

## 2020-09-17 PROCEDURE — 71275 CT ANGIOGRAPHY CHEST: CPT

## 2020-09-17 RX ORDER — ONDANSETRON 4 MG/1
8 TABLET, ORALLY DISINTEGRATING ORAL ONCE
Status: COMPLETED | OUTPATIENT
Start: 2020-09-17 | End: 2020-09-17

## 2020-09-17 RX ORDER — ASPIRIN 81 MG/1
81 TABLET ORAL DAILY
Qty: 30 TABLET | Refills: 0 | Status: SHIPPED | OUTPATIENT
Start: 2020-09-17 | End: 2023-03-01

## 2020-09-17 RX ORDER — SODIUM CHLORIDE 9 MG/ML
125 INJECTION, SOLUTION INTRAVENOUS CONTINUOUS
Status: DISCONTINUED | OUTPATIENT
Start: 2020-09-17 | End: 2020-09-17 | Stop reason: HOSPADM

## 2020-09-17 RX ORDER — ASPIRIN 81 MG/1
324 TABLET, CHEWABLE ORAL ONCE
Status: COMPLETED | OUTPATIENT
Start: 2020-09-17 | End: 2020-09-17

## 2020-09-17 RX ADMIN — NITROGLYCERIN 1 INCH: 20 OINTMENT TOPICAL at 12:36

## 2020-09-17 RX ADMIN — IOVERSOL 100 ML: 741 INJECTION INTRA-ARTERIAL; INTRAVENOUS at 15:12

## 2020-09-17 RX ADMIN — ONDANSETRON 8 MG: 4 TABLET, ORALLY DISINTEGRATING ORAL at 12:36

## 2020-09-17 RX ADMIN — ASPIRIN 324 MG: 81 TABLET, CHEWABLE ORAL at 12:36

## 2020-09-17 RX ADMIN — SODIUM CHLORIDE 125 ML/HR: 9 INJECTION, SOLUTION INTRAVENOUS at 12:35

## 2020-09-17 RX ADMIN — MORPHINE SULFATE 4 MG: 4 INJECTION, SOLUTION INTRAMUSCULAR; INTRAVENOUS at 12:36

## 2020-09-17 NOTE — DISCHARGE INSTRUCTIONS
Home to rest.  No strenuous activity.  Drink plenty of fluids.  Take an 81 mg aspirin daily.  The hospital will contact you at the number you provided with your appointment for your nuclear stress test.  See your family doctor in the office in 1 to 2 days.  Return to the emergency department immediately if symptoms worsen/any problems.

## 2020-09-17 NOTE — ED PROVIDER NOTES
"Subjective   Patient is a 40-year-old male who presents with a chief complaint of chest pain.  He awoke with this pain early this morning.  This is sharp, stabbing pain at the left sternal border area that \"shoots\" to his left shoulder and to his left shoulder blade area.  He reports that he feels short of breath.  He denies nausea, vomiting, diaphoresis, other associated symptoms or complaints.  Patient still reports chronic lower back pain, unchanged from usual.  He complains of longstanding intermittent numbness, tingling, burning stinging pain in his left arm.  He had MRIs of his lumbar and cervical spine in 2019; the C-spine MRI did show disc bulging with impingement on the neural foramina on the left at C6-7.  I discussed this with the patient, he was unaware of his bulging disc with impingement.          Review of Systems   Constitutional: Negative for chills, diaphoresis and fever.   HENT: Negative for ear pain, sore throat and trouble swallowing.    Eyes: Negative for photophobia and pain.   Respiratory: Positive for shortness of breath. Negative for wheezing and stridor.    Cardiovascular: Positive for chest pain. Negative for palpitations.   Gastrointestinal: Negative for abdominal distention, abdominal pain, blood in stool, diarrhea, nausea and vomiting.   Endocrine: Negative for polydipsia and polyphagia.   Genitourinary: Negative for difficulty urinating and flank pain.   Musculoskeletal: Negative for gait problem and neck stiffness.   Skin: Negative for color change and pallor.   Neurological: Negative for seizures, syncope and speech difficulty.   Psychiatric/Behavioral: Negative for confusion.   All other systems reviewed and are negative.      Past Medical History:   Diagnosis Date   • Hypertension    • Knee pain, left        No Known Allergies    Past Surgical History:   Procedure Laterality Date   • CHOLECYSTECTOMY         Family History   Problem Relation Age of Onset   • Diabetes Brother    • " Diabetes Mother        Social History     Socioeconomic History   • Marital status: Single     Spouse name: Not on file   • Number of children: Not on file   • Years of education: Not on file   • Highest education level: Not on file   Tobacco Use   • Smoking status: Never Smoker   • Smokeless tobacco: Current User   Substance and Sexual Activity   • Alcohol use: Yes     Comment: occasional    • Drug use: No   • Sexual activity: Defer           Objective   Physical Exam  Vitals signs and nursing note reviewed.   Constitutional:       General: He is not in acute distress.     Appearance: He is well-developed. He is not ill-appearing, toxic-appearing or diaphoretic.      Comments: Well-developed well-nourished male, alert and well-oriented.  He is in no apparent acute distress.  He appears comfortable and well.   HENT:      Head: Normocephalic and atraumatic.   Eyes:      General: No scleral icterus.     Pupils: Pupils are equal, round, and reactive to light.   Neck:      Musculoskeletal: Normal range of motion and neck supple. No neck rigidity.      Trachea: No tracheal deviation.   Cardiovascular:      Rate and Rhythm: Normal rate and regular rhythm.   Pulmonary:      Effort: Pulmonary effort is normal. No respiratory distress.      Breath sounds: Normal breath sounds.   Chest:      Chest wall: Tenderness (There is tenderness of the left chest at the sternal border costochondral junctions and in the left upper pectoralis musculature.) present.   Abdominal:      General: Bowel sounds are normal.      Palpations: Abdomen is soft.      Tenderness: There is no abdominal tenderness. There is no guarding or rebound.   Musculoskeletal: Normal range of motion.         General: No tenderness.   Skin:     General: Skin is warm and dry.      Capillary Refill: Capillary refill takes less than 2 seconds.      Coloration: Skin is not pale.   Neurological:      General: No focal deficit present.      Mental Status: He is alert and  oriented to person, place, and time.      GCS: GCS eye subscore is 4. GCS verbal subscore is 5. GCS motor subscore is 6.      Cranial Nerves: Cranial nerves are intact.      Sensory: Sensation is intact.      Motor: Motor function is intact.      Coordination: Coordination is intact.      Gait: Gait is intact.   Psychiatric:         Behavior: Behavior normal.         Procedures           ED Course  ED Course as of Sep 18 1157   Thu Sep 17, 2020   1616 Patient's emergency department stay has been entirely uneventful.  Never has he shown any signs of distress.  He voices he feels much better, and he wants to go home.  We discussed all of his test results.  He voices understanding.  We will schedule him for an outpatient nuclear stress test.  He will follow-up closely with his PCP.  He will return to the emergency department immediately if his symptoms worsen or if he has any problems.    [CM]      ED Course User Index  [CM] Abelardo Patricia MD                                           [facility]    Final diagnoses:   Chest pain, unspecified type             Please note that portions of this note were completed with a voice recognition program.        Abelardo Patricia MD  09/18/20 5107

## 2020-09-21 ENCOUNTER — TRANSCRIBE ORDERS (OUTPATIENT)
Dept: ADMINISTRATIVE | Facility: HOSPITAL | Age: 40
End: 2020-09-21

## 2020-09-21 DIAGNOSIS — R07.9 CHEST PAIN, UNSPECIFIED TYPE: Primary | ICD-10-CM

## 2020-09-23 ENCOUNTER — HOSPITAL ENCOUNTER (OUTPATIENT)
Dept: NUCLEAR MEDICINE | Facility: HOSPITAL | Age: 40
Discharge: HOME | End: 2020-09-23

## 2020-09-23 ENCOUNTER — HOSPITAL ENCOUNTER (OUTPATIENT)
Dept: CARDIOLOGY | Facility: HOSPITAL | Age: 40
Discharge: HOME | End: 2020-09-23

## 2020-09-23 DIAGNOSIS — R07.9 CHEST PAIN, UNSPECIFIED TYPE: ICD-10-CM

## 2020-09-23 LAB
BH CV NUCLEAR PRIOR STUDY: 3
BH CV STRESS BP STAGE 1: NORMAL
BH CV STRESS BP STAGE 2: NORMAL
BH CV STRESS COMMENTS STAGE 1: NORMAL
BH CV STRESS COMMENTS STAGE 2: NORMAL
BH CV STRESS DOSE REGADENOSON STAGE 1: 0.4
BH CV STRESS DURATION MIN STAGE 1: 0
BH CV STRESS DURATION MIN STAGE 2: 4
BH CV STRESS DURATION SEC STAGE 1: 10
BH CV STRESS DURATION SEC STAGE 2: 0
BH CV STRESS HR STAGE 1: 81
BH CV STRESS HR STAGE 2: 78
BH CV STRESS PROTOCOL 1: NORMAL
BH CV STRESS RECOVERY BP: NORMAL MMHG
BH CV STRESS RECOVERY HR: 60 BPM
BH CV STRESS STAGE 1: 1
BH CV STRESS STAGE 2: 2
LV EF NUC BP: 50 %
MAXIMAL PREDICTED HEART RATE: 180 BPM
PERCENT MAX PREDICTED HR: 43.33 %
STRESS BASELINE BP: NORMAL MMHG
STRESS BASELINE HR: 57 BPM
STRESS PERCENT HR: 51 %
STRESS POST PEAK BP: NORMAL MMHG
STRESS POST PEAK HR: 78 BPM
STRESS TARGET HR: 153 BPM

## 2020-09-23 PROCEDURE — 78452 HT MUSCLE IMAGE SPECT MULT: CPT | Performed by: INTERNAL MEDICINE

## 2020-09-23 PROCEDURE — 25010000002 REGADENOSON 0.4 MG/5ML SOLUTION: Performed by: INTERNAL MEDICINE

## 2020-09-23 PROCEDURE — 93017 CV STRESS TEST TRACING ONLY: CPT

## 2020-09-23 PROCEDURE — 0 TECHNETIUM SESTAMIBI: Performed by: EMERGENCY MEDICINE

## 2020-09-23 PROCEDURE — A9500 TC99M SESTAMIBI: HCPCS | Performed by: EMERGENCY MEDICINE

## 2020-09-23 PROCEDURE — 93018 CV STRESS TEST I&R ONLY: CPT | Performed by: INTERNAL MEDICINE

## 2020-09-23 PROCEDURE — 78452 HT MUSCLE IMAGE SPECT MULT: CPT

## 2020-09-23 RX ORDER — HYDROCODONE BITARTRATE AND ACETAMINOPHEN 7.5; 325 MG/1; MG/1
1 TABLET ORAL 3 TIMES DAILY
Status: ON HOLD | COMMUNITY
End: 2021-06-24 | Stop reason: SDUPTHER

## 2020-09-23 RX ORDER — GABAPENTIN 400 MG/1
400 CAPSULE ORAL 3 TIMES DAILY
COMMUNITY
End: 2021-09-16

## 2020-09-23 RX ORDER — MELOXICAM 15 MG/1
15 TABLET ORAL DAILY
COMMUNITY
End: 2021-08-12 | Stop reason: HOSPADM

## 2020-09-23 RX ORDER — FLUOXETINE HYDROCHLORIDE 20 MG/1
20 CAPSULE ORAL DAILY
COMMUNITY
End: 2021-07-16

## 2020-09-23 RX ADMIN — TECHNETIUM TC 99M SESTAMIBI 1 DOSE: 1 INJECTION INTRAVENOUS at 07:30

## 2020-09-23 RX ADMIN — REGADENOSON 0.4 MG: 0.08 INJECTION, SOLUTION INTRAVENOUS at 08:48

## 2020-09-23 RX ADMIN — TECHNETIUM TC 99M SESTAMIBI 1 DOSE: 1 INJECTION INTRAVENOUS at 08:48

## 2021-03-15 ENCOUNTER — BULK ORDERING (OUTPATIENT)
Dept: CASE MANAGEMENT | Facility: OTHER | Age: 41
End: 2021-03-15

## 2021-03-15 DIAGNOSIS — Z23 IMMUNIZATION DUE: ICD-10-CM

## 2021-04-06 ENCOUNTER — OFFICE VISIT (OUTPATIENT)
Dept: NEUROSURGERY | Facility: CLINIC | Age: 41
End: 2021-04-06

## 2021-04-06 VITALS
SYSTOLIC BLOOD PRESSURE: 134 MMHG | OXYGEN SATURATION: 98 % | RESPIRATION RATE: 18 BRPM | TEMPERATURE: 97.6 F | HEIGHT: 72 IN | DIASTOLIC BLOOD PRESSURE: 90 MMHG | WEIGHT: 254.2 LBS | BODY MASS INDEX: 34.43 KG/M2 | HEART RATE: 70 BPM

## 2021-04-06 DIAGNOSIS — M51.36 DDD (DEGENERATIVE DISC DISEASE), LUMBAR: ICD-10-CM

## 2021-04-06 DIAGNOSIS — M48.062 SPINAL STENOSIS, LUMBAR REGION, WITH NEUROGENIC CLAUDICATION: Primary | ICD-10-CM

## 2021-04-06 DIAGNOSIS — G89.29 CHRONIC BILATERAL LOW BACK PAIN WITH BILATERAL SCIATICA: ICD-10-CM

## 2021-04-06 DIAGNOSIS — M54.42 CHRONIC BILATERAL LOW BACK PAIN WITH BILATERAL SCIATICA: ICD-10-CM

## 2021-04-06 DIAGNOSIS — E66.01 MORBID (SEVERE) OBESITY DUE TO EXCESS CALORIES (HCC): ICD-10-CM

## 2021-04-06 DIAGNOSIS — R53.81 PHYSICAL DECONDITIONING: ICD-10-CM

## 2021-04-06 DIAGNOSIS — M19.90 ARTHRITIS: ICD-10-CM

## 2021-04-06 DIAGNOSIS — M54.41 CHRONIC BILATERAL LOW BACK PAIN WITH BILATERAL SCIATICA: ICD-10-CM

## 2021-04-06 PROCEDURE — 99204 OFFICE O/P NEW MOD 45 MIN: CPT | Performed by: PHYSICIAN ASSISTANT

## 2021-04-06 RX ORDER — LOSARTAN POTASSIUM 25 MG/1
25 TABLET ORAL DAILY
COMMUNITY
End: 2021-06-23

## 2021-04-06 RX ORDER — ATORVASTATIN CALCIUM 20 MG/1
20 TABLET, FILM COATED ORAL DAILY
COMMUNITY

## 2021-04-06 RX ORDER — HYDROXYZINE HYDROCHLORIDE 25 MG/1
25 TABLET, FILM COATED ORAL 3 TIMES DAILY PRN
COMMUNITY
End: 2021-06-23

## 2021-04-06 RX ORDER — DULAGLUTIDE 0.75 MG/.5ML
0.75 INJECTION, SOLUTION SUBCUTANEOUS WEEKLY
COMMUNITY
End: 2023-03-01

## 2021-04-06 RX ORDER — NABUMETONE 500 MG/1
500 TABLET, FILM COATED ORAL 2 TIMES DAILY PRN
COMMUNITY

## 2021-04-06 NOTE — PROGRESS NOTES
Patient: Wenceslao Nava  : 1980  Chart #: [id number]    Date of Service: 2021    Chief Complaint   Patient presents with   • Back Pain   • Numbness     Bilateral Lower Extremity        HPI     Patient presents with back pain that started in 2019 after lifting metal at work. Had a popping in his neck, bad neck pain, saw Neurosurgery in [facility], where he had an ACDF.  He had improvement in his arm symptoms but continues to have some neck and left arm pain.  He has back pain that goes from his neck to his lower back.  His back pain is present all the time, worse with sitting and standing.  He went to physical therapy from [id number] without significant improvement in his back pain, he is currently attending pain management at [facility] in [place].  He has had multiple injections and rhizotomies which are not helping his back and leg pain.  He feels his right leg symptoms are worse than his left.  He has numbness in the entire leg, worse in the anterior leg, numbness goes down into his toes at times, he also gets shooting pains down his leg to the ankle  Moderate CTS on the right and mild on the left- EMG .      Past Medical History:   Diagnosis Date   • Arthritis    • Hypertension    • Knee pain, left        No Known Allergies      Current Outpatient Medications:   •  aspirin (aspirin) 81 MG EC tablet, Take 1 tablet by mouth Daily., Disp: 30 tablet, Rfl: 0  •  atorvastatin (LIPITOR) 20 MG tablet, Take 20 mg by mouth Daily., Disp: , Rfl:   •  Dulaglutide (Trulicity) 0.75 MG/0.5ML solution pen-injector, Trulicity 0.75 mg/0.5 mL subcutaneous pen injector, Disp: , Rfl:   •  FLUoxetine (PROzac) 20 MG capsule, Take 20 mg by mouth Daily., Disp: , Rfl:   •  gabapentin (NEURONTIN) 400 MG capsule, Take 400 mg by mouth 3 (Three) Times a Day., Disp: , Rfl:   •  HYDROcodone-acetaminophen (NORCO) 7.5-325 MG per tablet, Take 1 tablet by mouth Every 6 (Six) Hours As Needed  for Moderate Pain ., Disp: , Rfl:   •  hydrOXYzine (ATARAX) 25 MG tablet, Take 25 mg by mouth 3 (Three) Times a Day As Needed for Itching., Disp: , Rfl:   •  losartan (COZAAR) 25 MG tablet, Take 25 mg by mouth Daily., Disp: , Rfl:   •  metFORMIN (GLUCOPHAGE) 1000 MG tablet, Take 1,000 mg by mouth 2 (Two) Times a Day With Meals., Disp: , Rfl:   •  nabumetone (RELAFEN) 500 MG tablet, Take 500 mg by mouth 2 (Two) Times a Day As Needed for Mild Pain ., Disp: , Rfl:   •  lisinopril-hydrochlorothiazide (PRINZIDE,ZESTORETIC) 10-12.5 MG per tablet, Take 1 tablet by mouth Daily., Disp: 15 tablet, Rfl: 0  •  meloxicam (MOBIC) 15 MG tablet, Take 15 mg by mouth Daily., Disp: , Rfl:     Social History     Socioeconomic History   • Marital status: Single     Spouse name: Not on file   • Number of children: Not on file   • Years of education: Not on file   • Highest education level: Not on file   Tobacco Use   • Smoking status: Never Smoker   • Smokeless tobacco: Current User   Vaping Use   • Vaping Use: Never used   Substance and Sexual Activity   • Alcohol use: Yes     Comment: occasional    • Drug use: No   • Sexual activity: Defer       Family History   Problem Relation Age of Onset   • Diabetes Brother    • Hypertension Brother    • Diabetes Mother        Review of Systems   Constitutional: Negative for activity change, appetite change, chills, diaphoresis, fatigue, fever and unexpected weight change.   HENT: Negative for congestion, dental problem, drooling, ear discharge, ear pain, facial swelling, hearing loss, mouth sores, nosebleeds, postnasal drip, rhinorrhea, sinus pressure, sinus pain, sneezing, sore throat, tinnitus, trouble swallowing and voice change.    Eyes: Negative for photophobia, pain, discharge, redness, itching and visual disturbance.   Respiratory: Negative for apnea, cough, choking, chest tightness, shortness of breath, wheezing and stridor.    Cardiovascular: Negative for chest pain, palpitations and leg  "swelling.   Gastrointestinal: Negative for abdominal distention, abdominal pain, anal bleeding, blood in stool, constipation, diarrhea, nausea, rectal pain and vomiting.   Endocrine: Negative for cold intolerance, heat intolerance, polydipsia, polyphagia and polyuria.   Genitourinary: Negative for decreased urine volume, difficulty urinating, discharge, dysuria, enuresis, flank pain, frequency, genital sores, hematuria, penile pain, penile swelling, scrotal swelling, testicular pain and urgency.   Musculoskeletal: Positive for back pain, joint swelling, neck pain and neck stiffness. Negative for arthralgias, gait problem and myalgias.   Skin: Negative for color change, pallor, rash and wound.   Allergic/Immunologic: Negative for environmental allergies, food allergies and immunocompromised state.   Neurological: Positive for dizziness, tremors, weakness, numbness and headaches. Negative for seizures, syncope, facial asymmetry, speech difficulty and light-headedness.   Hematological: Negative for adenopathy. Does not bruise/bleed easily.   Psychiatric/Behavioral: Positive for decreased concentration and sleep disturbance. Negative for agitation, behavioral problems, confusion, dysphoric mood, hallucinations, self-injury and suicidal ideas. The patient is nervous/anxious. The patient is not hyperactive.        Patient's Body mass index is 34.48 kg/m². BMI is above normal parameters. Recommendations include: educational material, exercise counseling, nutrition counseling and referral to primary care.       Social History    Tobacco Use      Smoking status: Never Smoker      Smokeless tobacco: Current User  He partakes of snuff on a daily basis, his can is in his right front pocket.    Physical examination:  Blood pressure 134/90, pulse 70, temperature 97.6 °F (36.4 °C), resp. rate 18, height 182.9 cm (72\"), weight 115 kg (254 lb 3.2 oz), SpO2 98 %.  HEENT- normocephalic, atraumatic, sclera clear, he has a left lazy " eye, vision is clear.  Lungs-normal expansion, no wheezing  Heart-regular rate and rhythm  Extremities-positive pulses, no edema    Neurologic Exam    WDWN white male  A/A/C, speech clear, attention normal, conversant, answers questions appropriately, good historian.  Cranial nerves II through XII are intact  Motor examination does not reveal weakness in the , upper or lower extremities.   Sensation is slightly less in the right anterior thigh as compared to the left.  Gait is normal, balance is normal.  He is able to walk on his toes and walk on his heels without any evidence of weakness.  No tremors are noted.  Reflexes are intact in the legs, decreased in the upper extremities.       Radiographic Imaging:  I have personally reviewed imaging and outside results. I have independently interpreted the imaging and discussed with the patient the findings and appropriate management.  MRI from 2019 shows normal alignment. Has 2 degenerative discs at L2-3 and L3-4 with bilateral disc bulging, bilateral foraminal narrowing, facet hypertrophy.  L5-S1 looks good.     Medical Decision Making  Assessment and Plan:  1.  Lumbar spinal stenosis, L3-4 and L4-5  2.  Moderate bilateral foraminal narrowing L3-4 and L4-5  3.  Degenerative disc disease L3-4 and L4-5  4.  Osteoarthritis, lumbar spine  5.  Obesity, BMI 34.48  6.  Physical deconditioning-poor posture.  7.  Chronic low back pain not improving with pain management medications, injections and rhizotomies.  I am making referral to physical therapy for the patient to learn Henna exercises of the lumbar spine to help with his posture and gait.  He will also need a new MRI scan of the lumbar spine to evaluate for worsening spinal stenosis and nerve root compression that would require surgical intervention.  He has been attending pain management and been compliant but unfortunately continues to struggle.  We will obtain new studies have him attend a few visits of physical  therapy and we will have a follow-up telemedicine visit in 3 weeks.    Fabby Peña PA-C        Patient Care Team:  Flores Chua PA as PCP - General (Physician Assistant)  Flores Chua PA as Referring Physician (Physician Assistant)

## 2021-04-06 NOTE — PATIENT INSTRUCTIONS
Preventing Unhealthy Weight Gain, Adult  Staying at a healthy weight is important to your overall health. When fat builds up in your body, you may become overweight or obese. Being overweight or obese increases your risk of developing certain health problems, such as heart disease, diabetes, sleeping problems, joint problems, and some types of cancer.  Unhealthy weight gain is often the result of making unhealthy food choices or not getting enough exercise. You can make changes to your lifestyle to prevent obesity and stay as healthy as possible.  What nutrition changes can be made?    · Eat only as much as your body needs. To do this:  ? Pay attention to signs that you are hungry or full. Stop eating as soon as you feel full.  ? If you feel hungry, try drinking water first before eating. Drink enough water so your urine is clear or pale yellow.  ? Eat smaller portions. Pay attention to portion sizes when eating out.  ? Look at serving sizes on food labels. Most foods contain more than one serving per container.  ? Eat the recommended number of calories for your gender and activity level. For most active people, a daily total of 2,000 calories is appropriate. If you are trying to lose weight or are not very active, you may need to eat fewer calories. Talk with your health care provider or a diet and nutrition specialist (dietitian) about how many calories you need each day.  · Choose healthy foods, such as:  ? Fruits and vegetables. At each meal, try to fill at least half of your plate with fruits and vegetables.  ? Whole grains, such as whole-wheat bread, brown rice, and quinoa.  ? Lean meats, such as chicken or fish.  ? Other healthy proteins, such as beans, eggs, or tofu.  ? Healthy fats, such as nuts, seeds, fatty fish, and olive oil.  ? Low-fat or fat-free dairy products.  · Check food labels, and avoid food and drinks that:  ? Are high in calories.  ? Have added sugar.  ? Are high in sodium.  ? Have saturated  fats or trans fats.  · Cook foods in healthier ways, such as by baking, broiling, or grilling.  · Make a meal plan for the week, and shop with a grocery list to help you stay on track with your purchases. Try to avoid going to the grocery store when you are hungry.  · When grocery shopping, try to shop around the outside of the store first, where the fresh foods are. Doing this helps you to avoid prepackaged foods, which can be high in sugar, salt (sodium), and fat.  What lifestyle changes can be made?    · Exercise for 30 or more minutes on 5 or more days each week. Exercising may include brisk walking, yard work, biking, running, swimming, and team sports like basketball and soccer. Ask your health care provider which exercises are safe for you.  · Do muscle-strengthening activities, such as lifting weights or using resistance bands, on 2 or more days a week.  · Do not use any products that contain nicotine or tobacco, such as cigarettes and e-cigarettes. If you need help quitting, ask your health care provider.  · Limit alcohol intake to no more than 1 drink a day for nonpregnant women and 2 drinks a day for men. One drink equals 12 oz of beer, 5 oz of wine, or 1½ oz of hard liquor.  · Try to get 7-9 hours of sleep each night.  What other changes can be made?  · Keep a food and activity journal to keep track of:  ? What you ate and how many calories you had. Remember to count the calories in sauces, dressings, and side dishes.  ? Whether you were active, and what exercises you did.  ? Your calorie, weight, and activity goals.  · Check your weight regularly. Track any changes. If you notice you have gained weight, make changes to your diet or activity routine.  · Avoid taking weight-loss medicines or supplements. Talk to your health care provider before starting any new medicine or supplement.  · Talk to your health care provider before trying any new diet or exercise plan.  Why are these changes  important?  Eating healthy, staying active, and having healthy habits can help you to prevent obesity. Those changes also:  · Help you manage stress and emotions.  · Help you connect with friends and family.  · Improve your self-esteem.  · Improve your sleep.  · Prevent long-term health problems.  What can happen if changes are not made?  Being obese or overweight can cause you to develop joint or bone problems, which can make it hard for you to stay active or do activities you enjoy. Being obese or overweight also puts stress on your heart and lungs and can lead to health problems like diabetes, heart disease, and some cancers.  Where to find more information  Talk with your health care provider or a dietitian about healthy eating and healthy lifestyle choices. You may also find information from:  · [place] Department of Agriculture, MyPlate: www.Employyd.commyplate.gov  · American Heart Association: www.heart.org  · Centers for Disease Control and Prevention: www.cdc.gov  Summary  · Staying at a healthy weight is important to your overall health. It helps you to prevent certain diseases and health problems, such as heart disease, diabetes, joint problems, sleep disorders, and some types of cancer.  · Being obese or overweight can cause you to develop joint or bone problems, which can make it hard for you to stay active or do activities you enjoy.  · You can prevent unhealthy weight gain by eating a healthy diet, exercising regularly, not smoking, limiting alcohol, and getting enough sleep.  · Talk with your health care provider or a dietitian for guidance about healthy eating and healthy lifestyle choices.  This information is not intended to replace advice given to you by your health care provider. Make sure you discuss any questions you have with your health care provider.  Document Revised: 12/21/2018 Document Reviewed: 01/24/2018  [employer] Patient Education © 2021 [employer]  Exercising to Stay Healthy  To become  healthy and stay healthy, it is recommended that you do moderate-intensity and vigorous-intensity exercise. You can tell that you are exercising at a moderate intensity if your heart starts beating faster and you start breathing faster but can still hold a conversation. You can tell that you are exercising at a vigorous intensity if you are breathing much harder and faster and cannot hold a conversation while exercising.  Exercising regularly is important. It has many health benefits, such as:  · Improving overall fitness, flexibility, and endurance.  · Increasing bone density.  · Helping with weight control.  · Decreasing body fat.  · Increasing muscle strength.  · Reducing stress and tension.  · Improving overall health.  How often should I exercise?  Choose an activity that you enjoy, and set realistic goals. Your health care provider can help you make an activity plan that works for you.  Exercise regularly as told by your health care provider. This may include:  · Doing strength training two times a week, such as:  ? Lifting weights.  ? Using resistance bands.  ? Push-ups.  ? Sit-ups.  ? Yoga.  · Doing a certain intensity of exercise for a given amount of time. Choose from these options:  ? A total of 150 minutes of moderate-intensity exercise every week.  ? A total of 75 minutes of vigorous-intensity exercise every week.  ? A mix of moderate-intensity and vigorous-intensity exercise every week.  Children, pregnant women, people who have not exercised regularly, people who are overweight, and older adults may need to talk with a health care provider about what activities are safe to do. If you have a medical condition, be sure to talk with your health care provider before you start a new exercise program.  What are some exercise ideas?  Moderate-intensity exercise ideas include:  · Walking 1 mile (1.6 km) in about 15 minutes.  · Biking.  · Hiking.  · Golfing.  · Dancing.  · Water aerobics.  Vigorous-intensity  exercise ideas include:  · Walking 4.5 miles (7.2 km) or more in about 1 hour.  · Jogging or running 5 miles (8 km) in about 1 hour.  · Biking 10 miles (16.1 km) or more in about 1 hour.  · Lap swimming.  · Roller-skating or in-line skating.  · Cross-country skiing.  · Vigorous competitive sports, such as football, basketball, and soccer.  · Jumping rope.  · Aerobic dancing.  What are some everyday activities that can help me to get exercise?  · Yard work, such as:  ? Pushing a .  ? Raking and bagging leaves.  · Washing your car.  · Pushing a stroller.  · Shoveling snow.  · Gardening.  · Washing windows or floors.  How can I be more active in my day-to-day activities?  · Use stairs instead of an elevator.  · Take a walk during your lunch break.  · If you drive, park your car farther away from your work or school.  · If you take public transportation, get off one stop early and walk the rest of the way.  · Stand up or walk around during all of your indoor phone calls.  · Get up, stretch, and walk around every 30 minutes throughout the day.  · Enjoy exercise with a friend. Support to continue exercising will help you keep a regular routine of activity.  What guidelines can I follow while exercising?  · Before you start a new exercise program, talk with your health care provider.  · Do not exercise so much that you hurt yourself, feel dizzy, or get very short of breath.  · Wear comfortable clothes and wear shoes with good support.  · Drink plenty of water while you exercise to prevent dehydration or heat stroke.  · Work out until your breathing and your heartbeat get faster.  Where to find more information  · [place] Department of Health and Human Services: www.hhs.gov  · Centers for Disease Control and Prevention (CDC): www.cdc.gov  Summary  · Exercising regularly is important. It will improve your overall fitness, flexibility, and endurance.  · Regular exercise also will improve your overall health. It can  help you control your weight, reduce stress, and improve your bone density.  · Do not exercise so much that you hurt yourself, feel dizzy, or get very short of breath.  · Before you start a new exercise program, talk with your health care provider.  This information is not intended to replace advice given to you by your health care provider. Make sure you discuss any questions you have with your health care provider.  Document Revised: 11/30/2018 Document Reviewed: 11/08/2018  [employer] Patient Education © 2021 [employer]  Healthy Eating  Following a healthy eating pattern may help you to achieve and maintain a healthy body weight, reduce the risk of chronic disease, and live a long and productive life. It is important to follow a healthy eating pattern at an appropriate calorie level for your body. Your nutritional needs should be met primarily through food by choosing a variety of nutrient-rich foods.  What are tips for following this plan?  Reading food labels  · Read labels and choose the following:  ? Reduced or low sodium.  ? Juices with 100% fruit juice.  ? Foods with low saturated fats and high polyunsaturated and monounsaturated fats.  ? Foods with whole grains, such as whole wheat, cracked wheat, brown rice, and wild rice.  ? Whole grains that are fortified with folic acid. This is recommended for women who are pregnant or who want to become pregnant.  · Read labels and avoid the following:  ? Foods with a lot of added sugars. These include foods that contain brown sugar, corn sweetener, corn syrup, dextrose, fructose, glucose, high-fructose corn syrup, honey, invert sugar, lactose, malt syrup, maltose, molasses, raw sugar, sucrose, trehalose, or turbinado sugar.  § Do not eat more than the following amounts of added sugar per day:  § 6 teaspoons (25 g) for women.  § 9 teaspoons (38 g) for men.  ? Foods that contain processed or refined starches and grains.  ? Refined grain products, such as white flour,  "degermed cornmeal, white bread, and white rice.  Shopping  · Choose nutrient-rich snacks, such as vegetables, whole fruits, and nuts. Avoid high-calorie and high-sugar snacks, such as potato chips, fruit snacks, and candy.  · Use oil-based dressings and spreads on foods instead of solid fats such as butter, stick margarine, or cream cheese.  · Limit pre-made sauces, mixes, and \"instant\" products such as flavored rice, instant noodles, and ready-made pasta.  · Try more plant-protein sources, such as tofu, tempeh, black beans, edamame, lentils, nuts, and seeds.  · Explore eating plans such as the Mediterranean diet or vegetarian diet.  Cooking  · Use oil to sauté or stir-rodriguez foods instead of solid fats such as butter, stick margarine, or lard.  · Try baking, boiling, grilling, or broiling instead of frying.  · Remove the fatty part of meats before cooking.  · Steam vegetables in water or broth.  Meal planning    · At meals, imagine dividing your plate into fourths:  ? One-half of your plate is fruits and vegetables.  ? One-fourth of your plate is whole grains.  ? One-fourth of your plate is protein, especially lean meats, poultry, eggs, tofu, beans, or nuts.  · Include low-fat dairy as part of your daily diet.  Lifestyle  · Choose healthy options in all settings, including home, work, school, restaurants, or stores.  · Prepare your food safely:  ? Wash your hands after handling raw meats.  ? Keep food preparation surfaces clean by regularly washing with hot, soapy water.  ? Keep raw meats separate from ready-to-eat foods, such as fruits and vegetables.  ? , meat, poultry, and eggs to the recommended internal temperature.  ? Store foods at safe temperatures. In general:  § Keep cold foods at 40°F (4.4°C) or below.  § Keep hot foods at 140°F (60°C) or above.  § Keep your freezer at 0°F (-17.8°C) or below.  § Foods are no longer safe to eat when they have been between the temperatures of 40°-140°F (4.4-60°C) " for more than 2 hours.  What foods should I eat?  Fruits  Aim to eat 2 cup-equivalents of fresh, canned (in natural juice), or frozen fruits each day. Examples of 1 cup-equivalent of fruit include 1 small apple, 8 large strawberries, 1 cup canned fruit, ½ cup dried fruit, or 1 cup 100% juice.  Vegetables  Aim to eat 2½-3 cup-equivalents of fresh and frozen vegetables each day, including different varieties and colors. Examples of 1 cup-equivalent of vegetables include 2 medium carrots, 2 cups raw, leafy greens, 1 cup chopped vegetable (raw or cooked), or 1 medium baked potato.  Grains  Aim to eat 6 ounce-equivalents of whole grains each day. Examples of 1 ounce-equivalent of grains include 1 slice of bread, 1 cup ready-to-eat cereal, 3 cups popcorn, or ½ cup cooked rice, pasta, or cereal.  Meats and other proteins  Aim to eat 5-6 ounce-equivalents of protein each day. Examples of 1 ounce-equivalent of protein include 1 egg, 1/2 cup nuts or seeds, or 1 tablespoon (16 g) peanut butter. A cut of meat or fish that is the size of a deck of cards is about 3-4 ounce-equivalents.  · Of the protein you eat each week, try to have at least 8 ounces come from seafood. This includes salmon, trout, herring, and anchovies.  Dairy  Aim to eat 3 cup-equivalents of fat-free or low-fat dairy each day. Examples of 1 cup-equivalent of dairy include 1 cup (240 mL) milk, 8 ounces (250 g) yogurt, 1½ ounces (44 g) natural cheese, or 1 cup (240 mL) fortified soy milk.  Fats and oils  · Aim for about 5 teaspoons (21 g) per day. Choose monounsaturated fats, such as canola and olive oils, avocados, peanut butter, and most nuts, or polyunsaturated fats, such as sunflower, corn, and soybean oils, walnuts, pine nuts, sesame seeds, sunflower seeds, and flaxseed.  Beverages  · Aim for six 8-oz glasses of water per day. Limit coffee to three to five 8-oz cups per day.  · Limit caffeinated beverages that have added calories, such as soda and energy  drinks.  · Limit alcohol intake to no more than 1 drink a day for nonpregnant women and 2 drinks a day for men. One drink equals 12 oz of beer (355 mL), 5 oz of wine (148 mL), or 1½ oz of hard liquor (44 mL).  Seasoning and other foods  · Avoid adding excess amounts of salt to your foods. Try flavoring foods with herbs and spices instead of salt.  · Avoid adding sugar to foods.  · Try using oil-based dressings, sauces, and spreads instead of solid fats.  This information is based on general [place] nutrition guidelines. For more information, visit choosemyplate.gov. Exact amounts may vary based on your nutrition needs.  Summary  · A healthy eating plan may help you to maintain a healthy weight, reduce the risk of chronic diseases, and stay active throughout your life.  · Plan your meals. Make sure you eat the right portions of a variety of nutrient-rich foods.  · Try baking, boiling, grilling, or broiling instead of frying.  · Choose healthy options in all settings, including home, work, school, restaurants, or stores.  This information is not intended to replace advice given to you by your health care provider. Make sure you discuss any questions you have with your health care provider.  Document Revised: 04/01/2019 Document Reviewed: 04/01/2019  Elsevier Patient Education © 2021 [employer]

## 2021-04-08 ENCOUNTER — TREATMENT (OUTPATIENT)
Dept: PHYSICAL THERAPY | Facility: CLINIC | Age: 41
End: 2021-04-08

## 2021-04-08 DIAGNOSIS — M51.36 DDD (DEGENERATIVE DISC DISEASE), LUMBAR: ICD-10-CM

## 2021-04-08 DIAGNOSIS — M48.062 SPINAL STENOSIS, LUMBAR REGION WITH NEUROGENIC CLAUDICATION: Primary | ICD-10-CM

## 2021-04-08 PROCEDURE — 97162 PT EVAL MOD COMPLEX 30 MIN: CPT | Performed by: PHYSICAL THERAPIST

## 2021-04-08 NOTE — PROGRESS NOTES
Physical Therapy Initial Evaluation and Plan of Care      Patient: Wenceslao Nava   : 1980  Diagnosis/ICD-10 Code:  Spinal stenosis, lumbar region with neurogenic claudication [M48.062]  Referring practitioner: Fabby Peña PA-C  Date of Initial Visit: 2021  Today's Date: 2021  Patient seen for 1 sessions           Subjective Questionnaire: Modified Oswestry: 36/50=72%      Subjective Evaluation    History of Present Illness  Onset date: .  Mechanism of injury: Patient reports that he has been experiencing low back pain since 2019, noting that pain has gotten progressively worse.  Patient notes that at that time, he believes injury occurred when he was lifting up a piece of metal.  Patient notes bilateral back pain, but notes that pain typically is worse on the right.  Patient reports that he experiences pain, numbness, tingling in the bilateral LEs, stating that it extends to the toes; however, he notes that it is worse on the right LE.  Patient reports a 10 minute sitting tolerance, 10-15 minute standing tolerance, and 10 minute walking tolerance.    Pain  Current pain ratin  At best pain ratin  At worst pain ratin  Location: Lumbar, B) LEs  Quality: burning, sharp, knife-like and needle-like  Relieving factors: rest, change in position, medications and heat  Aggravating factors: ambulation, standing, movement, prolonged positioning, lifting, sleeping and repetitive movement    Diagnostic Tests  MRI studies: abnormal (2019 MRI-bulging discs)    Patient Goals  Patient goals for therapy: decreased pain             Objective          Static Posture     Shoulders  Rounded.    Thoracic Spine  Flattened thoracic spine.    Lumbar Spine   Decreased lordosis.     Comments  Left lateral shift (hips to the right, shoulders to the left)    Palpation   Left   No palpable tenderness to the quadratus lumborum.   Hypertonic in the erector spinae, lumbar paraspinals, piriformis and  quadratus lumborum.   Tenderness of the erector spinae, gluteus suri, gluteus medius, lumbar paraspinals and piriformis.     Right   Hypertonic in the erector spinae, lumbar paraspinals, piriformis and quadratus lumborum. Tenderness of the erector spinae, gluteus suri, gluteus medius, lumbar paraspinals, piriformis and quadratus lumborum.     Tenderness     Left Hip   Tenderness in the PSIS.     Right Hip   Tenderness in the PSIS.     Active Range of Motion     Lumbar   Flexion: Active lumbar flexion: 50% with pain  Extension: Active lumbar extension: 25% with pain  Left lateral flexion: Active left lumbar lateral flexion: 25% with pain  Right lateral flexion: Active right lumbar lateral flexion: 25% with pain  Left rotation: Active left lumbar rotation: 25% with pain  Right rotation: Active right lumbar rotation: 25% with pain    Strength/Myotome Testing     Left Hip   Planes of Motion   Flexion: 3+  Abduction: 4-  Adduction: 4-    Right Hip   Planes of Motion   Flexion: 3+  Abduction: 4-  Adduction: 4-    Left Knee   Flexion: 4-  Extension: 4-    Right Knee   Flexion: 4  Extension: 4-    Tests       Thoracic   Positive slump.     Additional Tests Details  Repeated Flexion/Extension Test-Centralization reported with extension          Assessment & Plan     Assessment  Impairments: abnormal gait, abnormal muscle tone, abnormal or restricted ROM, activity intolerance, impaired physical strength, lacks appropriate home exercise program and pain with function  Assessment details: Patient is a 40 year old male who comes to physical therapy for DDD and spinal stenosis of the lumbar spine. The patient presents with increased pain, decreased lumbar ROM, and decreased LE strength. Positive special tests included Slump Test, which indicates adverse neural tension; patient also displayed an extension preference during Repeated Flexion/Extension Test.  Patient reports a 72% functional mobility impairment, based on the  patient's response to the Modified Oswestry.  Patient will benefit from skilled PT, so that patient can achieve maximum level of function.     Prognosis: good  Functional Limitations: carrying objects, lifting, sleeping, walking, pulling, pushing, uncomfortable because of pain, moving in bed, sitting, standing, stooping and unable to perform repetitive tasks  Goals  Plan Goals: SHORT TERM GOALS:     4 weeks  1. Patient will be independent/compliant with HEP.  2. Patient will report pain no greater than 5/10 when performing self-care activities.  3. Patient will report pain no greater than 5/10 when sitting to travel community distances.  4. Pt to report 25% improvement of pain/numbness/tingling into the bilateral leg to show decreased nerve compression.      LONG TERM GOALS:   8 weeks  1. Patient will show a 25% improvement of lumbar AROM to show improved ability to perform functional activities.  2. bilateral LE strength will improve to at least 4/5 to allow for greater ease with daily tasks.  3. Patient to report less than 50% impairment on the Modified Oswestry for improved functional mobility.  4. Pt to report 50% improvement of pain/numbness/tingling into the bilateral leg to show decreased nerve compression.  5. Patient will report pain no greater than 3/10 when performing household chores/yardwork.    Plan  Therapy options: will be seen for skilled physical therapy services  Planned modality interventions: cryotherapy, dry needling, TENS, electrical stimulation/Vincentian stimulation, ultrasound, traction and thermotherapy (hydrocollator packs)  Planned therapy interventions: manual therapy, ADL retraining, balance/weight-bearing training, neuromuscular re-education, body mechanics training, postural training, flexibility, spinal/joint mobilization, soft tissue mobilization, functional ROM exercises, strengthening, gait training, stretching, home exercise program, therapeutic activities, IADL retraining,  transfer training and joint mobilization  Duration in visits: 16  Duration in weeks: 8  Treatment plan discussed with: patient  Plan details: Moderate Evaluation  41194  Re-evaluation   12263    Therapeutic exercise  87431  Therapeutic activity    94274  Neuromuscular re-education   47305  Manual therapy   02698  Gait training  01994    Unattended e-stim (Medicaid/Medicare)     Moist heat/cryotherapy 00842   Ultrasound   41646  Mechanical traction 88952          Visit Diagnoses:    ICD-10-CM ICD-9-CM   1. Spinal stenosis, lumbar region with neurogenic claudication  M48.062 724.03   2. DDD (degenerative disc disease), lumbar  M51.36 722.52       Timed:  Manual Therapy:         mins  63120;  Therapeutic Exercise:         mins  55866;     Neuromuscular Natty:        mins  92010;    Therapeutic Activity:          mins  41689;     Gait Training:           mins  26427;     Ultrasound:          mins  05383;    Electrical Stimulation:         mins  47894 ( );    Untimed:  Electrical Stimulation:         mins  03342 ( );  Mechanical Traction:         mins  46527;     Timed Treatment:      mins   Total Treatment:     40   mins    PT SIGNATURE: Naomi Fink, PT   DATE TREATMENT INITIATED: 4/8/2021      Initial Certification  Certification Period: 7/7/2021  I certify that the therapy services are furnished while this patient is under my care.  The services outlined above are required by this patient, and will be reviewed every 90 days.     PHYSICIAN: Fabby Pñea PA-C      DATE:     Please sign and return via fax to [phone].. Thank you, [facility].

## 2021-04-14 ENCOUNTER — TREATMENT (OUTPATIENT)
Dept: PHYSICAL THERAPY | Facility: CLINIC | Age: 41
End: 2021-04-14

## 2021-04-14 DIAGNOSIS — M51.36 DDD (DEGENERATIVE DISC DISEASE), LUMBAR: ICD-10-CM

## 2021-04-14 DIAGNOSIS — M48.062 SPINAL STENOSIS, LUMBAR REGION WITH NEUROGENIC CLAUDICATION: Primary | ICD-10-CM

## 2021-04-14 PROCEDURE — 97140 MANUAL THERAPY 1/> REGIONS: CPT | Performed by: PHYSICAL THERAPIST

## 2021-04-14 PROCEDURE — 97014 ELECTRIC STIMULATION THERAPY: CPT | Performed by: PHYSICAL THERAPIST

## 2021-04-14 PROCEDURE — 97110 THERAPEUTIC EXERCISES: CPT | Performed by: PHYSICAL THERAPIST

## 2021-04-14 NOTE — PROGRESS NOTES
Patient: Wenceslao Nava   : 1980  Referring practitioner: Fabby Peña PA-C  Date of Initial Visit: Type: THERAPY  Noted: 2021  Today's Date: 2021  Patient seen for 2 sessions           Subjective Evaluation    History of Present Illness    Subjective comment: Pt reprots having 7/10 pain in the back and in to the right leg into foot.  Pt will receive a new MRI on the  of this month.       Objective   See Exercise, Manual, and Modality Logs for complete treatment.       Assessment & Plan     Assessment  Assessment details: Tx today consisted of there ex for improved core stability and centralization of pain; followed by stm to lumbar region in sidelying and estim and ice to the lower back.  Pt responded well to tx today with reports of decreased pain to 5/10 post tx.    Plan  Plan details: Will follow progressing lumbar stability and decreased pain.  Will cont to progress core stability.        Visit Diagnoses:    ICD-10-CM ICD-9-CM   1. Spinal stenosis, lumbar region with neurogenic claudication  M48.062 724.03   2. DDD (degenerative disc disease), lumbar  M51.36 722.52       Progress strengthening /stabilization /functional activity           Timed:  Manual Therapy:    12     mins  90773;  Therapeutic Exercise:    26     mins  47558;     Neuromuscular Natty:        mins  79895;    Therapeutic Activity:          mins  97420;     Gait Training:           mins  20036;     Ultrasound:          mins  16837;    Electrical Stimulation:         mins  63980 ( );    Untimed:  Electrical Stimulation:    10     mins  25392 ( );  Mechanical Traction:         mins  93028;     Timed Treatment:   38   mins   Total Treatment:     48   mins  Kris Weir PT  Physical Therapist

## 2021-04-16 ENCOUNTER — TREATMENT (OUTPATIENT)
Dept: PHYSICAL THERAPY | Facility: CLINIC | Age: 41
End: 2021-04-16

## 2021-04-16 DIAGNOSIS — M48.062 SPINAL STENOSIS, LUMBAR REGION WITH NEUROGENIC CLAUDICATION: Primary | ICD-10-CM

## 2021-04-16 DIAGNOSIS — M51.36 DDD (DEGENERATIVE DISC DISEASE), LUMBAR: ICD-10-CM

## 2021-04-16 PROCEDURE — 97110 THERAPEUTIC EXERCISES: CPT | Performed by: PHYSICAL THERAPIST

## 2021-04-16 PROCEDURE — 97140 MANUAL THERAPY 1/> REGIONS: CPT | Performed by: PHYSICAL THERAPIST

## 2021-04-16 PROCEDURE — 97014 ELECTRIC STIMULATION THERAPY: CPT | Performed by: PHYSICAL THERAPIST

## 2021-04-16 NOTE — PROGRESS NOTES
Physical Therapy Daily Treatment Note      Patient: Wenceslao Nava   : 1980  Referring practitioner: Fbaby Peña PA-C  Date of Initial Visit: Type: THERAPY  Noted: 2021  Today's Date: 2021  Patient seen for 3 sessions         Subjective:  Patient arrives to therapy w/ reports of 7/10 low back pain.  Pt states of no leg pain.  Pt reports he tolerated last session well w/ no complaints.      Objective   See Exercise, Manual, and Modality Logs for complete treatment.       Assessment/Plan:  Patient responded well to today's session w/ reports of decreased pain following, 4/10.  Treatment initiated w/ therex as listed f/b manual rx and conclusion of modalities.  Therex completed w/ focus on improved lumbar range of motion, improved lumbar stability and reduced leg pain.  Pt provided w/ cues and demonstration intermittently throughout tx for proper form and max benefit.  Manual rx performed including PA glides and STM to address tightness, and assist w/ improved mobility.  Therex progressed w/ strengthening reps increased as to pt's tolerance.  Pt continues to benefit from therapy services and will be progressed as tolerated.  Continue w/ PT's POC.     Visit Diagnoses:    ICD-10-CM ICD-9-CM   1. Spinal stenosis, lumbar region with neurogenic claudication  M48.062 724.03   2. DDD (degenerative disc disease), lumbar  M51.36 722.52       Progress per Plan of Care and Progress strengthening /stabilization /functional activity           Timed:  Manual Therapy:    12     mins  82649;  Therapeutic Exercise:     32    mins  34182;     Neuromuscular aNtty:        mins  94203;    Therapeutic Activity:          mins  54464;     Gait Training:           mins  76177;     Ultrasound:          mins  34843;    Electrical Stimulation:         mins  92539 ( );    Untimed:  Electrical Stimulation:    10     mins  21822 ( );  Mechanical Traction:         mins  13378;     Timed Treatment:  44    mins    Total Treatment:    54    mins  Jessika Allen. BRAULIO Cotto  Physical Therapist

## 2021-04-21 ENCOUNTER — TREATMENT (OUTPATIENT)
Dept: PHYSICAL THERAPY | Facility: CLINIC | Age: 41
End: 2021-04-21

## 2021-04-21 DIAGNOSIS — M51.36 DDD (DEGENERATIVE DISC DISEASE), LUMBAR: ICD-10-CM

## 2021-04-21 DIAGNOSIS — M48.062 SPINAL STENOSIS, LUMBAR REGION WITH NEUROGENIC CLAUDICATION: Primary | ICD-10-CM

## 2021-04-21 PROCEDURE — 97014 ELECTRIC STIMULATION THERAPY: CPT | Performed by: PHYSICAL THERAPIST

## 2021-04-21 PROCEDURE — 97140 MANUAL THERAPY 1/> REGIONS: CPT | Performed by: PHYSICAL THERAPIST

## 2021-04-21 PROCEDURE — 97110 THERAPEUTIC EXERCISES: CPT | Performed by: PHYSICAL THERAPIST

## 2021-04-21 NOTE — PROGRESS NOTES
Physical Therapy Daily Treatment Note      Patient: Wenceslao Nava   : 1980  Referring practitioner: Fabby Peña PA-C  Date of Initial Visit: Type: THERAPY  Noted: 2021  Today's Date: 2021  Patient seen for 4 sessions         Subjective Evaluation    Pain  Current pain ratin      :  Patient arrives to therapy w/ reports of increased low back pain.  Pt states he feels his leg pain has improved some, however he reports continued back pain.      Objective   See Exercise, Manual, and Modality Logs for complete treatment.       Assessment/Plan:  Patient tolerated treatment well today w/ reports of slight decrease in pain following, 5/10.  Pt completed therex as listed f/b manual rx and conclusion of cryotherapy w/ Estim.  Therex progressed w/ resistance of tband increased from red to green w/ good tolerance observed.  Pt continues to require cues and demonstration for proper form and mechanics.  Manual STM along w/ PA glides performed to lumbar region to address tightness and assist w/ improved mobility.  Pt in prone position.  Pt continues to benefit from therapy services and will be progressed as tolerated.  Continue w/ PT's POC.      Visit Diagnoses:    ICD-10-CM ICD-9-CM   1. Spinal stenosis, lumbar region with neurogenic claudication  M48.062 724.03   2. DDD (degenerative disc disease), lumbar  M51.36 722.52       Progress per Plan of Care and Progress strengthening /stabilization /functional activity           Timed:  Manual Therapy:   10      mins  75786;  Therapeutic Exercise:   36      mins  77737;     Neuromuscular Natty:        mins  22271;    Therapeutic Activity:          mins  11401;     Gait Training:           mins  84610;     Ultrasound:          mins  76878;    Electrical Stimulation:         mins  35019 ( );    Untimed:  Electrical Stimulation:   10      mins  07317 ( );  Mechanical Traction:         mins  16102;     Timed Treatment:  46    mins   Total  Treatment:    56    mins  Jessika Allen. BRAULIO Cotto  Physical Therapist

## 2021-04-23 ENCOUNTER — TREATMENT (OUTPATIENT)
Dept: PHYSICAL THERAPY | Facility: CLINIC | Age: 41
End: 2021-04-23

## 2021-04-23 DIAGNOSIS — M51.36 DDD (DEGENERATIVE DISC DISEASE), LUMBAR: ICD-10-CM

## 2021-04-23 DIAGNOSIS — M48.062 SPINAL STENOSIS, LUMBAR REGION WITH NEUROGENIC CLAUDICATION: Primary | ICD-10-CM

## 2021-04-23 PROCEDURE — 97110 THERAPEUTIC EXERCISES: CPT | Performed by: PHYSICAL THERAPIST

## 2021-04-23 PROCEDURE — 97140 MANUAL THERAPY 1/> REGIONS: CPT | Performed by: PHYSICAL THERAPIST

## 2021-04-23 PROCEDURE — 97014 ELECTRIC STIMULATION THERAPY: CPT | Performed by: PHYSICAL THERAPIST

## 2021-04-23 NOTE — PROGRESS NOTES
Physical Therapy Daily Treatment Note      Patient: Wenceslao Nava   : 1980  Referring practitioner: aFbby Peña PA-C  Date of Initial Visit: Type: THERAPY  Noted: 2021  Today's Date: 2021  Patient seen for 5 sessions         Subjective:  Patient reports /10 low back and right leg pain upon arrival to therapy.  Pt states he noticed the numbness and tingling going into his right leg yesterday, while pushing the gas pedal.      Objective   See Exercise, Manual, and Modality Logs for complete treatment.       Assessment/Plan:  Patient completed today's session w/ reports of slight decrease in pain following, 5/10.  Pt performed therex as listed f/b manual rx and conclusion of cryotherapy w/ Estim.  Therex progressed w/ additional tband back strengthening activities added to program. Pt observed w/ good tolerance, and was provided w/ cues and demonstration for proper mechanics.  Manual rx performed w/ pt in prone position including PA glides and STM.  Pt educated on importance of performing home program for maximum gains.  Pt verbalized understanding.  Pt continues to benefit from therapy services and will be progressed as tolerated.  Continue w/ PT's POC.     Visit Diagnoses:    ICD-10-CM ICD-9-CM   1. Spinal stenosis, lumbar region with neurogenic claudication  M48.062 724.03   2. DDD (degenerative disc disease), lumbar  M51.36 722.52       Progress per Plan of Care and Progress strengthening /stabilization /functional activity           Timed:  Manual Therapy:    12    mins  88730;  Therapeutic Exercise:    36     mins  70199;     Neuromuscular Natty:        mins  14657;    Therapeutic Activity:          mins  76810;     Gait Training:           mins  49825;     Ultrasound:          mins  99667;    Electrical Stimulation:         mins  78932 ( );    Untimed:  Electrical Stimulation:   10      mins  69598 ( );  Mechanical Traction:         mins  63650;     Timed Treatment:  48     mins   Total Treatment:   58     mins  Jessika Allen. Kirti, BRAULIO  Physical Therapist

## 2021-04-26 ENCOUNTER — HOSPITAL ENCOUNTER (OUTPATIENT)
Dept: GENERAL RADIOLOGY | Facility: HOSPITAL | Age: 41
Discharge: HOME | End: 2021-04-26

## 2021-04-26 ENCOUNTER — HOSPITAL ENCOUNTER (OUTPATIENT)
Dept: MRI IMAGING | Facility: HOSPITAL | Age: 41
Discharge: HOME | End: 2021-04-26

## 2021-04-26 DIAGNOSIS — M48.062 SPINAL STENOSIS, LUMBAR REGION, WITH NEUROGENIC CLAUDICATION: ICD-10-CM

## 2021-04-26 DIAGNOSIS — M51.36 DDD (DEGENERATIVE DISC DISEASE), LUMBAR: ICD-10-CM

## 2021-04-26 PROCEDURE — 72110 X-RAY EXAM L-2 SPINE 4/>VWS: CPT

## 2021-04-26 PROCEDURE — 72148 MRI LUMBAR SPINE W/O DYE: CPT

## 2021-04-26 PROCEDURE — 72110 X-RAY EXAM L-2 SPINE 4/>VWS: CPT | Performed by: RADIOLOGY

## 2021-04-26 PROCEDURE — 72148 MRI LUMBAR SPINE W/O DYE: CPT | Performed by: RADIOLOGY

## 2021-04-27 ENCOUNTER — OFFICE VISIT (OUTPATIENT)
Dept: NEUROSURGERY | Facility: CLINIC | Age: 41
End: 2021-04-27

## 2021-04-27 VITALS — BODY MASS INDEX: 34.4 KG/M2 | HEIGHT: 72 IN | WEIGHT: 254 LBS

## 2021-04-27 DIAGNOSIS — G89.29 CHRONIC BILATERAL LOW BACK PAIN WITH BILATERAL SCIATICA: ICD-10-CM

## 2021-04-27 DIAGNOSIS — M51.36 DDD (DEGENERATIVE DISC DISEASE), LUMBAR: ICD-10-CM

## 2021-04-27 DIAGNOSIS — M54.42 CHRONIC BILATERAL LOW BACK PAIN WITH BILATERAL SCIATICA: ICD-10-CM

## 2021-04-27 DIAGNOSIS — E66.01 MORBID (SEVERE) OBESITY DUE TO EXCESS CALORIES (HCC): ICD-10-CM

## 2021-04-27 DIAGNOSIS — M19.90 ARTHRITIS: ICD-10-CM

## 2021-04-27 DIAGNOSIS — M48.062 SPINAL STENOSIS, LUMBAR REGION, WITH NEUROGENIC CLAUDICATION: ICD-10-CM

## 2021-04-27 DIAGNOSIS — R53.81 PHYSICAL DECONDITIONING: Primary | ICD-10-CM

## 2021-04-27 DIAGNOSIS — M54.41 CHRONIC BILATERAL LOW BACK PAIN WITH BILATERAL SCIATICA: ICD-10-CM

## 2021-04-27 PROCEDURE — 99214 OFFICE O/P EST MOD 30 MIN: CPT | Performed by: PHYSICIAN ASSISTANT

## 2021-04-27 NOTE — PROGRESS NOTES
Wenceslao Nava  1980 04/27/2021  [id number]    CC: Back and bilateral leg pain, right> left    HPI:  This is a pleasant 41-year-old gentleman from [place], [place] who in 2019 developed a herniated disc in his cervical spine after lifting heavy metal at work, he had an ACDF by the [place] [facility].  He has also had the onset of back pain since that time and has attended physical therapy which helps temporarily, he has had injections as well as rhizotomies by [facility] pain and spine in [place] with out sustained relief and he continues to struggle.  The patient reports his right leg pain is worse than the left, he gets shooting pains down the leg to the ankle and he can get numbness in the entire leg but worse in the anterior leg sometimes the numbness goes down to his toes.  He has been struggling with this for many months now and was sent for neurosurgical evaluation.  I have asked him to obtain a new MRI scan of the lumbar spine as well as flexion-extension x-rays and we have those to review today.    Chronic illnesses:  Diabetes  Arthritis  Hypertension  Anxiety and depression  Hyperlipidemia  Past Medical History:   Diagnosis Date   • Arthritis    • Hypertension    • Knee pain, left        No Known Allergies      Current Outpatient Medications:   •  aspirin (aspirin) 81 MG EC tablet, Take 1 tablet by mouth Daily., Disp: 30 tablet, Rfl: 0  •  atorvastatin (LIPITOR) 20 MG tablet, Take 20 mg by mouth Daily., Disp: , Rfl:   •  Dulaglutide (Trulicity) 0.75 MG/0.5ML solution pen-injector, Trulicity 0.75 mg/0.5 mL subcutaneous pen injector, Disp: , Rfl:   •  FLUoxetine (PROzac) 20 MG capsule, Take 20 mg by mouth Daily., Disp: , Rfl:   •  gabapentin (NEURONTIN) 400 MG capsule, Take 400 mg by mouth 3 (Three) Times a Day., Disp: , Rfl:   •  HYDROcodone-acetaminophen (NORCO) 7.5-325 MG per tablet, Take 1 tablet by mouth Every 6 (Six) Hours As Needed for Moderate Pain ., Disp: , Rfl:   •  hydrOXYzine  (ATARAX) 25 MG tablet, Take 25 mg by mouth 3 (Three) Times a Day As Needed for Itching., Disp: , Rfl:   •  lisinopril-hydrochlorothiazide (PRINZIDE,ZESTORETIC) 10-12.5 MG per tablet, Take 1 tablet by mouth Daily., Disp: 15 tablet, Rfl: 0  •  losartan (COZAAR) 25 MG tablet, Take 25 mg by mouth Daily., Disp: , Rfl:   •  meloxicam (MOBIC) 15 MG tablet, Take 15 mg by mouth Daily., Disp: , Rfl:   •  metFORMIN (GLUCOPHAGE) 1000 MG tablet, Take 1,000 mg by mouth 2 (Two) Times a Day With Meals., Disp: , Rfl:   •  nabumetone (RELAFEN) 500 MG tablet, Take 500 mg by mouth 2 (Two) Times a Day As Needed for Mild Pain ., Disp: , Rfl:     Review of Systems   Constitutional: Negative for activity change, appetite change, chills, diaphoresis, fatigue, fever and unexpected weight change.   HENT: Negative for congestion, dental problem, drooling, ear discharge, ear pain, facial swelling, hearing loss, mouth sores, nosebleeds, postnasal drip, rhinorrhea, sinus pressure, sinus pain, sneezing, sore throat, tinnitus, trouble swallowing and voice change.    Eyes: Negative for photophobia, pain, discharge, redness, itching and visual disturbance.   Respiratory: Negative for apnea, cough, choking, chest tightness, shortness of breath, wheezing and stridor.    Cardiovascular: Negative for chest pain, palpitations and leg swelling.   Gastrointestinal: Negative for abdominal distention, abdominal pain, anal bleeding, blood in stool, constipation, diarrhea, nausea, rectal pain and vomiting.   Endocrine: Negative for cold intolerance, heat intolerance, polydipsia, polyphagia and polyuria.   Genitourinary: Negative for decreased urine volume, difficulty urinating, dysuria, enuresis, flank pain, frequency, genital sores, hematuria and urgency.   Musculoskeletal: Positive for back pain. Negative for arthralgias, gait problem, joint swelling, myalgias, neck pain and neck stiffness.   Skin: Negative for color change, pallor, rash and wound.  "  Allergic/Immunologic: Negative for environmental allergies, food allergies and immunocompromised state.   Neurological: Negative for dizziness, tremors, seizures, syncope, facial asymmetry, speech difficulty, weakness, light-headedness, numbness and headaches.   Hematological: Negative for adenopathy. Does not bruise/bleed easily.   Psychiatric/Behavioral: Negative for agitation, behavioral problems, confusion, decreased concentration, dysphoric mood, hallucinations, self-injury, sleep disturbance and suicidal ideas. The patient is not nervous/anxious and is not hyperactive.         Tobacco Use: High Risk   • Smoking Tobacco Use: Never Smoker   • Smokeless Tobacco Use: Current User   He uses dip.    Body mass index is 34.45 kg/m².  Weight 254 pounds    Radiological data:  I have reviewed the MRI scan and flexion-extension x-rays and discussed with the patient.  The MRI scan shows significant disc degeneration at L2-3 and L3-4, there is broad-based disc bulging and moderate bilateral foraminal narrowing at L2-3 and at L3-4 in addition to a right disc protrusion with significant foraminal narrowing and nerve root compression.  There is no movement on flexion-extension views.    PE:  Ht 182.9 cm (72\")   Wt 115 kg (254 lb)   BMI 34.45 kg/m²     He sounds well on the phone, no cough, shortness of breath or wheezing.    Neurologic Exam   He continues with sensory alteration in the right anterior thigh, no weakness in the legs, no further difficulties with his gait since last visit.  Reflexes were intact in the lower extremities.    MDM   1.  Lumbar degenerative disc disease L2-3 and and L3-4  2.  Herniated nucleus pulposus L3-4, right  3.  I am making a referral for the patient to see Dr. Sorto in the [facility] to discuss possible surgical intervention.  The patient has failed conservative treatment with pain management, physical therapy and multiple injections.  The patient is in agreement with this plan to " meet with Dr. Sorto and discuss further options.    Danita Peña, PAC

## 2021-04-28 ENCOUNTER — TREATMENT (OUTPATIENT)
Dept: PHYSICAL THERAPY | Facility: CLINIC | Age: 41
End: 2021-04-28

## 2021-04-28 DIAGNOSIS — M48.062 SPINAL STENOSIS, LUMBAR REGION WITH NEUROGENIC CLAUDICATION: Primary | ICD-10-CM

## 2021-04-28 DIAGNOSIS — M51.36 DDD (DEGENERATIVE DISC DISEASE), LUMBAR: ICD-10-CM

## 2021-04-28 PROCEDURE — 97140 MANUAL THERAPY 1/> REGIONS: CPT | Performed by: PHYSICAL THERAPIST

## 2021-04-28 PROCEDURE — 97014 ELECTRIC STIMULATION THERAPY: CPT | Performed by: PHYSICAL THERAPIST

## 2021-04-28 PROCEDURE — 97110 THERAPEUTIC EXERCISES: CPT | Performed by: PHYSICAL THERAPIST

## 2021-04-28 NOTE — PROGRESS NOTES
Physical Therapy Daily Treatment Note      Patient: Wenceslao Nava   : 1980  Referring practitioner: Fabby Peña PA-C  Date of Initial Visit: Type: THERAPY  Noted: 2021  Today's Date: 2021  Patient seen for 6 sessions         Subjective:  Patient reports 7/10 low back pain prior to tx.  Pt states he is scheduled to see a neurologist in 2 weeks to discuss matters regarding findings on his MRI.  Pt states he usually feels better following therapy, however notes the pain returns.      Objective   See Exercise, Manual, and Modality Logs for complete treatment.       Assessment/Plan:  Patient completed today's session w/ reports of slight decrease in pain following, 5/10.  Pt performed therex as listed w/ focus on extension activities and improved postural strength f/b manual rx and conclusion of modalities.  Pt continues to require intermittent cues while completing exercise for good form and mechanics.  Pt continues to benefit from therapy services and will be progressed as tolerated.  Continue w/ PT's POC.     Visit Diagnoses:    ICD-10-CM ICD-9-CM   1. Spinal stenosis, lumbar region with neurogenic claudication  M48.062 724.03   2. DDD (degenerative disc disease), lumbar  M51.36 722.52       Progress per Plan of Care and Progress strengthening /stabilization /functional activity           Timed:  Manual Therapy:    12     mins  33118;  Therapeutic Exercise:    34     mins  23137;     Neuromuscular Natty:        mins  03035;    Therapeutic Activity:          mins  29302;     Gait Training:           mins  68147;     Ultrasound:          mins  80943;    Electrical Stimulation:         mins  29186 ( );    Untimed:  Electrical Stimulation:   10      mins  70053 ( );  Mechanical Traction:         mins  44354;     Timed Treatment:  46    mins   Total Treatment:    56    mins  Jessika Allen. BRAULIO Cotto  Physical Therapist

## 2021-05-05 ENCOUNTER — TREATMENT (OUTPATIENT)
Dept: PHYSICAL THERAPY | Facility: CLINIC | Age: 41
End: 2021-05-05

## 2021-05-05 DIAGNOSIS — M48.062 SPINAL STENOSIS, LUMBAR REGION WITH NEUROGENIC CLAUDICATION: Primary | ICD-10-CM

## 2021-05-05 DIAGNOSIS — M51.36 DDD (DEGENERATIVE DISC DISEASE), LUMBAR: ICD-10-CM

## 2021-05-05 PROCEDURE — 97110 THERAPEUTIC EXERCISES: CPT | Performed by: PHYSICAL THERAPIST

## 2021-05-05 PROCEDURE — 97014 ELECTRIC STIMULATION THERAPY: CPT | Performed by: PHYSICAL THERAPIST

## 2021-05-05 PROCEDURE — 97140 MANUAL THERAPY 1/> REGIONS: CPT | Performed by: PHYSICAL THERAPIST

## 2021-05-05 NOTE — PROGRESS NOTES
Physical Therapy Daily Treatment Note      Patient: Wenceslao Nava   : 1980  Referring practitioner: Fabby Peña PA-C  Date of Initial Visit: Type: THERAPY  Noted: 2021  Today's Date: 2021  Patient seen for 7 sessions         Subjective:  Patient states his back is really bothering him this morning, w/ reports of 7/10 pain prior to tx.     Objective   See Exercise, Manual, and Modality Logs for complete treatment.       Assessment/Plan:  Patient tolerated treatment well today w/ decreased pain noted following, 5/10.  Pt completed therex as listed w/ additional LE strengthening exercises added to program to address deficits.  Pt provided w/ cues and demonstration for form and mechanics.  Pt continues to perform program w/ focus on reduced leg pain, improved lumbar/ core stability, and postural awareness.  Pt observed w/ good tolerance to manual rx w/ decreased pain.  Crotherapy w/ Estim applied at conclusion.  Pt continues to benefit from therapy services and will be progressed as tolerated.  Continue w/ PT's POC.       Visit Diagnoses:    ICD-10-CM ICD-9-CM   1. Spinal stenosis, lumbar region with neurogenic claudication  M48.062 724.03   2. DDD (degenerative disc disease), lumbar  M51.36 722.52       Progress per Plan of Care and Progress strengthening /stabilization /functional activity           Timed:  Manual Therapy:    15     mins  44486;  Therapeutic Exercise:     29    mins  36796;     Neuromuscular Natty:        mins  95935;    Therapeutic Activity:          mins  18436;     Gait Training:           mins  27503;     Ultrasound:          mins  66357;    Electrical Stimulation:         mins  96795 ( );    Untimed:  Electrical Stimulation:    10     mins  60290 ( );  Mechanical Traction:         mins  12343;     Timed Treatment: 44    mins   Total Treatment:    54    mins  Jessika Allen. BRAULIO Cotto  Physical Therapist

## 2021-05-07 ENCOUNTER — TREATMENT (OUTPATIENT)
Dept: PHYSICAL THERAPY | Facility: CLINIC | Age: 41
End: 2021-05-07

## 2021-05-07 DIAGNOSIS — M51.36 DDD (DEGENERATIVE DISC DISEASE), LUMBAR: ICD-10-CM

## 2021-05-07 DIAGNOSIS — M48.062 SPINAL STENOSIS, LUMBAR REGION WITH NEUROGENIC CLAUDICATION: Primary | ICD-10-CM

## 2021-05-07 PROCEDURE — 97032 APPL MODALITY 1+ESTIM EA 15: CPT | Performed by: PHYSICAL THERAPIST

## 2021-05-07 PROCEDURE — 97140 MANUAL THERAPY 1/> REGIONS: CPT | Performed by: PHYSICAL THERAPIST

## 2021-05-07 PROCEDURE — 97110 THERAPEUTIC EXERCISES: CPT | Performed by: PHYSICAL THERAPIST

## 2021-05-07 NOTE — PROGRESS NOTES
Discharge  Patient: Wenceslao Nava   : 1980  Referring practitioner: Fabby Peña PA-C  Date of Initial Visit: Type: THERAPY  Noted: 2021  Today's Date: 2021  Patient seen for 8 sessions           Subjective Evaluation    History of Present Illness    Subjective comment: Pt reports he would like to cont with her HEP and follow up with his MD at this time.Pain  Current pain ratin  At best pain ratin  At worst pain ratin  Location: back and right leg into toes           Objective   See Exercise, Manual, and Modality Logs for complete treatment.       Assessment & Plan     Assessment  Assessment details: Patient is a 40 year old male who comes to physical therapy for DDD and spinal stenosis of the lumbar spine. Pt has been instructed in a HEP and has requested to cont with his HEP at this time and follow up with his MD.  Pt reports having continued pain that extends into the right leg.  Pt responded well to tx today with 5/10 post pain.  Prognosis: good    Goals  Plan Goals: SHORT TERM GOALS:     4 weeks  1. Patient will be independent/compliant with HEP.met  2. Patient will report pain no greater than 5/10 when performing self-care activities.not met  3. Patient will report pain no greater than 5/10 when sitting to travel community distances.not met  4. Pt to report 25% improvement of pain/numbness/tingling into the bilateral leg to show decreased nerve compression.not met      LONG TERM GOALS:   8 weeks  1. Patient will show a 25% improvement of lumbar AROM to show improved ability to perform functional activities.met  2. bilateral LE strength will improve to at least 4/5 to allow for greater ease with daily tasks.met  3. Patient to report less than 50% impairment on the Modified Oswestry for improved functional mobility.not met  4. Pt to report 50% improvement of pain/numbness/tingling into the bilateral leg to show decreased nerve compression.not met  5. Patient will  report pain no greater than 3/10 when performing household chores/yardwork.not met    Plan  Therapy options: will be seen for skilled physical therapy services  Planned modality interventions: cryotherapy, dry needling, TENS, electrical stimulation/S[place]i stimulation, ultrasound, traction and thermotherapy (hydrocollator packs)  Treatment plan discussed with: patient        Visit Diagnoses:    ICD-10-CM ICD-9-CM   1. Spinal stenosis, lumbar region with neurogenic claudication  M48.062 724.03   2. DDD (degenerative disc disease), lumbar  M51.36 722.52       Other           Timed:  Manual Therapy:    15     mins  80117;  Therapeutic Exercise:    31     mins  88529;     Neuromuscular Natty:        mins  73874;    Therapeutic Activity:          mins  37917;     Gait Training:           mins  34758;     Ultrasound:          mins  00168;    Electrical Stimulation:         mins  73736 ( );    Untimed:  Electrical Stimulation:    10     mins  32420 ( );  Mechanical Traction:         mins  17237;     Timed Treatment:   46   mins   Total Treatment:     56   mins  Kris Weir, PT  Physical Therapist

## 2021-05-27 ENCOUNTER — TELEPHONE (OUTPATIENT)
Dept: NEUROSURGERY | Facility: CLINIC | Age: 41
End: 2021-05-27

## 2021-05-27 NOTE — TELEPHONE ENCOUNTER
Spoke to patient. Appointment scheduled. Per Danita's discharge instructions, get recent lab results and chest x-rays or EKG results from PCP and scan into chart. Per patient, he's had those done in the past. I will contact his PCP and request the records.

## 2021-05-27 NOTE — TELEPHONE ENCOUNTER
Caller: SHARAD WOLF    Relationship:  SERVICES    Best call back number: [phone]    What form or medical record are you requesting: MEDICAL RECORDS    Who is requesting this form or medical record from you: DISABILITY    How would you like to receive the form or medical records (pick-up, mail, fax): FAX  If fax, what is the fax number: [phone]  If mail, what is the address:   If pick-up, provide patient with address and location details    Timeframe paperwork needed: ASAP    Additional notes: REPRESENTATIVE STATES THEY RECEIVED A LETTER STATING THEY COULD REQUEST RECORDS THROUGH EMAIL. ADVISED THAT RECORDS COULD BE FAXED UPON REQUEST.    SPENCER IN SYSTEM AND PT HAS AN UPCOMING COURT DATE.    PLEASE  REGARDING THIS MATTER, QUESTIONS OR CONCERNS.    THANK YOU!

## 2021-05-27 NOTE — TELEPHONE ENCOUNTER
Pt called to check status of referral to Dr. Sorto at the [facility]. I do not see the referral in his chart.     Per last office note on 4/27/21 Danita was making a referral for the patient to see Dr. Sorto in the [facility] to discuss possible surgical intervention. Please advise!      Pt contact: [phone]  Best time to call: anytime

## 2021-06-09 ENCOUNTER — OFFICE VISIT (OUTPATIENT)
Dept: NEUROSURGERY | Facility: CLINIC | Age: 41
End: 2021-06-09

## 2021-06-09 ENCOUNTER — PREP FOR SURGERY (OUTPATIENT)
Dept: OTHER | Facility: HOSPITAL | Age: 41
End: 2021-06-09

## 2021-06-09 VITALS — WEIGHT: 260 LBS | HEIGHT: 72 IN | BODY MASS INDEX: 35.21 KG/M2 | TEMPERATURE: 97.8 F

## 2021-06-09 DIAGNOSIS — M51.26 HNP (HERNIATED NUCLEUS PULPOSUS), LUMBAR: Primary | ICD-10-CM

## 2021-06-09 DIAGNOSIS — M51.16 LUMBAR DISC HERNIATION WITH RADICULOPATHY: Primary | ICD-10-CM

## 2021-06-09 DIAGNOSIS — M51.36 DDD (DEGENERATIVE DISC DISEASE), LUMBAR: ICD-10-CM

## 2021-06-09 PROCEDURE — 99213 OFFICE O/P EST LOW 20 MIN: CPT | Performed by: NEUROLOGICAL SURGERY

## 2021-06-09 RX ORDER — CEFAZOLIN SODIUM 2 G/100ML
2 INJECTION, SOLUTION INTRAVENOUS ONCE
Status: CANCELLED | OUTPATIENT
Start: 2021-06-09 | End: 2021-06-09

## 2021-06-09 RX ORDER — FAMOTIDINE 20 MG/1
20 TABLET, FILM COATED ORAL
Status: CANCELLED | OUTPATIENT
Start: 2021-06-09

## 2021-06-09 NOTE — H&P
Patient: Wenceslao Nava  : 1980     Primary Care Provider: Flores hCua PA     Requesting Provider: As above           History     Chief Complaint: Low back and right lower extremity pain with sensory alteration.     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  Most the pain extends into the shin toward the foot.  He has some modest symptoms on the left.  He has undergone extensive treatment including therapy, medicines, physical therapy.  All of this was to no avail.  He does have some numbness extending into his leg.  He is struggling and is unable to get comfortable.     Review of Systems   Constitutional: Negative for activity change, appetite change, chills, diaphoresis, fatigue, fever and unexpected weight change.   HENT: Negative for congestion, dental problem, drooling, ear discharge, ear pain, facial swelling, hearing loss, mouth sores, nosebleeds, postnasal drip, rhinorrhea, sinus pressure, sinus pain, sneezing, sore throat, tinnitus, trouble swallowing and voice change.    Eyes: Negative for photophobia, pain, discharge, redness, itching and visual disturbance.   Respiratory: Negative for apnea, cough, choking, chest tightness, shortness of breath, wheezing and stridor.    Cardiovascular: Negative for chest pain, palpitations and leg swelling.   Gastrointestinal: Negative for abdominal distention, abdominal pain, anal bleeding, blood in stool, constipation, diarrhea, nausea, rectal pain and vomiting.   Endocrine: Negative for cold intolerance, heat intolerance, polydipsia, polyphagia and polyuria.   Genitourinary: Negative for decreased urine volume, difficulty urinating, dysuria, enuresis, flank pain, frequency, genital sores, hematuria and urgency.   Musculoskeletal: Positive for back pain. Negative for arthralgias, gait problem, joint swelling,  myalgias, neck pain and neck stiffness.   Skin: Negative for color change, pallor, rash and wound.   Allergic/Immunologic: Negative for environmental allergies, food allergies and immunocompromised state.   Neurological: Negative for dizziness, tremors, seizures, syncope, facial asymmetry, speech difficulty, weakness, light-headedness, numbness and headaches.   Hematological: Negative for adenopathy. Does not bruise/bleed easily.   Psychiatric/Behavioral: Negative for agitation, behavioral problems, confusion, decreased concentration, dysphoric mood, hallucinations, self-injury, sleep disturbance and suicidal ideas. The patient is not nervous/anxious and is not hyperactive.          The patient's past medical history, past surgical history, family history, and social history have been reviewed at length in the electronic medical record.     Past Medical History:   Diagnosis Date   • Arthritis    • Hypertension    • Knee pain, left      Past Surgical History:   Procedure Laterality Date   • CHOLECYSTECTOMY     • NECK SURGERY      May 2019: Believes it to be an ACDF, RAVINDRA Jackman     Family History   Problem Relation Age of Onset   • Diabetes Brother    • Hypertension Brother    • Diabetes Mother      Social History     Socioeconomic History   • Marital status: Single     Spouse name: Not on file   • Number of children: Not on file   • Years of education: Not on file   • Highest education level: Not on file   Tobacco Use   • Smoking status: Never Smoker   • Smokeless tobacco: Current User     Types: Chew   Vaping Use   • Vaping Use: Never used   Substance and Sexual Activity   • Alcohol use: Not Currently     Comment: currently trying to quit 06/09/2021   • Drug use: No   • Sexual activity: Defer       No Known Allergies    Current Outpatient Medications on File Prior to Visit   Medication Sig Dispense Refill   • aspirin (aspirin) 81 MG EC tablet Take 1 tablet by mouth Daily. 30 tablet 0   • atorvastatin  "(LIPITOR) 20 MG tablet Take 20 mg by mouth Daily.     • Dulaglutide (Trulicity) 0.75 MG/0.5ML solution pen-injector Trulicity 0.75 mg/0.5 mL subcutaneous pen injector     • FLUoxetine (PROzac) 20 MG capsule Take 20 mg by mouth Daily.     • gabapentin (NEURONTIN) 400 MG capsule Take 400 mg by mouth 3 (Three) Times a Day.     • HYDROcodone-acetaminophen (NORCO) 7.5-325 MG per tablet Take 1 tablet by mouth 3 (Three) Times a Day.     • hydrOXYzine (ATARAX) 25 MG tablet Take 25 mg by mouth 3 (Three) Times a Day As Needed for Itching.     • lisinopril-hydrochlorothiazide (PRINZIDE,ZESTORETIC) 10-12.5 MG per tablet Take 1 tablet by mouth Daily. 15 tablet 0   • losartan (COZAAR) 25 MG tablet Take 25 mg by mouth Daily.     • meloxicam (MOBIC) 15 MG tablet Take 15 mg by mouth Daily.     • metFORMIN (GLUCOPHAGE) 1000 MG tablet Take 1,000 mg by mouth 2 (Two) Times a Day With Meals.     • nabumetone (RELAFEN) 500 MG tablet Take 500 mg by mouth 2 (Two) Times a Day As Needed for Mild Pain .       No current facility-administered medications on file prior to visit.        Physical Exam:   Temp 97.8 °F (36.6 °C)   Ht 182.9 cm (72.01\")   Wt 118 kg (260 lb)   BMI 35.25 kg/m²   MUSCULOSKELETAL:  Straight leg raising is negative.  Evan's Sign is negative.  ROM in the low back is normal.  Tenderness in the back to palpation is not observed.  NEUROLOGICAL:  Strength is intact in the lower extremities to direct testing.  When on his feet he has a little bit of trouble dorsiflexing the right foot but strength is overall intact with direct testing while sitting.  Muscle tone is normal throughout.  Station and gait are normal.  Sensation is intact to light touch testing throughout.  Deep tendon reflexes are difficult to elicit throughout.  Coordination is intact.        Medical Decision Making     Data Review:   (All imaging studies were personally reviewed unless stated otherwise)  Flexion-extension films demonstrate spondylosis but no " overt instability.     His lumbar MRI study shows some degenerative disc disease and facet arthropathy.  There is some disc-osteophyte in the recess and even proximal foramen on the right at L3-4 that could compromise the L4 nerve root.     Electrodiagnostic studies from 2019 raise the specter of a right L5/S1 radiculopathy.     Diagnosis:   Lumbar radiculopathy.     Treatment Options:   The patient is struggling with symptoms and as such I have recommended right L3-4 laminotomy, foraminotomy, and possible discectomy.  Given the chronicity of his symptoms surgery may not be as effective as we would like in improving his symptoms.  The nature of the procedure as well as the potential risks, complications, limitations, and alternatives to the procedure were discussed at length with the patient and the patient has agreed to proceed with surgery.          Diagnosis Plan   1. Lumbar disc herniation with radiculopathy      2. DDD (degenerative disc disease), lumbar

## 2021-06-09 NOTE — H&P (VIEW-ONLY)
Patient: Wenceslao Nava  : 1980     Primary Care Provider: Flores Chua PA     Requesting Provider: As above           History     Chief Complaint: Low back and right lower extremity pain with sensory alteration.     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  Most the pain extends into the shin toward the foot.  He has some modest symptoms on the left.  He has undergone extensive treatment including therapy, medicines, physical therapy.  All of this was to no avail.  He does have some numbness extending into his leg.  He is struggling and is unable to get comfortable.     Review of Systems   Constitutional: Negative for activity change, appetite change, chills, diaphoresis, fatigue, fever and unexpected weight change.   HENT: Negative for congestion, dental problem, drooling, ear discharge, ear pain, facial swelling, hearing loss, mouth sores, nosebleeds, postnasal drip, rhinorrhea, sinus pressure, sinus pain, sneezing, sore throat, tinnitus, trouble swallowing and voice change.    Eyes: Negative for photophobia, pain, discharge, redness, itching and visual disturbance.   Respiratory: Negative for apnea, cough, choking, chest tightness, shortness of breath, wheezing and stridor.    Cardiovascular: Negative for chest pain, palpitations and leg swelling.   Gastrointestinal: Negative for abdominal distention, abdominal pain, anal bleeding, blood in stool, constipation, diarrhea, nausea, rectal pain and vomiting.   Endocrine: Negative for cold intolerance, heat intolerance, polydipsia, polyphagia and polyuria.   Genitourinary: Negative for decreased urine volume, difficulty urinating, dysuria, enuresis, flank pain, frequency, genital sores, hematuria and urgency.   Musculoskeletal: Positive for back pain. Negative for arthralgias, gait problem, joint swelling,  myalgias, neck pain and neck stiffness.   Skin: Negative for color change, pallor, rash and wound.   Allergic/Immunologic: Negative for environmental allergies, food allergies and immunocompromised state.   Neurological: Negative for dizziness, tremors, seizures, syncope, facial asymmetry, speech difficulty, weakness, light-headedness, numbness and headaches.   Hematological: Negative for adenopathy. Does not bruise/bleed easily.   Psychiatric/Behavioral: Negative for agitation, behavioral problems, confusion, decreased concentration, dysphoric mood, hallucinations, self-injury, sleep disturbance and suicidal ideas. The patient is not nervous/anxious and is not hyperactive.          The patient's past medical history, past surgical history, family history, and social history have been reviewed at length in the electronic medical record.     Past Medical History:   Diagnosis Date   • Arthritis    • Hypertension    • Knee pain, left      Past Surgical History:   Procedure Laterality Date   • CHOLECYSTECTOMY     • NECK SURGERY      May 2019: Believes it to be an ACDF, RAVINDRA Jackman     Family History   Problem Relation Age of Onset   • Diabetes Brother    • Hypertension Brother    • Diabetes Mother      Social History     Socioeconomic History   • Marital status: Single     Spouse name: Not on file   • Number of children: Not on file   • Years of education: Not on file   • Highest education level: Not on file   Tobacco Use   • Smoking status: Never Smoker   • Smokeless tobacco: Current User     Types: Chew   Vaping Use   • Vaping Use: Never used   Substance and Sexual Activity   • Alcohol use: Not Currently     Comment: currently trying to quit 06/09/2021   • Drug use: No   • Sexual activity: Defer       No Known Allergies    Current Outpatient Medications on File Prior to Visit   Medication Sig Dispense Refill   • aspirin (aspirin) 81 MG EC tablet Take 1 tablet by mouth Daily. 30 tablet 0   • atorvastatin  "(LIPITOR) 20 MG tablet Take 20 mg by mouth Daily.     • Dulaglutide (Trulicity) 0.75 MG/0.5ML solution pen-injector Trulicity 0.75 mg/0.5 mL subcutaneous pen injector     • FLUoxetine (PROzac) 20 MG capsule Take 20 mg by mouth Daily.     • gabapentin (NEURONTIN) 400 MG capsule Take 400 mg by mouth 3 (Three) Times a Day.     • HYDROcodone-acetaminophen (NORCO) 7.5-325 MG per tablet Take 1 tablet by mouth 3 (Three) Times a Day.     • hydrOXYzine (ATARAX) 25 MG tablet Take 25 mg by mouth 3 (Three) Times a Day As Needed for Itching.     • lisinopril-hydrochlorothiazide (PRINZIDE,ZESTORETIC) 10-12.5 MG per tablet Take 1 tablet by mouth Daily. 15 tablet 0   • losartan (COZAAR) 25 MG tablet Take 25 mg by mouth Daily.     • meloxicam (MOBIC) 15 MG tablet Take 15 mg by mouth Daily.     • metFORMIN (GLUCOPHAGE) 1000 MG tablet Take 1,000 mg by mouth 2 (Two) Times a Day With Meals.     • nabumetone (RELAFEN) 500 MG tablet Take 500 mg by mouth 2 (Two) Times a Day As Needed for Mild Pain .       No current facility-administered medications on file prior to visit.        Physical Exam:   Temp 97.8 °F (36.6 °C)   Ht 182.9 cm (72.01\")   Wt 118 kg (260 lb)   BMI 35.25 kg/m²   MUSCULOSKELETAL:  Straight leg raising is negative.  Evan's Sign is negative.  ROM in the low back is normal.  Tenderness in the back to palpation is not observed.  NEUROLOGICAL:  Strength is intact in the lower extremities to direct testing.  When on his feet he has a little bit of trouble dorsiflexing the right foot but strength is overall intact with direct testing while sitting.  Muscle tone is normal throughout.  Station and gait are normal.  Sensation is intact to light touch testing throughout.  Deep tendon reflexes are difficult to elicit throughout.  Coordination is intact.        Medical Decision Making     Data Review:   (All imaging studies were personally reviewed unless stated otherwise)  Flexion-extension films demonstrate spondylosis but no " overt instability.     His lumbar MRI study shows some degenerative disc disease and facet arthropathy.  There is some disc-osteophyte in the recess and even proximal foramen on the right at L3-4 that could compromise the L4 nerve root.     Electrodiagnostic studies from 2019 raise the specter of a right L5/S1 radiculopathy.     Diagnosis:   Lumbar radiculopathy.     Treatment Options:   The patient is struggling with symptoms and as such I have recommended right L3-4 laminotomy, foraminotomy, and possible discectomy.  Given the chronicity of his symptoms surgery may not be as effective as we would like in improving his symptoms.  The nature of the procedure as well as the potential risks, complications, limitations, and alternatives to the procedure were discussed at length with the patient and the patient has agreed to proceed with surgery.          Diagnosis Plan   1. Lumbar disc herniation with radiculopathy      2. DDD (degenerative disc disease), lumbar

## 2021-06-09 NOTE — PROGRESS NOTES
Patient: Wenceslao Nava  : 1980    Primary Care Provider: Flores Chua PA    Requesting Provider: As above        History    Chief Complaint: Low back and right lower extremity pain with sensory alteration.    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  Most the pain extends into the shin toward the foot.  He has some modest symptoms on the left.  He has undergone extensive treatment including therapy, medicines, physical therapy.  All of this was to no avail.  He does have some numbness extending into his leg.  He is struggling and is unable to get comfortable.    Review of Systems   Constitutional: Negative for activity change, appetite change, chills, diaphoresis, fatigue, fever and unexpected weight change.   HENT: Negative for congestion, dental problem, drooling, ear discharge, ear pain, facial swelling, hearing loss, mouth sores, nosebleeds, postnasal drip, rhinorrhea, sinus pressure, sinus pain, sneezing, sore throat, tinnitus, trouble swallowing and voice change.    Eyes: Negative for photophobia, pain, discharge, redness, itching and visual disturbance.   Respiratory: Negative for apnea, cough, choking, chest tightness, shortness of breath, wheezing and stridor.    Cardiovascular: Negative for chest pain, palpitations and leg swelling.   Gastrointestinal: Negative for abdominal distention, abdominal pain, anal bleeding, blood in stool, constipation, diarrhea, nausea, rectal pain and vomiting.   Endocrine: Negative for cold intolerance, heat intolerance, polydipsia, polyphagia and polyuria.   Genitourinary: Negative for decreased urine volume, difficulty urinating, dysuria, enuresis, flank pain, frequency, genital sores, hematuria and urgency.   Musculoskeletal: Positive for back pain. Negative for arthralgias, gait problem, joint swelling, myalgias,  "neck pain and neck stiffness.   Skin: Negative for color change, pallor, rash and wound.   Allergic/Immunologic: Negative for environmental allergies, food allergies and immunocompromised state.   Neurological: Negative for dizziness, tremors, seizures, syncope, facial asymmetry, speech difficulty, weakness, light-headedness, numbness and headaches.   Hematological: Negative for adenopathy. Does not bruise/bleed easily.   Psychiatric/Behavioral: Negative for agitation, behavioral problems, confusion, decreased concentration, dysphoric mood, hallucinations, self-injury, sleep disturbance and suicidal ideas. The patient is not nervous/anxious and is not hyperactive.        The patient's past medical history, past surgical history, family history, and social history have been reviewed at length in the electronic medical record.    Physical Exam:   Temp 97.8 °F (36.6 °C)   Ht 182.9 cm (72.01\")   Wt 118 kg (260 lb)   BMI 35.25 kg/m²   MUSCULOSKELETAL:  Straight leg raising is negative.  Evan's Sign is negative.  ROM in the low back is normal.  Tenderness in the back to palpation is not observed.  NEUROLOGICAL:  Strength is intact in the lower extremities to direct testing.  When on his feet he has a little bit of trouble dorsiflexing the right foot but strength is overall intact with direct testing while sitting.  Muscle tone is normal throughout.  Station and gait are normal.  Sensation is intact to light touch testing throughout.  Deep tendon reflexes are difficult to elicit throughout.  Coordination is intact.      Medical Decision Making    Data Review:   (All imaging studies were personally reviewed unless stated otherwise)  Flexion-extension films demonstrate spondylosis but no overt instability.    His lumbar MRI study shows some degenerative disc disease and facet arthropathy.  There is some disc-osteophyte in the recess and even proximal foramen on the right at L3-4 that could compromise the L4 nerve " root.    Electrodiagnostic studies from 2019 raise the specter of a right L5/S1 radiculopathy.    Diagnosis:   Lumbar radiculopathy.    Treatment Options:   The patient is struggling with symptoms and as such I have recommended right L3-4 laminotomy, foraminotomy, and possible discectomy.  Given the chronicity of his symptoms surgery may not be as effective as we would like in improving his symptoms.  The nature of the procedure as well as the potential risks, complications, limitations, and alternatives to the procedure were discussed at length with the patient and the patient has agreed to proceed with surgery.       Diagnosis Plan   1. Lumbar disc herniation with radiculopathy     2. DDD (degenerative disc disease), lumbar         Scribed for Sreekanth Sorto MD by FERNIE Smith 6/9/2021 12:19 EDT      I, Dr. Sorto, personally performed the services described in the documentation, as scribed in my presence, and it is both accurate and complete.

## 2021-06-10 RX ORDER — CHLORHEXIDINE GLUCONATE 4 G/100ML
SOLUTION TOPICAL DAILY
Qty: 120 ML | Refills: 0 | Status: ON HOLD | OUTPATIENT
Start: 2021-06-10 | End: 2021-06-24

## 2021-06-23 ENCOUNTER — PRE-ADMISSION TESTING (OUTPATIENT)
Dept: PREADMISSION TESTING | Facility: HOSPITAL | Age: 41
End: 2021-06-23

## 2021-06-23 ENCOUNTER — ANESTHESIA EVENT (OUTPATIENT)
Dept: PERIOP | Facility: HOSPITAL | Age: 41
End: 2021-06-23

## 2021-06-23 VITALS — WEIGHT: 268.08 LBS | BODY MASS INDEX: 36.31 KG/M2 | HEIGHT: 72 IN

## 2021-06-23 LAB
DEPRECATED RDW RBC AUTO: 38.6 FL (ref 37–54)
ERYTHROCYTE [DISTWIDTH] IN BLOOD BY AUTOMATED COUNT: 12.3 % (ref 12.3–15.4)
HCT VFR BLD AUTO: 36.6 % (ref 37.5–51)
HGB BLD-MCNC: 12.6 G/DL (ref 13–17.7)
MCH RBC QN AUTO: 29.7 PG (ref 26.6–33)
MCHC RBC AUTO-ENTMCNC: 34.4 G/DL (ref 31.5–35.7)
MCV RBC AUTO: 86.3 FL (ref 79–97)
MRSA DNA SPEC QL NAA+PROBE: NEGATIVE
PLATELET # BLD AUTO: 268 10*3/MM3 (ref 140–450)
PMV BLD AUTO: 10.1 FL (ref 6–12)
POTASSIUM SERPL-SCNC: 3.5 MMOL/L (ref 3.5–5.2)
QT INTERVAL: 432 MS
QTC INTERVAL: 438 MS
RBC # BLD AUTO: 4.24 10*6/MM3 (ref 4.14–5.8)
SARS-COV-2 RNA RESP QL NAA+PROBE: NOT DETECTED
WBC # BLD AUTO: 7.53 10*3/MM3 (ref 3.4–10.8)

## 2021-06-23 PROCEDURE — 85027 COMPLETE CBC AUTOMATED: CPT

## 2021-06-23 PROCEDURE — U0003 INFECTIOUS AGENT DETECTION BY NUCLEIC ACID (DNA OR RNA); SEVERE ACUTE RESPIRATORY SYNDROME CORONAVIRUS 2 (SARS-COV-2) (CORONAVIRUS DISEASE [COVID-19]), AMPLIFIED PROBE TECHNIQUE, MAKING USE OF HIGH THROUGHPUT TECHNOLOGIES AS DESCRIBED BY CMS-2020-01-R: HCPCS

## 2021-06-23 PROCEDURE — 84132 ASSAY OF SERUM POTASSIUM: CPT

## 2021-06-23 PROCEDURE — 36415 COLL VENOUS BLD VENIPUNCTURE: CPT

## 2021-06-23 PROCEDURE — 93005 ELECTROCARDIOGRAM TRACING: CPT

## 2021-06-23 PROCEDURE — C9803 HOPD COVID-19 SPEC COLLECT: HCPCS

## 2021-06-23 PROCEDURE — 87641 MR-STAPH DNA AMP PROBE: CPT

## 2021-06-23 PROCEDURE — 93010 ELECTROCARDIOGRAM REPORT: CPT | Performed by: INTERNAL MEDICINE

## 2021-06-23 RX ORDER — SODIUM CHLORIDE 0.9 % (FLUSH) 0.9 %
10 SYRINGE (ML) INJECTION AS NEEDED
Status: CANCELLED | OUTPATIENT
Start: 2021-06-23

## 2021-06-23 RX ORDER — SODIUM CHLORIDE 0.9 % (FLUSH) 0.9 %
10 SYRINGE (ML) INJECTION EVERY 12 HOURS SCHEDULED
Status: CANCELLED | OUTPATIENT
Start: 2021-06-23

## 2021-06-23 RX ORDER — FAMOTIDINE 10 MG/ML
20 INJECTION, SOLUTION INTRAVENOUS ONCE
Status: CANCELLED | OUTPATIENT
Start: 2021-06-23 | End: 2021-06-23

## 2021-06-23 NOTE — PAT
An arrival time for procedure was not given during PAT visit. If patient had any questions or concerns about their arrival time, they were instructed to contact their surgeon/physician.  Additionally, if the patient referred to an arrival time that was acquired from their my chart account, patient was encouraged to verify that time with their surgeon/physician.  NO arrival times given in Pre Admission Testing Department.    Patient to apply Chlorhexadine wipes  to surgical area (as instructed) the night before procedure and the AM of procedure. Wipes provided.    Patient instructed to drink 20 ounces (or until full) of Gatorade and it needs to be completed 1 hour before given arrival time for procedure (NO RED Gatorade)    Patient verbalized understanding.

## 2021-06-24 ENCOUNTER — HOSPITAL ENCOUNTER (OUTPATIENT)
Facility: HOSPITAL | Age: 41
Discharge: HOME | End: 2021-06-24
Attending: NEUROLOGICAL SURGERY | Admitting: NEUROLOGICAL SURGERY

## 2021-06-24 ENCOUNTER — APPOINTMENT (OUTPATIENT)
Dept: GENERAL RADIOLOGY | Facility: HOSPITAL | Age: 41
End: 2021-06-24

## 2021-06-24 ENCOUNTER — ANESTHESIA (OUTPATIENT)
Dept: PERIOP | Facility: HOSPITAL | Age: 41
End: 2021-06-24

## 2021-06-24 VITALS
TEMPERATURE: 98.2 F | WEIGHT: 268 LBS | DIASTOLIC BLOOD PRESSURE: 63 MMHG | SYSTOLIC BLOOD PRESSURE: 110 MMHG | OXYGEN SATURATION: 95 % | HEART RATE: 74 BPM | BODY MASS INDEX: 36.3 KG/M2 | RESPIRATION RATE: 16 BRPM | HEIGHT: 72 IN

## 2021-06-24 DIAGNOSIS — M51.26 HNP (HERNIATED NUCLEUS PULPOSUS), LUMBAR: ICD-10-CM

## 2021-06-24 LAB — GLUCOSE BLDC GLUCOMTR-MCNC: 145 MG/DL (ref 70–130)

## 2021-06-24 PROCEDURE — 25010000002 HYDROMORPHONE PER 4 MG: Performed by: NURSE ANESTHETIST, CERTIFIED REGISTERED

## 2021-06-24 PROCEDURE — 82962 GLUCOSE BLOOD TEST: CPT

## 2021-06-24 PROCEDURE — 25010000002 METHYLPREDNISOLONE PER 40 MG: Performed by: NEUROLOGICAL SURGERY

## 2021-06-24 PROCEDURE — 25010000002 DEXAMETHASONE PER 1 MG: Performed by: NURSE ANESTHETIST, CERTIFIED REGISTERED

## 2021-06-24 PROCEDURE — 25010000002 FENTANYL CITRATE (PF) 50 MCG/ML SOLUTION: Performed by: NURSE ANESTHETIST, CERTIFIED REGISTERED

## 2021-06-24 PROCEDURE — 25010000003 LIDOCAINE 1 % SOLUTION: Performed by: NURSE ANESTHETIST, CERTIFIED REGISTERED

## 2021-06-24 PROCEDURE — C1889 IMPLANT/INSERT DEVICE, NOC: HCPCS | Performed by: NEUROLOGICAL SURGERY

## 2021-06-24 PROCEDURE — 76000 FLUOROSCOPY <1 HR PHYS/QHP: CPT

## 2021-06-24 PROCEDURE — 63030 LAMOT DCMPRN NRV RT 1 LMBR: CPT | Performed by: PHYSICIAN ASSISTANT

## 2021-06-24 PROCEDURE — 25010000002 PROPOFOL 10 MG/ML EMULSION: Performed by: NURSE ANESTHETIST, CERTIFIED REGISTERED

## 2021-06-24 PROCEDURE — 25010000003 CEFAZOLIN IN DEXTROSE 2-4 GM/100ML-% SOLUTION: Performed by: NEUROLOGICAL SURGERY

## 2021-06-24 PROCEDURE — 63030 LAMOT DCMPRN NRV RT 1 LMBR: CPT | Performed by: NEUROLOGICAL SURGERY

## 2021-06-24 DEVICE — FLOSEAL HEMOSTATIC MATRIX, 10ML
Type: IMPLANTABLE DEVICE | Site: SPINE LUMBAR | Status: FUNCTIONAL
Brand: FLOSEAL HEMOSTATIC MATRIX

## 2021-06-24 DEVICE — IMPLANTABLE DEVICE
Type: IMPLANTABLE DEVICE | Site: SPINE LUMBAR | Status: FUNCTIONAL
Brand: SURGIFOAM® ABSORBABLE GELATIN SPONGE, U.S.P.

## 2021-06-24 RX ORDER — DEXAMETHASONE SODIUM PHOSPHATE 4 MG/ML
INJECTION, SOLUTION INTRA-ARTICULAR; INTRALESIONAL; INTRAMUSCULAR; INTRAVENOUS; SOFT TISSUE AS NEEDED
Status: DISCONTINUED | OUTPATIENT
Start: 2021-06-24 | End: 2021-06-24 | Stop reason: SURG

## 2021-06-24 RX ORDER — ROCURONIUM BROMIDE 10 MG/ML
INJECTION, SOLUTION INTRAVENOUS AS NEEDED
Status: DISCONTINUED | OUTPATIENT
Start: 2021-06-24 | End: 2021-06-24 | Stop reason: SURG

## 2021-06-24 RX ORDER — CEFAZOLIN SODIUM 2 G/100ML
2 INJECTION, SOLUTION INTRAVENOUS ONCE
Status: COMPLETED | OUTPATIENT
Start: 2021-06-24 | End: 2021-06-24

## 2021-06-24 RX ORDER — PROMETHAZINE HYDROCHLORIDE 25 MG/1
25 TABLET ORAL ONCE AS NEEDED
Status: DISCONTINUED | OUTPATIENT
Start: 2021-06-24 | End: 2021-06-24 | Stop reason: HOSPADM

## 2021-06-24 RX ORDER — SODIUM CHLORIDE, SODIUM LACTATE, POTASSIUM CHLORIDE, CALCIUM CHLORIDE 600; 310; 30; 20 MG/100ML; MG/100ML; MG/100ML; MG/100ML
9 INJECTION, SOLUTION INTRAVENOUS CONTINUOUS
Status: DISCONTINUED | OUTPATIENT
Start: 2021-06-24 | End: 2021-06-24 | Stop reason: HOSPADM

## 2021-06-24 RX ORDER — PROPOFOL 10 MG/ML
VIAL (ML) INTRAVENOUS AS NEEDED
Status: DISCONTINUED | OUTPATIENT
Start: 2021-06-24 | End: 2021-06-24 | Stop reason: SURG

## 2021-06-24 RX ORDER — MIDAZOLAM HYDROCHLORIDE 1 MG/ML
1 INJECTION INTRAMUSCULAR; INTRAVENOUS
Status: DISCONTINUED | OUTPATIENT
Start: 2021-06-24 | End: 2021-06-24 | Stop reason: HOSPADM

## 2021-06-24 RX ORDER — LIDOCAINE HYDROCHLORIDE 10 MG/ML
0.5 INJECTION, SOLUTION EPIDURAL; INFILTRATION; INTRACAUDAL; PERINEURAL ONCE AS NEEDED
Status: COMPLETED | OUTPATIENT
Start: 2021-06-24 | End: 2021-06-24

## 2021-06-24 RX ORDER — HYDROCODONE BITARTRATE AND ACETAMINOPHEN 7.5; 325 MG/1; MG/1
1 TABLET ORAL 3 TIMES DAILY
Qty: 20 TABLET | Refills: 0 | Status: SHIPPED | OUTPATIENT
Start: 2021-06-24 | End: 2021-08-12 | Stop reason: HOSPADM

## 2021-06-24 RX ORDER — HYDRALAZINE HYDROCHLORIDE 20 MG/ML
5 INJECTION INTRAMUSCULAR; INTRAVENOUS
Status: CANCELLED | OUTPATIENT
Start: 2021-06-24

## 2021-06-24 RX ORDER — METHYLPREDNISOLONE ACETATE 40 MG/ML
INJECTION, SUSPENSION INTRA-ARTICULAR; INTRALESIONAL; INTRAMUSCULAR; SOFT TISSUE AS NEEDED
Status: DISCONTINUED | OUTPATIENT
Start: 2021-06-24 | End: 2021-06-24 | Stop reason: HOSPADM

## 2021-06-24 RX ORDER — MAGNESIUM HYDROXIDE 1200 MG/15ML
LIQUID ORAL AS NEEDED
Status: DISCONTINUED | OUTPATIENT
Start: 2021-06-24 | End: 2021-06-24 | Stop reason: HOSPADM

## 2021-06-24 RX ORDER — FENTANYL CITRATE 50 UG/ML
INJECTION, SOLUTION INTRAMUSCULAR; INTRAVENOUS AS NEEDED
Status: DISCONTINUED | OUTPATIENT
Start: 2021-06-24 | End: 2021-06-24 | Stop reason: SURG

## 2021-06-24 RX ORDER — MEPERIDINE HYDROCHLORIDE 25 MG/ML
12.5 INJECTION INTRAMUSCULAR; INTRAVENOUS; SUBCUTANEOUS
Status: DISCONTINUED | OUTPATIENT
Start: 2021-06-24 | End: 2021-06-24 | Stop reason: HOSPADM

## 2021-06-24 RX ORDER — FAMOTIDINE 20 MG/1
20 TABLET, FILM COATED ORAL ONCE
Status: COMPLETED | OUTPATIENT
Start: 2021-06-24 | End: 2021-06-24

## 2021-06-24 RX ORDER — LIDOCAINE HYDROCHLORIDE 10 MG/ML
INJECTION, SOLUTION INFILTRATION; PERINEURAL AS NEEDED
Status: DISCONTINUED | OUTPATIENT
Start: 2021-06-24 | End: 2021-06-24 | Stop reason: SURG

## 2021-06-24 RX ORDER — PROMETHAZINE HYDROCHLORIDE 25 MG/1
25 SUPPOSITORY RECTAL ONCE AS NEEDED
Status: DISCONTINUED | OUTPATIENT
Start: 2021-06-24 | End: 2021-06-24 | Stop reason: HOSPADM

## 2021-06-24 RX ORDER — FENTANYL CITRATE 50 UG/ML
50 INJECTION, SOLUTION INTRAMUSCULAR; INTRAVENOUS
Status: DISCONTINUED | OUTPATIENT
Start: 2021-06-24 | End: 2021-06-24 | Stop reason: HOSPADM

## 2021-06-24 RX ORDER — LABETALOL HYDROCHLORIDE 5 MG/ML
5 INJECTION, SOLUTION INTRAVENOUS
Status: CANCELLED | OUTPATIENT
Start: 2021-06-24

## 2021-06-24 RX ORDER — HYDROMORPHONE HYDROCHLORIDE 1 MG/ML
0.5 INJECTION, SOLUTION INTRAMUSCULAR; INTRAVENOUS; SUBCUTANEOUS
Status: DISCONTINUED | OUTPATIENT
Start: 2021-06-24 | End: 2021-06-24 | Stop reason: HOSPADM

## 2021-06-24 RX ORDER — BUPIVACAINE HYDROCHLORIDE AND EPINEPHRINE 2.5; 5 MG/ML; UG/ML
INJECTION, SOLUTION EPIDURAL; INFILTRATION; INTRACAUDAL; PERINEURAL AS NEEDED
Status: DISCONTINUED | OUTPATIENT
Start: 2021-06-24 | End: 2021-06-24 | Stop reason: HOSPADM

## 2021-06-24 RX ORDER — EPHEDRINE SULFATE 50 MG/ML
INJECTION, SOLUTION INTRAVENOUS AS NEEDED
Status: DISCONTINUED | OUTPATIENT
Start: 2021-06-24 | End: 2021-06-24 | Stop reason: SURG

## 2021-06-24 RX ADMIN — HYDROMORPHONE HYDROCHLORIDE 0.5 MG: 1 INJECTION, SOLUTION INTRAMUSCULAR; INTRAVENOUS; SUBCUTANEOUS at 10:00

## 2021-06-24 RX ADMIN — FENTANYL CITRATE 100 MCG: 50 INJECTION, SOLUTION INTRAMUSCULAR; INTRAVENOUS at 08:35

## 2021-06-24 RX ADMIN — PROPOFOL 200 MG: 10 INJECTION, EMULSION INTRAVENOUS at 08:35

## 2021-06-24 RX ADMIN — FENTANYL CITRATE 50 MCG: 50 INJECTION, SOLUTION INTRAMUSCULAR; INTRAVENOUS at 09:48

## 2021-06-24 RX ADMIN — HYDROMORPHONE HYDROCHLORIDE 0.5 MG: 1 INJECTION, SOLUTION INTRAMUSCULAR; INTRAVENOUS; SUBCUTANEOUS at 10:15

## 2021-06-24 RX ADMIN — ROCURONIUM BROMIDE 50 MG: 10 INJECTION, SOLUTION INTRAVENOUS at 08:35

## 2021-06-24 RX ADMIN — EPHEDRINE SULFATE 5 MG: 50 INJECTION INTRAVENOUS at 09:00

## 2021-06-24 RX ADMIN — SODIUM CHLORIDE, POTASSIUM CHLORIDE, SODIUM LACTATE AND CALCIUM CHLORIDE 9 ML/HR: 600; 310; 30; 20 INJECTION, SOLUTION INTRAVENOUS at 07:40

## 2021-06-24 RX ADMIN — EPHEDRINE SULFATE 10 MG: 50 INJECTION INTRAVENOUS at 08:59

## 2021-06-24 RX ADMIN — LIDOCAINE HYDROCHLORIDE 0.2 ML: 10 INJECTION, SOLUTION EPIDURAL; INFILTRATION; INTRACAUDAL; PERINEURAL at 07:40

## 2021-06-24 RX ADMIN — CEFAZOLIN SODIUM 2 G: 10 INJECTION, POWDER, FOR SOLUTION INTRAVENOUS at 08:34

## 2021-06-24 RX ADMIN — FAMOTIDINE 20 MG: 20 TABLET, FILM COATED ORAL at 07:54

## 2021-06-24 RX ADMIN — DEXAMETHASONE SODIUM PHOSPHATE 8 MG: 4 INJECTION, SOLUTION INTRA-ARTICULAR; INTRALESIONAL; INTRAMUSCULAR; INTRAVENOUS; SOFT TISSUE at 08:38

## 2021-06-24 RX ADMIN — FENTANYL CITRATE 50 MCG: 50 INJECTION, SOLUTION INTRAMUSCULAR; INTRAVENOUS at 09:51

## 2021-06-24 RX ADMIN — FENTANYL CITRATE 50 MCG: 50 INJECTION INTRAMUSCULAR; INTRAVENOUS at 10:15

## 2021-06-24 RX ADMIN — EPHEDRINE SULFATE 10 MG: 50 INJECTION INTRAVENOUS at 09:09

## 2021-06-24 RX ADMIN — LIDOCAINE HYDROCHLORIDE 50 MG: 10 INJECTION, SOLUTION INFILTRATION; PERINEURAL at 08:35

## 2021-06-24 NOTE — OP NOTE
NEUROSURGICAL OPERATIVE NOTE        PREOPERATIVE DIAGNOSIS:    Right L3-4 recess and foraminal stenosis with disc herniation      POSTOPERATIVE DIAGNOSIS:  Same      PROCEDURE:  Right L3-4 laminotomy, medial facetectomy, foraminotomy, and discectomy      SURGEON:  Sreekanth Sorto M.D.      ASSISTANT: Flores Parker PA-C    PAC assisted with:   Suctioning   Retraction   Tying   Suturing   Closing   Application of dressing   Skilled neurosurgery PA assistance was necessary to perform this procedure.        ANESTHESIA:  General      ESTIMATED BLOOD LOSS: Minimal      SPECIMEN: None      DRAINS: None      COMPLICATIONS:  None      CLINICAL NOTE:  The patient is a 41-year-old gentleman with chronic back pain and associated right leg pain.  Studies demonstrate recess and foraminal narrowing on the right at L3-4 as well as some degree of disc protrusion.  Given his ongoing symptoms he presents at this time for lumbar decompression.  The nature of the procedure as well as the potential risks, complications, limitations, and alternatives to the procedure were discussed at length with the patient and the patient has agreed to proceed with surgery.      TECHNICAL NOTE:  The patient was brought to the operating room and while on his cart general endotracheal anesthesia was achieved. He was then turned prone onto the [facility] saddle frame. Special care was ensured to protect pressure points. His low back was prepared and draped in the usual fashion and a localizing radiograph was obtained with a spinal needle in the lumbosacral midline utilizing the C-arm.  Based on this a 2.5-3 cm vertical incision was fashioned overlying the L3-L4 and underlying tissues were divided with cautery to provide exposure to the right L3 and L4 hemilamina. Laminotomy was fashioned and another radiograph confirmed the operative level. The medial aspect of the facet was undercut with drill and Kerrison punches, the neural foramen was unroofed with  Kerrison punches. Thickened interlaminar ligament was removed. Subligamentous disk herniation into the recess was noted, disk was incised, friable disk material was evacuated with pituitary rongeur. There was some ligamentous thickening in the midline that tethered the nerve roots a little bit. I divided some of this to release the dural sac. At completion of the procedure there was some spurring around the disk space but no soft disk herniation. I was able to pass a ball probe across the midline and along the L4 nerve root into the foramen. Modest bleeding points were controlled with bipolar cautery and Floseal. The wound was washed out with saline solution. With the valsalva maneuver there was no significant bleeding or evidence of CSF leak.  A small Gelfoam sponge soaked in Depo-Medrol was left over the exposed dura and nerve root.  The paraspinous muscle and fascia were then reapproximated in interrupted fashion with 0 Vicryl suture. Marcaine 0.25% was instilled in the paraspinous musculature and subcutaneous tissues. Subcutaneous tissues were closed in layers with 3-0 Vicryl suture. The skin was closed in running subcuticular fashion with 3-0 Vicryl suture. A dermal sealant and sterile dressing were applied. The patient was rolled onto his cart and extubated, and taken to recovery room in satisfactory condition.            Sreekanth Sorto M.D.

## 2021-06-24 NOTE — ANESTHESIA POSTPROCEDURE EVALUATION
Patient: Wenceslao Nava    Procedure Summary     Date: 06/24/21 Room / Location:  [facility]  /  [facility]    Anesthesia Start: 0834 Anesthesia Stop: 0952    Procedure: LAMINOTOMY, FORAMINOTOMY, DISCECTOMY, L3-4 RIGHT (Right Spine Lumbar) Diagnosis:       HNP (herniated nucleus pulposus), lumbar      (HNP (herniated nucleus pulposus), lumbar [M51.26])    Surgeons: Sreekanth Sorto MD Provider: Michel Drake MD    Anesthesia Type: general ASA Status: 3          Anesthesia Type: general    Vitals  Vitals Value Taken Time   /126 06/24/21 0947   Temp     Pulse 83 06/24/21 0951   Resp     SpO2 97 % 06/24/21 0951   Vitals shown include unvalidated device data.        Post Anesthesia Care and Evaluation    Patient location during evaluation: PACU  Patient participation: complete - patient participated  Level of consciousness: awake and alert  Pain score: 0  Pain management: adequate  Airway patency: patent  Anesthetic complications: No anesthetic complications  PONV Status: none  Cardiovascular status: hemodynamically stable and acceptable  Respiratory status: nonlabored ventilation, acceptable and nasal cannula  Hydration status: acceptable

## 2021-06-24 NOTE — INTERVAL H&P NOTE
"[facility] Pre-op    Full history and physical note from office is attached.    /88 (BP Location: Right arm, Patient Position: Sitting)   Pulse 68   Temp 97.2 °F (36.2 °C) (Temporal)   Resp 18   Ht 182.9 cm (72\")   Wt 122 kg (268 lb)   SpO2 98%   BMI 36.35 kg/m²     Immunizations:  Influenza:  2020  Pneumococcal:  UTD  Tetanus:  UTD  Covid : No    LAB Results:  Lab Results   Component Value Date    WBC 7.53 06/23/2021    HGB 12.6 (L) 06/23/2021    HCT 36.6 (L) 06/23/2021    MCV 86.3 06/23/2021     06/23/2021    NEUTROABS 7.85 (H) 09/17/2020    GLUCOSE 95 09/17/2020    BUN 13 09/17/2020    CREATININE 0.93 09/17/2020    EGFRIFNONA 90 09/17/2020     (L) 09/17/2020    K 3.5 06/23/2021    CL 99 09/17/2020    CO2 22.4 09/17/2020    MG 1.8 09/17/2020    CALCIUM 8.6 09/17/2020    ALBUMIN 4.19 09/17/2020    AST 19 09/17/2020    ALT 24 09/17/2020    BILITOT 0.7 09/17/2020    PTT 27.4 01/31/2014    INR 0.96 12/14/2016 4/26/21 MRI lumbar:  IMPRESSION:     1. Essentially stable exam demonstrating degenerative disc disease  combined with facet arthropathy and congenital shortening of the  pedicles resulting in diffuse spinal stenosis as detailed above.  2. No fracture or traumatic malalignment.  3. No acute appearing disc herniations identified.    Cancer Staging (if applicable)  Cancer Patient: __ yes __no __unknown__N/A; If yes, clinical stage T:__ N:__M:__, stage group or __N/A      Impression: HNP (herniated nucleus pulposus), lumbar       Plan: LAMINOTOMY, FORAMINOTOMY, POSSIBLE DISCECTOMY, L3-4 RIGHT      MAGED Macias   6/24/2021   08:00 EDT   "

## 2021-06-24 NOTE — ANESTHESIA PROCEDURE NOTES
Airway  Urgency: elective    Date/Time: 6/24/2021 8:36 AM  Airway not difficult    General Information and Staff    Patient location during procedure: OR  CRNA: Dariana Harley CRNA    Indications and Patient Condition  Indications for airway management: airway protection    Preoxygenated: yes  MILS not maintained throughout  Mask difficulty assessment: 1 - vent by mask    Final Airway Details  Final airway type: endotracheal airway      Successful airway: ETT  Cuffed: yes   Successful intubation technique: direct laryngoscopy  Endotracheal tube insertion site: oral  Blade: Kadie  Blade size: 3  ETT size (mm): 7.5  Cormack-Lehane Classification: grade I - full view of glottis  Placement verified by: chest auscultation and capnometry   Cuff volume (mL): 10  Measured from: lips  ETT/EBT  to lips (cm): 20  Number of attempts at approach: 1  Assessment: lips, teeth, and gum same as pre-op and atraumatic intubation    Additional Comments  Negative epigastric sounds, Breath sound equal bilaterally with symmetric chest rise and fall

## 2021-06-24 NOTE — ANESTHESIA PREPROCEDURE EVALUATION
Anesthesia Evaluation     Patient summary reviewed and Nursing notes reviewed   no history of anesthetic complications:  NPO Solid Status: > 8 hours  NPO Liquid Status: > 2 hours           Airway   Mallampati: II  TM distance: >3 FB  Neck ROM: full  No difficulty expected  Dental - normal exam     Pulmonary - negative pulmonary ROS and normal exam    breath sounds clear to auscultation  Cardiovascular - normal exam    ECG reviewed  Rhythm: regular  Rate: normal    (+) hypertension,     ROS comment: 9/20 Stress Echo:  · A pharmacological stress test was performed using regadenoson without low-level exercise.  · Findings consistent with a normal ECG stress test.  · Myocardial perfusion imaging indicates a normal myocardial perfusion study with no evidence of ischemia.  · Normal LV cavity size. Borderline normal LV wall motion noted.  · Left ventricular ejection fraction is borderline normal. (Calculated EF = 50%).  · Impressions are consistent with a low risk study.    Neuro/Psych- negative ROS  GI/Hepatic/Renal/Endo    (+) obesity,   diabetes mellitus type 2,     Musculoskeletal     (+) back pain,   Abdominal    Substance History      OB/GYN          Other   arthritis,                      Anesthesia Plan    ASA 3     general     intravenous induction     Anesthetic plan, all risks, benefits, and alternatives have been provided, discussed and informed consent has been obtained with: patient.    Plan discussed with CRNA.

## 2021-06-25 ENCOUNTER — TELEPHONE (OUTPATIENT)
Dept: NEUROSURGERY | Facility: CLINIC | Age: 41
End: 2021-06-25

## 2021-06-25 ENCOUNTER — APPOINTMENT (OUTPATIENT)
Dept: PREADMISSION TESTING | Facility: HOSPITAL | Age: 41
End: 2021-06-25

## 2021-06-25 DIAGNOSIS — M51.26 HNP (HERNIATED NUCLEUS PULPOSUS), LUMBAR: ICD-10-CM

## 2021-06-25 RX ORDER — HYDROCODONE BITARTRATE AND ACETAMINOPHEN 7.5; 325 MG/1; MG/1
1 TABLET ORAL 3 TIMES DAILY
Qty: 20 TABLET | Refills: 0 | Status: CANCELLED | OUTPATIENT
Start: 2021-06-25

## 2021-06-25 RX ORDER — HYDROCODONE BITARTRATE AND ACETAMINOPHEN 7.5; 325 MG/1; MG/1
1 TABLET ORAL 3 TIMES DAILY
Qty: 20 TABLET | Refills: 0 | OUTPATIENT
Start: 2021-06-25

## 2021-06-25 NOTE — TELEPHONE ENCOUNTER
Caller: CARLOS Bashir    Relationship: Emergency Contact    Best call back number: [phone]  Medication needed:   Requested Prescriptions     Pending Prescriptions Disp Refills   • HYDROcodone-acetaminophen (NORCO) 7.5-325 MG per tablet 20 tablet 0     Sig: Take 1 tablet by mouth 3 (Three) Times a Day.       When do you need the refill by:N/A    PLEASE RESUBMIT THE PRESCRIPTION TO THE FOLLOWING PHARMACY     What is the patient's preferred pharmacy:    [facility]  [address] [place] [address] [phone]      THANK YOU

## 2021-06-25 NOTE — TELEPHONE ENCOUNTER
Provider:  Macario  Caller: patient  Time of call:  3:30   Phone #:  [phone]  Surgery:  Laminotomy  Surgery Date: Yesterday  Last visit:   same  Next visit:     CINDY:     04/12/2021 Hydrocodone/Acetaminophen  325MG/7.5MG  1980 90 30 [facility] [place] [facility]  05/12/2021 Gabapentin 400MG 1980 90 30 [facility] [place] 1  05/12/2021 Hydrocodone/Acetaminophen  325MG/7.5MG  1980 90 30 [facility] [place] [facility] 1  06/10/2021 Gabapentin 400MG 1980 90 30 [facility] [place] 1  06/10/2021 Hydrocodone/Acetaminophen  325MG/7.5MG  1980 90 30 [facility] [place] [facility] 1    Reason for call:     Patient requests refill on [place].

## 2021-06-25 NOTE — TELEPHONE ENCOUNTER
He receives pain medicine from a different provider.   - we do not write for additional pain medicine.

## 2021-06-28 ENCOUNTER — TELEPHONE (OUTPATIENT)
Dept: NEUROSURGERY | Facility: CLINIC | Age: 41
End: 2021-06-28

## 2021-06-28 NOTE — TELEPHONE ENCOUNTER
Provider:Macario    Caller: patient  Time of call:   12:58  Phone #:  [phone]  Surgery:  Laminotomy, foraminotomy & discectomy   Surgery Date:  06/24/21  Last visit:   same  Next visit:     CINDY:         Reason for call:     Patient complains of right lower extremity pain and numbness.  He had the pain and numbness prior to surgery, but states the pain and numbness has worsened.    Wants to know if this is normal. Patient states he has been taking it easy.

## 2021-07-11 ENCOUNTER — APPOINTMENT (OUTPATIENT)
Dept: CT IMAGING | Facility: HOSPITAL | Age: 41
End: 2021-07-11

## 2021-07-11 ENCOUNTER — HOSPITAL ENCOUNTER (EMERGENCY)
Facility: HOSPITAL | Age: 41
Discharge: HOME | End: 2021-07-11
Attending: FAMILY MEDICINE | Admitting: FAMILY MEDICINE

## 2021-07-11 VITALS
HEART RATE: 60 BPM | TEMPERATURE: 98.4 F | DIASTOLIC BLOOD PRESSURE: 55 MMHG | OXYGEN SATURATION: 97 % | SYSTOLIC BLOOD PRESSURE: 110 MMHG | HEIGHT: 72 IN | WEIGHT: 260 LBS | RESPIRATION RATE: 18 BRPM | BODY MASS INDEX: 35.21 KG/M2

## 2021-07-11 DIAGNOSIS — M54.50 CHRONIC MIDLINE LOW BACK PAIN WITHOUT SCIATICA: Primary | ICD-10-CM

## 2021-07-11 DIAGNOSIS — G89.29 CHRONIC MIDLINE LOW BACK PAIN WITHOUT SCIATICA: Primary | ICD-10-CM

## 2021-07-11 LAB
ALBUMIN SERPL-MCNC: 4.25 G/DL (ref 3.5–5.2)
ALBUMIN/GLOB SERPL: 1.3 G/DL
ALP SERPL-CCNC: 89 U/L (ref 39–117)
ALT SERPL W P-5'-P-CCNC: 19 U/L (ref 1–41)
ANION GAP SERPL CALCULATED.3IONS-SCNC: 11.1 MMOL/L (ref 5–15)
AST SERPL-CCNC: 14 U/L (ref 1–40)
BASOPHILS # BLD AUTO: 0.04 10*3/MM3 (ref 0–0.2)
BASOPHILS NFR BLD AUTO: 0.8 % (ref 0–1.5)
BILIRUB SERPL-MCNC: 0.3 MG/DL (ref 0–1.2)
BILIRUB UR QL STRIP: NEGATIVE
BUN SERPL-MCNC: 13 MG/DL (ref 6–20)
BUN/CREAT SERPL: 12.7 (ref 7–25)
CALCIUM SPEC-SCNC: 9.1 MG/DL (ref 8.6–10.5)
CHLORIDE SERPL-SCNC: 98 MMOL/L (ref 98–107)
CLARITY UR: CLEAR
CO2 SERPL-SCNC: 26.9 MMOL/L (ref 22–29)
COLOR UR: YELLOW
CREAT SERPL-MCNC: 1.02 MG/DL (ref 0.76–1.27)
DEPRECATED RDW RBC AUTO: 39.2 FL (ref 37–54)
EOSINOPHIL # BLD AUTO: 0.27 10*3/MM3 (ref 0–0.4)
EOSINOPHIL NFR BLD AUTO: 5.1 % (ref 0.3–6.2)
ERYTHROCYTE [DISTWIDTH] IN BLOOD BY AUTOMATED COUNT: 12.2 % (ref 12.3–15.4)
FLUAV RNA RESP QL NAA+PROBE: NOT DETECTED
FLUBV RNA RESP QL NAA+PROBE: NOT DETECTED
GFR SERPL CREATININE-BSD FRML MDRD: 80 ML/MIN/1.73
GLOBULIN UR ELPH-MCNC: 3.4 GM/DL
GLUCOSE SERPL-MCNC: 128 MG/DL (ref 65–99)
GLUCOSE UR STRIP-MCNC: NEGATIVE MG/DL
HCT VFR BLD AUTO: 37.6 % (ref 37.5–51)
HGB BLD-MCNC: 13.2 G/DL (ref 13–17.7)
HGB UR QL STRIP.AUTO: NEGATIVE
HOLD SPECIMEN: NORMAL
HOLD SPECIMEN: NORMAL
IMM GRANULOCYTES # BLD AUTO: 0.01 10*3/MM3 (ref 0–0.05)
IMM GRANULOCYTES NFR BLD AUTO: 0.2 % (ref 0–0.5)
KETONES UR QL STRIP: NEGATIVE
LEUKOCYTE ESTERASE UR QL STRIP.AUTO: NEGATIVE
LYMPHOCYTES # BLD AUTO: 1.17 10*3/MM3 (ref 0.7–3.1)
LYMPHOCYTES NFR BLD AUTO: 22 % (ref 19.6–45.3)
MCH RBC QN AUTO: 31.1 PG (ref 26.6–33)
MCHC RBC AUTO-ENTMCNC: 35.1 G/DL (ref 31.5–35.7)
MCV RBC AUTO: 88.5 FL (ref 79–97)
MONOCYTES # BLD AUTO: 0.52 10*3/MM3 (ref 0.1–0.9)
MONOCYTES NFR BLD AUTO: 9.8 % (ref 5–12)
NEUTROPHILS NFR BLD AUTO: 3.32 10*3/MM3 (ref 1.7–7)
NEUTROPHILS NFR BLD AUTO: 62.1 % (ref 42.7–76)
NITRITE UR QL STRIP: NEGATIVE
NRBC BLD AUTO-RTO: 0 /100 WBC (ref 0–0.2)
PH UR STRIP.AUTO: 6 [PH] (ref 5–8)
PLATELET # BLD AUTO: 255 10*3/MM3 (ref 140–450)
PMV BLD AUTO: 10.3 FL (ref 6–12)
POTASSIUM SERPL-SCNC: 3.6 MMOL/L (ref 3.5–5.2)
PROT SERPL-MCNC: 7.6 G/DL (ref 6–8.5)
PROT UR QL STRIP: NEGATIVE
RBC # BLD AUTO: 4.25 10*6/MM3 (ref 4.14–5.8)
SARS-COV-2 RNA RESP QL NAA+PROBE: NOT DETECTED
SODIUM SERPL-SCNC: 136 MMOL/L (ref 136–145)
SP GR UR STRIP: 1.02 (ref 1–1.03)
UROBILINOGEN UR QL STRIP: NORMAL
WBC # BLD AUTO: 5.33 10*3/MM3 (ref 3.4–10.8)
WHOLE BLOOD HOLD SPECIMEN: NORMAL

## 2021-07-11 PROCEDURE — 96374 THER/PROPH/DIAG INJ IV PUSH: CPT

## 2021-07-11 PROCEDURE — 87636 SARSCOV2 & INF A&B AMP PRB: CPT | Performed by: PHYSICIAN ASSISTANT

## 2021-07-11 PROCEDURE — 25010000002 KETOROLAC TROMETHAMINE PER 15 MG: Performed by: FAMILY MEDICINE

## 2021-07-11 PROCEDURE — 81003 URINALYSIS AUTO W/O SCOPE: CPT | Performed by: PHYSICIAN ASSISTANT

## 2021-07-11 PROCEDURE — 72131 CT LUMBAR SPINE W/O DYE: CPT

## 2021-07-11 PROCEDURE — 36415 COLL VENOUS BLD VENIPUNCTURE: CPT

## 2021-07-11 PROCEDURE — 85025 COMPLETE CBC W/AUTO DIFF WBC: CPT | Performed by: PHYSICIAN ASSISTANT

## 2021-07-11 PROCEDURE — 25010000002 HYDROMORPHONE 1 MG/ML SOLUTION: Performed by: FAMILY MEDICINE

## 2021-07-11 PROCEDURE — 96375 TX/PRO/DX INJ NEW DRUG ADDON: CPT

## 2021-07-11 PROCEDURE — 80053 COMPREHEN METABOLIC PANEL: CPT | Performed by: PHYSICIAN ASSISTANT

## 2021-07-11 PROCEDURE — 72131 CT LUMBAR SPINE W/O DYE: CPT | Performed by: RADIOLOGY

## 2021-07-11 PROCEDURE — 99283 EMERGENCY DEPT VISIT LOW MDM: CPT

## 2021-07-11 RX ORDER — KETOROLAC TROMETHAMINE 30 MG/ML
30 INJECTION, SOLUTION INTRAMUSCULAR; INTRAVENOUS ONCE
Status: COMPLETED | OUTPATIENT
Start: 2021-07-11 | End: 2021-07-11

## 2021-07-11 RX ADMIN — HYDROMORPHONE HYDROCHLORIDE 1 MG: 1 INJECTION, SOLUTION INTRAMUSCULAR; INTRAVENOUS; SUBCUTANEOUS at 16:14

## 2021-07-11 RX ADMIN — KETOROLAC TROMETHAMINE 30 MG: 30 INJECTION, SOLUTION INTRAMUSCULAR; INTRAVENOUS at 16:14

## 2021-07-11 NOTE — ED NOTES
Attempted to draw labs but pt was gone for scan. Will collect when pt returns.      Polina Gonzalez  07/11/21 1538

## 2021-07-11 NOTE — ED PROVIDER NOTES
Subjective   Patient complains of back pain, for several days.  Patient states that he sees Dr. Hooper in [place] and had a discectomy last month.  Patient is continuing to have pain denies any bladder incontinence or bowel incontinence       History provided by:  Patient   used: No    Back Pain  Location:  Lumbar spine  Quality:  Aching  Radiates to:  Does not radiate  Pain severity:  Mild  Pain is:  Same all the time  Onset quality:  Sudden  Duration:  1 day  Timing:  Constant  Progression:  Worsening  Chronicity:  Chronic  Relieved by:  Nothing  Worsened by:  Bending and movement  Ineffective treatments:  Narcotics  Associated symptoms: no bladder incontinence, no bowel incontinence, no chest pain, no dysuria, no fever, no headaches, no paresthesias and no perianal numbness        Review of Systems   Constitutional: Negative for chills and fever.   HENT: Negative for congestion, ear pain and sore throat.    Respiratory: Negative for cough, shortness of breath and wheezing.    Cardiovascular: Negative for chest pain.   Gastrointestinal: Negative for bowel incontinence, diarrhea, nausea and vomiting.   Genitourinary: Negative for bladder incontinence, dysuria and flank pain.   Musculoskeletal: Positive for back pain.   Skin: Negative for rash.   Neurological: Negative for headaches and paresthesias.   Psychiatric/Behavioral: The patient is not nervous/anxious.    All other systems reviewed and are negative.      Past Medical History:   Diagnosis Date   • Arthritis    • Diabetes mellitus (CMS/HCC)    • Diarrhea    • Elevated cholesterol    • Hypertension    • Knee pain, left        No Known Allergies    Past Surgical History:   Procedure Laterality Date   • CHOLECYSTECTOMY     • LUMBAR DISCECTOMY Right 6/24/2021    Procedure: LAMINOTOMY, FORAMINOTOMY, DISCECTOMY, L3-4 RIGHT;  Surgeon: Sreekanth Sorto MD;  [facility]: [facility];  Service: Neurosurgery;  Laterality: Right;   • NECK SURGERY       May 2019: Believes it to be an ACDF, RAVINDRA Jackman       Family History   Problem Relation Age of Onset   • Diabetes Brother    • Hypertension Brother    • Diabetes Mother        Social History     Socioeconomic History   • Marital status: Single     Spouse name: Not on file   • Number of children: Not on file   • Years of education: Not on file   • Highest education level: Not on file   Tobacco Use   • Smoking status: Never Smoker   • Smokeless tobacco: Current User     Types: Snuff   Vaping Use   • Vaping Use: Never used   Substance and Sexual Activity   • Alcohol use: Not Currently     Comment: RARELY   • Drug use: No   • Sexual activity: Defer           Objective   Physical Exam  Vitals and nursing note reviewed.   Constitutional:       Appearance: He is well-developed.   HENT:      Head: Normocephalic.   Cardiovascular:      Rate and Rhythm: Normal rate and regular rhythm.   Pulmonary:      Effort: Pulmonary effort is normal.      Breath sounds: Normal breath sounds.   Abdominal:      General: Bowel sounds are normal.      Palpations: Abdomen is soft.      Tenderness: There is no abdominal tenderness.   Musculoskeletal:         General: Normal range of motion.      Cervical back: Neck supple.      Lumbar back: Tenderness present.   Skin:     General: Skin is warm and dry.   Neurological:      Mental Status: He is alert and oriented to person, place, and time.   Psychiatric:         Behavior: Behavior normal.         Thought Content: Thought content normal.         Judgment: Judgment normal.         Procedures           ED Course  ED Course as of Jul 13 0210   Sun Jul 11, 2021   1720 Discussed with patient his CAT scan results he states that he is feeling much better and will follow up with Dr. Hooper tomorrow.    [LC]      ED Course User Index  [LC] Allison Patel PA                                           [facility]    Final diagnoses:   Chronic midline low back pain without sciatica       ED  Disposition  ED Disposition     ED Disposition Condition Comment    Discharge Stable           Sreekanth Sorto MD  [address] [address]  [phone]    In 1 day  call in am         Medication List      No changes were made to your prescriptions during this visit.          Allison Patel PA  07/13/21 0210

## 2021-07-12 ENCOUNTER — TELEPHONE (OUTPATIENT)
Dept: NEUROSURGERY | Facility: CLINIC | Age: 41
End: 2021-07-12

## 2021-07-12 RX ORDER — METHYLPREDNISOLONE 4 MG/1
TABLET ORAL
Qty: 21 TABLET | Refills: 0 | Status: SHIPPED | OUTPATIENT
Start: 2021-07-12 | End: 2021-07-21

## 2021-07-12 NOTE — TELEPHONE ENCOUNTER
Spoke to patient and relayed PA's message. Patient plans to keep appointment on Friday with AIDE Tanner.

## 2021-07-12 NOTE — TELEPHONE ENCOUNTER
Provider:  Macario  Caller: family villeda  Time of call:  9:33   Phone #:  [phone]  Surgery:  Laminotomy foraminotomoy  Surgery Date:  6-24-21  Last visit: 6-9-21    Next visit: 7-16-21    CINDY:         Reason for call:  Patient's family member called and said he was in extreme pain. He went to the ER yesterday and got 1 mg of dilaudid and a Toradol injection 30 mg.  The patient said it is not helping. Please Advise.Thank you.    When did it start:since surgery    Where is it located:lower back16    How long has this been going on/is this new or the same as before surgery: 16 days it is a different pain and worse    Characteristics of symptom/severity: Throbbing pain severe    Timing- Is it constant or intermittent: constant    What makes it worse: nothing    What makes it better:laying down and being still    What therapies/medications have you tried:  7.5 Norco not helping

## 2021-07-12 NOTE — TELEPHONE ENCOUNTER
Please have him take a medrol dose pack   - we can discuss his progress when I see him in the office on Friday     Order placed and signed

## 2021-07-16 ENCOUNTER — LAB (OUTPATIENT)
Dept: LAB | Facility: HOSPITAL | Age: 41
End: 2021-07-16

## 2021-07-16 ENCOUNTER — OFFICE VISIT (OUTPATIENT)
Dept: NEUROSURGERY | Facility: CLINIC | Age: 41
End: 2021-07-16

## 2021-07-16 VITALS — BODY MASS INDEX: 35.21 KG/M2 | TEMPERATURE: 96.9 F | WEIGHT: 260 LBS | HEIGHT: 72 IN

## 2021-07-16 DIAGNOSIS — E66.01 MORBID (SEVERE) OBESITY DUE TO EXCESS CALORIES (HCC): ICD-10-CM

## 2021-07-16 DIAGNOSIS — M51.36 DDD (DEGENERATIVE DISC DISEASE), LUMBAR: ICD-10-CM

## 2021-07-16 DIAGNOSIS — M51.26 HNP (HERNIATED NUCLEUS PULPOSUS), LUMBAR: ICD-10-CM

## 2021-07-16 DIAGNOSIS — Z98.890 S/P LUMBAR DISCECTOMY: Primary | ICD-10-CM

## 2021-07-16 DIAGNOSIS — L24.A9 DRAINAGE FROM WOUND: ICD-10-CM

## 2021-07-16 LAB
BASOPHILS # BLD AUTO: 0.06 10*3/MM3 (ref 0–0.2)
BASOPHILS NFR BLD AUTO: 0.5 % (ref 0–1.5)
CRP SERPL-MCNC: 1.07 MG/DL (ref 0–0.5)
DEPRECATED RDW RBC AUTO: 40.6 FL (ref 37–54)
EOSINOPHIL # BLD AUTO: 0.23 10*3/MM3 (ref 0–0.4)
EOSINOPHIL NFR BLD AUTO: 1.8 % (ref 0.3–6.2)
ERYTHROCYTE [DISTWIDTH] IN BLOOD BY AUTOMATED COUNT: 12.2 % (ref 12.3–15.4)
ERYTHROCYTE [SEDIMENTATION RATE] IN BLOOD: 26 MM/HR (ref 0–15)
HCT VFR BLD AUTO: 42.9 % (ref 37.5–51)
HGB BLD-MCNC: 13.8 G/DL (ref 13–17.7)
IMM GRANULOCYTES # BLD AUTO: 0.05 10*3/MM3 (ref 0–0.05)
IMM GRANULOCYTES NFR BLD AUTO: 0.4 % (ref 0–0.5)
LYMPHOCYTES # BLD AUTO: 1.6 10*3/MM3 (ref 0.7–3.1)
LYMPHOCYTES NFR BLD AUTO: 12.8 % (ref 19.6–45.3)
MCH RBC QN AUTO: 29.2 PG (ref 26.6–33)
MCHC RBC AUTO-ENTMCNC: 32.2 G/DL (ref 31.5–35.7)
MCV RBC AUTO: 90.9 FL (ref 79–97)
MONOCYTES # BLD AUTO: 1.02 10*3/MM3 (ref 0.1–0.9)
MONOCYTES NFR BLD AUTO: 8.1 % (ref 5–12)
NEUTROPHILS NFR BLD AUTO: 76.4 % (ref 42.7–76)
NEUTROPHILS NFR BLD AUTO: 9.57 10*3/MM3 (ref 1.7–7)
NRBC BLD AUTO-RTO: 0 /100 WBC (ref 0–0.2)
PLATELET # BLD AUTO: 393 10*3/MM3 (ref 140–450)
PMV BLD AUTO: 10.3 FL (ref 6–12)
RBC # BLD AUTO: 4.72 10*6/MM3 (ref 4.14–5.8)
WBC # BLD AUTO: 12.53 10*3/MM3 (ref 3.4–10.8)

## 2021-07-16 PROCEDURE — 87186 SC STD MICRODIL/AGAR DIL: CPT | Performed by: PHYSICIAN ASSISTANT

## 2021-07-16 PROCEDURE — 99024 POSTOP FOLLOW-UP VISIT: CPT | Performed by: PHYSICIAN ASSISTANT

## 2021-07-16 PROCEDURE — 36415 COLL VENOUS BLD VENIPUNCTURE: CPT

## 2021-07-16 PROCEDURE — 85652 RBC SED RATE AUTOMATED: CPT | Performed by: PHYSICIAN ASSISTANT

## 2021-07-16 PROCEDURE — 87077 CULTURE AEROBIC IDENTIFY: CPT | Performed by: PHYSICIAN ASSISTANT

## 2021-07-16 PROCEDURE — 87205 SMEAR GRAM STAIN: CPT | Performed by: PHYSICIAN ASSISTANT

## 2021-07-16 PROCEDURE — 87070 CULTURE OTHR SPECIMN AEROBIC: CPT | Performed by: PHYSICIAN ASSISTANT

## 2021-07-16 PROCEDURE — 85025 COMPLETE CBC W/AUTO DIFF WBC: CPT

## 2021-07-16 PROCEDURE — 86140 C-REACTIVE PROTEIN: CPT

## 2021-07-16 RX ORDER — SULFAMETHOXAZOLE AND TRIMETHOPRIM 800; 160 MG/1; MG/1
1 TABLET ORAL 2 TIMES DAILY
Qty: 14 TABLET | Refills: 0 | Status: SHIPPED | OUTPATIENT
Start: 2021-07-16 | End: 2021-07-28 | Stop reason: SDUPTHER

## 2021-07-16 RX ORDER — FLUOXETINE HYDROCHLORIDE 40 MG/1
40 CAPSULE ORAL DAILY
COMMUNITY
Start: 2021-06-16

## 2021-07-16 RX ORDER — METFORMIN HYDROCHLORIDE 750 MG/1
750 TABLET, EXTENDED RELEASE ORAL 2 TIMES DAILY
COMMUNITY
Start: 2021-07-02

## 2021-07-16 RX ORDER — OXYCODONE AND ACETAMINOPHEN 7.5; 325 MG/1; MG/1
1 TABLET ORAL EVERY 8 HOURS PRN
Qty: 30 TABLET | Refills: 0 | Status: SHIPPED | OUTPATIENT
Start: 2021-07-16 | End: 2021-07-28

## 2021-07-16 NOTE — PROGRESS NOTES
Patient: Wenceslao Nava  : 1980  GENDER: male    Primary Care Provider: Flores Chua PA    Requesting Provider: As above      History    Chief Complaint:   1. Low back and right leg pain   2. 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  Preoperative studies revealed recess and foraminal narrowing on the right at L3-4 with a disc protrusion.  As such, patient presented for a right L3-4 discectomy 2021.    Presently, Mr. Nava is 3 weeks postop.  His recovery has been slow to progress.  He notes a 90% improvement in his right leg radicular complaints-however he continues to harbor a substantial amount of low back discomfort.  He also reports continuous wound drainage with 2-3 dressing changes daily.  He denies associated fever/chills, or positional headache.  On 2021, patient presented to [facility] ED and was discharged home with a short course of pain medicine.  Today he complains of severe low back pain with that is constant.  Activity has been challenging given his pain level.  He has been taking Norco 7.5's 3 times daily without lasting benefit.  He has no other complaints at this time.    Review of Systems   Constitutional: Negative for activity change, appetite change, chills, diaphoresis, fatigue, fever and unexpected weight change.   HENT: Negative for congestion, dental problem, drooling, ear discharge, ear pain, facial swelling, hearing loss, mouth sores, nosebleeds, postnasal drip, rhinorrhea, sinus pressure, sneezing, sore throat, tinnitus, trouble swallowing and voice change.    Eyes: Negative for photophobia, pain, discharge, redness, itching and visual disturbance.   Respiratory: Negative for apnea, cough, choking, chest tightness, shortness of breath, wheezing and stridor.    Cardiovascular: Negative for  chest pain, palpitations and leg swelling.   Gastrointestinal: Negative for abdominal distention, abdominal pain, anal bleeding, blood in stool, constipation, diarrhea, nausea, rectal pain and vomiting.   Endocrine: Negative for cold intolerance, heat intolerance, polydipsia, polyphagia and polyuria.   Genitourinary: Negative for decreased urine volume, difficulty urinating, dysuria, enuresis, flank pain, frequency, genital sores, hematuria and urgency.   Musculoskeletal: Positive for gait problem. Negative for arthralgias, back pain, joint swelling, myalgias, neck pain and neck stiffness.   Skin: Negative for color change, pallor, rash and wound.   Allergic/Immunologic: Negative for environmental allergies, food allergies and immunocompromised state.   Neurological: Negative for dizziness, tremors, seizures, syncope, facial asymmetry, speech difficulty, weakness, light-headedness, numbness and headaches.   Hematological: Negative for adenopathy. Does not bruise/bleed easily.   Psychiatric/Behavioral: Negative for agitation, behavioral problems, confusion, decreased concentration, dysphoric mood, hallucinations, self-injury, sleep disturbance and suicidal ideas. The patient is not nervous/anxious and is not hyperactive.    All other systems reviewed and are negative.      Past Medical History:   Diagnosis Date   • Arthritis    • Diabetes mellitus (CMS/HCC)    • Diarrhea    • Elevated cholesterol    • Hypertension    • Knee pain, left      Past Surgical History:   Procedure Laterality Date   • CHOLECYSTECTOMY     • LUMBAR DISCECTOMY Right 6/24/2021    Procedure: LAMINOTOMY, FORAMINOTOMY, DISCECTOMY, L3-4 RIGHT;  Surgeon: Sreekanth Sorto MD;  Location: [facility];  Service: Neurosurgery;  Laterality: Right;   • NECK SURGERY      May 2019: Believes it to be an ACDF, RAVINDRA Jackman       Current Outpatient Medications:   •  aspirin (aspirin) 81 MG EC tablet, Take 1 tablet by mouth Daily., Disp: 30 tablet,  "Rfl: 0  •  atorvastatin (LIPITOR) 20 MG tablet, Take 20 mg by mouth Daily., Disp: , Rfl:   •  Dulaglutide (Trulicity) 0.75 MG/0.5ML solution pen-injector, Inject 0.75 mg under the skin into the appropriate area as directed 1 (One) Time Per Week. THURSDAY, Disp: , Rfl:   •  gabapentin (NEURONTIN) 400 MG capsule, Take 400 mg by mouth 3 (Three) Times a Day., Disp: , Rfl:   •  HYDROcodone-acetaminophen (NORCO) 7.5-325 MG per tablet, Take 1 tablet by mouth 3 (Three) Times a Day., Disp: 20 tablet, Rfl: 0  •  lisinopril-hydrochlorothiazide (PRINZIDE,ZESTORETIC) 10-12.5 MG per tablet, Take 1 tablet by mouth Daily., Disp: 15 tablet, Rfl: 0  •  meloxicam (MOBIC) 15 MG tablet, Take 15 mg by mouth Daily., Disp: , Rfl:   •  methylPREDNISolone (MEDROL) 4 MG dose pack, Take as directed on package instructions., Disp: 21 tablet, Rfl: 0  •  nabumetone (RELAFEN) 500 MG tablet, Take 500 mg by mouth 2 (Two) Times a Day As Needed for Mild Pain ., Disp: , Rfl:   •  FLUoxetine (PROzac) 40 MG capsule, , Disp: , Rfl:   •  metFORMIN ER (GLUCOPHAGE-XR) 750 MG 24 hr tablet, , Disp: , Rfl:   •  sulfamethoxazole-trimethoprim (Bactrim DS) 800-160 MG per tablet, Take 1 tablet by mouth 2 (Two) Times a Day., Disp: 14 tablet, Rfl: 0    No Known Allergies    The patient's review of systems, past medical history, past surgical history, family history, and social history have been reviewed at length in the electronic medical record.    Physical Exam:   Temp 96.9 °F (36.1 °C) (Infrared)   Ht 182.9 cm (72\")   Wt 118 kg (260 lb)   BMI 35.26 kg/m²   Consitutional: A&Ox3, pleasant, no acute distress  Skin:   - Healing surgical incision   -There is a small opening to the inferior pole of his lumbar incision   - NSOI   - Non-TTP  -There is some fluctuance beneath the incision line.  With light palpation, I was able to express a very scant amount of serosanguineous drainage.  Again, this does not appear overtly infectious in nature or reminiscent of CSF.  "     Patient's Body mass index is 35.26 kg/m². indicating that he is morbidly obese (BMI > 40 or > 35 with obesity - related health condition). Obesity-related health conditions include the following: hypertension, diabetes mellitus and dyslipidemias. Obesity is unchanged. BMI is is above average; BMI management plan is completed. We discussed portion control and increasing exercise..         Medical Decision Making    Data Review:   1. CT Lumbar Spine (7/11/2021):  Independent review of radiographic imaging reveals expected postsurgical changes with laminotomy defect rightward at L3-4.  It is difficult to discern, however there could be some fluid proximal to the operative bed.    Diagnosis/Treatment Options:  1. S/P lumbar discectomy  2. HNP (herniated nucleus pulposus), lumbar  3. DDD (degenerative disc disease), lumbar  4. Morbid (severe) obesity due to excess calories (CMS/HCC)  5. Drainage from wound    - CBC & Differential; Future  - Sedimentation Rate  - C-reactive Protein; Future  - Wound Culture - Surgical Site, Back, Lower       Follow up:  Mr. Nava is seen today in follow-up 3 weeks after undergoing uncomplicated right L3-4 discectomy on 6/24/2021.  His postoperative recovery has been complicated by a substantial amount of low back discomfort and wound drainage.  Fortunately his right leg radicular symptoms have improved.  I cultured his lumbar site as well as sent him for baseline inflammatory markers.  He will start a short course of Bactrim DS twice daily x7 days and plan to follow-up in the office next week for a wound check.  I additionally refilled his pain medicine and switched him to Percocet 7.5's 3 times daily.    Flores Parker PA-C   7/16/2021   09:50 EDT

## 2021-07-16 NOTE — PATIENT INSTRUCTIONS

## 2021-07-19 LAB
BACTERIA SPEC AEROBE CULT: ABNORMAL
GRAM STN SPEC: ABNORMAL
GRAM STN SPEC: ABNORMAL

## 2021-07-19 RX ORDER — DOXYCYCLINE HYCLATE 100 MG/1
100 CAPSULE ORAL 2 TIMES DAILY
Qty: 20 CAPSULE | Refills: 0 | Status: SHIPPED | OUTPATIENT
Start: 2021-07-19 | End: 2021-07-28 | Stop reason: SDUPTHER

## 2021-07-20 ENCOUNTER — TELEPHONE (OUTPATIENT)
Dept: NEUROSURGERY | Facility: CLINIC | Age: 41
End: 2021-07-20

## 2021-07-20 NOTE — TELEPHONE ENCOUNTER
Called to inform patient of lab results and that we added a new antibiotic.  -He was thankful for the call and feels better.  He will keep his planned follow-up tomorrow.

## 2021-07-21 ENCOUNTER — OFFICE VISIT (OUTPATIENT)
Dept: NEUROSURGERY | Facility: CLINIC | Age: 41
End: 2021-07-21

## 2021-07-21 ENCOUNTER — TELEPHONE (OUTPATIENT)
Dept: NEUROSURGERY | Facility: CLINIC | Age: 41
End: 2021-07-21

## 2021-07-21 VITALS — TEMPERATURE: 96.6 F | BODY MASS INDEX: 35.21 KG/M2 | WEIGHT: 260 LBS | HEIGHT: 72 IN

## 2021-07-21 DIAGNOSIS — M51.26 HNP (HERNIATED NUCLEUS PULPOSUS), LUMBAR: ICD-10-CM

## 2021-07-21 DIAGNOSIS — Z98.890 S/P LUMBAR DISCECTOMY: ICD-10-CM

## 2021-07-21 DIAGNOSIS — Z51.89 VISIT FOR WOUND CHECK: Primary | ICD-10-CM

## 2021-07-21 DIAGNOSIS — L24.A9 DRAINAGE FROM WOUND: ICD-10-CM

## 2021-07-21 DIAGNOSIS — M51.36 DDD (DEGENERATIVE DISC DISEASE), LUMBAR: ICD-10-CM

## 2021-07-21 DIAGNOSIS — E66.01 MORBID (SEVERE) OBESITY DUE TO EXCESS CALORIES (HCC): ICD-10-CM

## 2021-07-21 PROCEDURE — 99024 POSTOP FOLLOW-UP VISIT: CPT | Performed by: PHYSICIAN ASSISTANT

## 2021-07-21 RX ORDER — DOCUSATE SODIUM 100 MG/1
100 CAPSULE, LIQUID FILLED ORAL 3 TIMES DAILY PRN
Qty: 30 CAPSULE | Refills: 0 | Status: SHIPPED | OUTPATIENT
Start: 2021-07-21 | End: 2023-03-01

## 2021-07-21 NOTE — TELEPHONE ENCOUNTER
Pt will see Aviva on 7/27/21 2pm.  Labs will need to be done prior to visit.  Attempted to contact patient voicemail full.

## 2021-07-21 NOTE — PROGRESS NOTES
Patient: Wenceslao Nava  : 1980  GENDER: male    Primary Care Provider: Flores Chua PA    Requesting Provider: As above      History    Chief Complaint:   1. Low back and right leg pain   2. 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  Preoperative studies revealed recess and foraminal narrowing on the right at L3-4 with a disc protrusion.  As such, patient presented for a right L3-4 discectomy 2021.     Presently, Mr. Nava is 4 weeks postop.  His recovery was complicated by a modest amount of clear drainage - without associated positional headache.  Baseline inflammatory markers were elevated and wound culture revealed light growth (2+) staphylococcus epidermidis.  He was started on dual antibiotic therapy: Bactrim DS + doxycycline BID x10 days.  Since last seen in the office his wound drainage has dramatically decreased.  He continues to deny associated fever/chills, nausea or vomiting, or positional headache.  He continues to complain of chronic low back and right leg pain.  At his last visit he noted 90% improvement in his right leg radicular complaints.  He was transitioned from Norco 7.5 to Percocet 7.5's and is improving.  He has no other complaints at this time.     Review of Systems   Constitutional: Negative for activity change, appetite change, chills, diaphoresis, fatigue, fever and unexpected weight change.   HENT: Negative for congestion, dental problem, drooling, ear discharge, ear pain, facial swelling, hearing loss, mouth sores, nosebleeds, postnasal drip, rhinorrhea, sinus pressure, sneezing, sore throat, tinnitus, trouble swallowing and voice change.    Eyes: Negative for photophobia, pain, discharge, redness, itching and visual disturbance.   Respiratory: Negative for apnea, cough, choking, chest  tightness, shortness of breath, wheezing and stridor.    Cardiovascular: Negative for chest pain, palpitations and leg swelling.   Gastrointestinal: Negative for abdominal distention, abdominal pain, anal bleeding, blood in stool, constipation, diarrhea, nausea, rectal pain and vomiting.   Endocrine: Negative for cold intolerance, heat intolerance, polydipsia, polyphagia and polyuria.   Genitourinary: Negative for decreased urine volume, difficulty urinating, dysuria, enuresis, flank pain, frequency, genital sores, hematuria and urgency.   Musculoskeletal: Positive for gait problem. Negative for arthralgias, back pain, joint swelling, myalgias, neck pain and neck stiffness.   Skin: Negative for color change, pallor, rash and wound.   Allergic/Immunologic: Negative for environmental allergies, food allergies and immunocompromised state.   Neurological: Negative for dizziness, tremors, seizures, syncope, facial asymmetry, speech difficulty, weakness, light-headedness, numbness and headaches.   Hematological: Negative for adenopathy. Does not bruise/bleed easily.   Psychiatric/Behavioral: Negative for agitation, behavioral problems, confusion, decreased concentration, dysphoric mood, hallucinations, self-injury, sleep disturbance and suicidal ideas. The patient is not nervous/anxious and is not hyperactive.    All other systems reviewed and are negative.      Past Medical History:   Diagnosis Date   • Arthritis    • Diabetes mellitus (CMS/HCC)    • Diarrhea    • Elevated cholesterol    • Hypertension    • Knee pain, left      Past Surgical History:   Procedure Laterality Date   • CHOLECYSTECTOMY     • LUMBAR DISCECTOMY Right 6/24/2021    Procedure: LAMINOTOMY, FORAMINOTOMY, DISCECTOMY, L3-4 RIGHT;  Surgeon: Sreekanth Sorto MD;  Location: [facility];  Service: Neurosurgery;  Laterality: Right;   • NECK SURGERY      May 2019: Believes it to be an ACDF, RAVINDRA Jackman       Current Outpatient Medications:   •   aspirin (aspirin) 81 MG EC tablet, Take 1 tablet by mouth Daily., Disp: 30 tablet, Rfl: 0  •  atorvastatin (LIPITOR) 20 MG tablet, Take 20 mg by mouth Daily., Disp: , Rfl:   •  doxycycline (VIBRAMYCIN) 100 MG capsule, Take 1 capsule by mouth 2 (Two) Times a Day., Disp: 20 capsule, Rfl: 0  •  Dulaglutide (Trulicity) 0.75 MG/0.5ML solution pen-injector, Inject 0.75 mg under the skin into the appropriate area as directed 1 (One) Time Per Week. THURSDAY, Disp: , Rfl:   •  FLUoxetine (PROzac) 40 MG capsule, , Disp: , Rfl:   •  gabapentin (NEURONTIN) 400 MG capsule, Take 400 mg by mouth 3 (Three) Times a Day., Disp: , Rfl:   •  lisinopril-hydrochlorothiazide (PRINZIDE,ZESTORETIC) 10-12.5 MG per tablet, Take 1 tablet by mouth Daily., Disp: 15 tablet, Rfl: 0  •  meloxicam (MOBIC) 15 MG tablet, Take 15 mg by mouth Daily., Disp: , Rfl:   •  metFORMIN ER (GLUCOPHAGE-XR) 750 MG 24 hr tablet, , Disp: , Rfl:   •  nabumetone (RELAFEN) 500 MG tablet, Take 500 mg by mouth 2 (Two) Times a Day As Needed for Mild Pain ., Disp: , Rfl:   •  oxyCODONE-acetaminophen (PERCOCET) 7.5-325 MG per tablet, Take 1 tablet by mouth Every 8 (Eight) Hours As Needed for Severe Pain ., Disp: 30 tablet, Rfl: 0  •  sulfamethoxazole-trimethoprim (Bactrim DS) 800-160 MG per tablet, Take 1 tablet by mouth 2 (Two) Times a Day., Disp: 14 tablet, Rfl: 0  •  docusate sodium (Colace) 100 MG capsule, Take 1 capsule by mouth 3 (Three) Times a Day As Needed for Constipation., Disp: 30 capsule, Rfl: 0  •  HYDROcodone-acetaminophen (NORCO) 7.5-325 MG per tablet, Take 1 tablet by mouth 3 (Three) Times a Day., Disp: 20 tablet, Rfl: 0  •  magnesium citrate solution, Take 296 mL by mouth 1 (One) Time for 1 dose., Disp: 296 mL, Rfl: 0    No Known Allergies    The patient's review of systems, past medical history, past surgical history, family history, and social history have been reviewed at length in the electronic medical record.    Physical Exam:   Temp 96.6 °F (35.9  "°C) (Infrared)   Ht 182.9 cm (72\")   Wt 118 kg (260 lb)   BMI 35.26 kg/m²   Consitutional: A&Ox3, pleasant, no acute distress  Skin:   - Healing surgical incision   -There is a small opening to the inferior pole of his lumbar incision, + fibrinous plaque    - NSOI   - Non-TTP  - No fluctuance beneath the incision line.  With light palpation, I was unable to express any drainage - improved from last visit.        Patient's Body mass index is 35.26 kg/m². indicating that he is morbidly obese (BMI > 40 or > 35 with obesity - related health condition). Obesity-related health conditions include the following: hypertension, diabetes mellitus and dyslipidemias. Obesity is unchanged. BMI is is above average; BMI management plan is completed. We discussed portion control and increasing exercise..         Medical Decision Making    Data Review:   1. CT Lumbar Spine (7/11/2021):  Independent review of radiographic imaging reveals expected postsurgical changes with laminotomy defect rightward at L3-4.  It is difficult to discern, however there could be some fluid proximal to the operative bed.    2. Wound Culture (7/16/2021):  Light growth (2+) Staphylococcus epidermidis     LABS (7/16/2021):  - WBC: 12.53  - CRP: 1.07  - Sed Rate: 26    Diagnosis/Treatment Options:  1. Visit for wound check  2. S/P lumbar discectomy  3. HNP (herniated nucleus pulposus), lumbar  4. DDD (degenerative disc disease), lumbar  5. Drainage from wound  6. Morbid (severe) obesity due to excess calories (CMS/[facility])         Follow up:  Mr. Nava is seen today in follow-up 4 weeks after undergoing an uncomplicated right L3-4 discectomy on 6/24/2021.  His postoperative recovery has been complicated by wound drainage, with cultures revealing light growth (2+) Staphylococcus epidermidis.  He was started on dual antibiotic therapy and his drainage has improved.  He will continue to observe and will plan to follow-up in the office in 1 week for wound " check.  He will obtain new baseline laboratories to check on progress prior to his visit.      Flores Parker PA-C   7/21/2021   12:27 EDT

## 2021-07-21 NOTE — PATIENT INSTRUCTIONS

## 2021-07-27 ENCOUNTER — OFFICE VISIT (OUTPATIENT)
Dept: NEUROSURGERY | Facility: CLINIC | Age: 41
End: 2021-07-27

## 2021-07-27 ENCOUNTER — LAB (OUTPATIENT)
Dept: LAB | Facility: HOSPITAL | Age: 41
End: 2021-07-27

## 2021-07-27 VITALS
WEIGHT: 265 LBS | DIASTOLIC BLOOD PRESSURE: 70 MMHG | HEIGHT: 72 IN | TEMPERATURE: 97.3 F | SYSTOLIC BLOOD PRESSURE: 104 MMHG | BODY MASS INDEX: 35.89 KG/M2

## 2021-07-27 DIAGNOSIS — L24.A9 DRAINAGE FROM WOUND: ICD-10-CM

## 2021-07-27 DIAGNOSIS — Z51.89 VISIT FOR WOUND CHECK: ICD-10-CM

## 2021-07-27 DIAGNOSIS — G89.29 CHRONIC BILATERAL LOW BACK PAIN WITH BILATERAL SCIATICA: Primary | ICD-10-CM

## 2021-07-27 DIAGNOSIS — M54.42 CHRONIC BILATERAL LOW BACK PAIN WITH BILATERAL SCIATICA: Primary | ICD-10-CM

## 2021-07-27 DIAGNOSIS — M54.41 CHRONIC BILATERAL LOW BACK PAIN WITH BILATERAL SCIATICA: Primary | ICD-10-CM

## 2021-07-27 LAB
BASOPHILS # BLD AUTO: 0.05 10*3/MM3 (ref 0–0.2)
BASOPHILS NFR BLD AUTO: 0.7 % (ref 0–1.5)
CRP SERPL-MCNC: 1.65 MG/DL (ref 0–0.5)
DEPRECATED RDW RBC AUTO: 37.8 FL (ref 37–54)
EOSINOPHIL # BLD AUTO: 0.29 10*3/MM3 (ref 0–0.4)
EOSINOPHIL NFR BLD AUTO: 4.3 % (ref 0.3–6.2)
ERYTHROCYTE [DISTWIDTH] IN BLOOD BY AUTOMATED COUNT: 11.8 % (ref 12.3–15.4)
ERYTHROCYTE [SEDIMENTATION RATE] IN BLOOD: 27 MM/HR (ref 0–15)
HCT VFR BLD AUTO: 37.9 % (ref 37.5–51)
HGB BLD-MCNC: 12.6 G/DL (ref 13–17.7)
IMM GRANULOCYTES # BLD AUTO: 0.01 10*3/MM3 (ref 0–0.05)
IMM GRANULOCYTES NFR BLD AUTO: 0.1 % (ref 0–0.5)
LYMPHOCYTES # BLD AUTO: 1.65 10*3/MM3 (ref 0.7–3.1)
LYMPHOCYTES NFR BLD AUTO: 24.4 % (ref 19.6–45.3)
MCH RBC QN AUTO: 29.1 PG (ref 26.6–33)
MCHC RBC AUTO-ENTMCNC: 33.2 G/DL (ref 31.5–35.7)
MCV RBC AUTO: 87.5 FL (ref 79–97)
MONOCYTES # BLD AUTO: 0.53 10*3/MM3 (ref 0.1–0.9)
MONOCYTES NFR BLD AUTO: 7.9 % (ref 5–12)
NEUTROPHILS NFR BLD AUTO: 4.22 10*3/MM3 (ref 1.7–7)
NEUTROPHILS NFR BLD AUTO: 62.6 % (ref 42.7–76)
NRBC BLD AUTO-RTO: 0 /100 WBC (ref 0–0.2)
PLATELET # BLD AUTO: 353 10*3/MM3 (ref 140–450)
PMV BLD AUTO: 10.3 FL (ref 6–12)
RBC # BLD AUTO: 4.33 10*6/MM3 (ref 4.14–5.8)
WBC # BLD AUTO: 6.75 10*3/MM3 (ref 3.4–10.8)

## 2021-07-27 PROCEDURE — 86140 C-REACTIVE PROTEIN: CPT

## 2021-07-27 PROCEDURE — 36415 COLL VENOUS BLD VENIPUNCTURE: CPT

## 2021-07-27 PROCEDURE — 85652 RBC SED RATE AUTOMATED: CPT

## 2021-07-27 PROCEDURE — 85025 COMPLETE CBC W/AUTO DIFF WBC: CPT

## 2021-07-27 PROCEDURE — 99024 POSTOP FOLLOW-UP VISIT: CPT | Performed by: PHYSICIAN ASSISTANT

## 2021-07-27 RX ORDER — CYCLOBENZAPRINE HCL 10 MG
10 TABLET ORAL 3 TIMES DAILY PRN
Qty: 60 TABLET | Refills: 0 | Status: SHIPPED | OUTPATIENT
Start: 2021-07-27 | End: 2021-08-18

## 2021-07-27 NOTE — PROGRESS NOTES
Patient: Wenceslao Nava  : 1980  Gender: male    Primary Care Provider: Flores Chua PA    Requesting Provider:  As above    Chief Complaint: Low back and right leg pain; wound drainage.    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 His postoperative course has been complicated by wound drainage. Postoperative cultures revealed right growth staph epidermidis. He has been on Bactrim and Doxycycline. He presents today for a wound check.    Presently, patient states that he continues to have a fair amount of pain. Pain is most severe in his low back, however he continues to have pain in the right leg as well. His medication was increased to percocet 7.5 which he states helps. He requests a refill today. He also takes gabapentin 400 TID. He denies fever, chills or other infectious symptoms. Patient and his wife report continued wound drainage that has been steady since his last visit.       Past Medical and Surgical History:  Past Medical History:   Diagnosis Date   • Arthritis    • Diabetes mellitus (CMS/HCC)    • Diarrhea    • Elevated cholesterol    • Hypertension    • Knee pain, left      Past Surgical History:   Procedure Laterality Date   • CHOLECYSTECTOMY     • LUMBAR DISCECTOMY Right 2021    Procedure: LAMINOTOMY, FORAMINOTOMY, DISCECTOMY, L3-4 RIGHT;  Surgeon: Sreekanth Sorto MD;  Location: [facility];  Service: Neurosurgery;  Laterality: Right;   • NECK SURGERY      May 2019: Believes it to be an ACDF, RAVINDRA Jackman       Current Medications:    Current Outpatient Medications:   •  aspirin (aspirin) 81 MG EC tablet, Take 1 tablet by mouth Daily., Disp: 30 tablet, Rfl: 0  •  atorvastatin (LIPITOR) 20 MG tablet, Take 20 mg by mouth Daily., Disp: , Rfl:   •  docusate sodium (Colace) 100 MG capsule, Take 1 capsule by mouth 3 (Three) Times a Day As Needed for Constipation.,  Disp: 30 capsule, Rfl: 0  •  doxycycline (VIBRAMYCIN) 100 MG capsule, Take 1 capsule by mouth 2 (Two) Times a Day., Disp: 20 capsule, Rfl: 0  •  Dulaglutide (Trulicity) 0.75 MG/0.5ML solution pen-injector, Inject 0.75 mg under the skin into the appropriate area as directed 1 (One) Time Per Week. THURSDAY, Disp: , Rfl:   •  FLUoxetine (PROzac) 40 MG capsule, , Disp: , Rfl:   •  gabapentin (NEURONTIN) 400 MG capsule, Take 400 mg by mouth 3 (Three) Times a Day., Disp: , Rfl:   •  HYDROcodone-acetaminophen (NORCO) 7.5-325 MG per tablet, Take 1 tablet by mouth 3 (Three) Times a Day., Disp: 20 tablet, Rfl: 0  •  lisinopril-hydrochlorothiazide (PRINZIDE,ZESTORETIC) 10-12.5 MG per tablet, Take 1 tablet by mouth Daily., Disp: 15 tablet, Rfl: 0  •  meloxicam (MOBIC) 15 MG tablet, Take 15 mg by mouth Daily., Disp: , Rfl:   •  metFORMIN ER (GLUCOPHAGE-XR) 750 MG 24 hr tablet, , Disp: , Rfl:   •  nabumetone (RELAFEN) 500 MG tablet, Take 500 mg by mouth 2 (Two) Times a Day As Needed for Mild Pain ., Disp: , Rfl:   •  oxyCODONE-acetaminophen (PERCOCET) 7.5-325 MG per tablet, Take 1 tablet by mouth Every 8 (Eight) Hours As Needed for Severe Pain ., Disp: 30 tablet, Rfl: 0  •  sulfamethoxazole-trimethoprim (Bactrim DS) 800-160 MG per tablet, Take 1 tablet by mouth 2 (Two) Times a Day., Disp: 14 tablet, Rfl: 0  •  cyclobenzaprine (FLEXERIL) 10 MG tablet, Take 1 tablet by mouth 3 (Three) Times a Day As Needed for Muscle Spasms., Disp: 60 tablet, Rfl: 0    Allergies:  No Known Allergies      Review of Systems   Constitutional: Negative.    HENT: Negative.    Eyes: Negative.    Respiratory: Negative.    Cardiovascular: Negative.    Gastrointestinal: Negative.    Endocrine: Negative.    Genitourinary: Negative.    Musculoskeletal: Positive for back pain and gait problem.   Skin: Negative.    Allergic/Immunologic: Negative.    Hematological: Negative.    Psychiatric/Behavioral: Negative.          Physical Exam  Patient is alert, oriented  "and in no apparent distress.   He moves about comfortably in a wheelchair.   Strength intact in his lower extremities.  A small opening remains in the inferior pole of the incision. The remaining areas of the incision well-healed. With palpation of the surrounding tissue I was able to express a fair amount of serous fluid from the opening where there was a small piece of vicryl suture that I removed. The fluid was not purulent in nature. There was no significant tenderness with palpation of this area.    Vitals:    07/27/21 1306   BP: 104/70   BP Location: Right arm   Patient Position: Sitting   Cuff Size: Adult   Temp: 97.3 °F (36.3 °C)   Weight: 120 kg (265 lb)   Height: 182.9 cm (72\")       Patient's Body mass index is 35.94 kg/m². indicating that he is obese (BMI >30).     Lab Review:  WBC: 6.75   ESR: 27  CRP: 1.65    Assessment:  1.  S/p lumbar discectomy  2.  Wound drainage    Plan:  Mr. Nava is seen today approximately 4 weeks out from an uncomplicated right L3-4 discectomy.  We have been following him closely for wound drainage with cultures positive for staph epidermidis.  He continues taking Bactrim and doxycycline at this time.  Upon further examination today I was able to express quite a bit of serous fluid from his incision.  The drainage was nonpurulent in nature in the other areas of the incision are well-healed.  Patient denies further fever, chills or other infectious symptoms.  His lab results are stable today with improvement of WBC count.  We will arrange for another wound check next week to assess his progress.  I discussed wound care with patient and his wife at this time.  He will call with any concerns or worsening symptoms prior to next visit.      Aviva Hart PA-C  "

## 2021-07-28 DIAGNOSIS — M51.26 HNP (HERNIATED NUCLEUS PULPOSUS), LUMBAR: Primary | ICD-10-CM

## 2021-07-28 RX ORDER — SULFAMETHOXAZOLE AND TRIMETHOPRIM 800; 160 MG/1; MG/1
1 TABLET ORAL 2 TIMES DAILY
Qty: 20 TABLET | Refills: 0 | Status: ON HOLD | OUTPATIENT
Start: 2021-07-28 | End: 2021-08-09

## 2021-07-28 RX ORDER — DOXYCYCLINE HYCLATE 100 MG/1
100 CAPSULE ORAL 2 TIMES DAILY
Qty: 20 CAPSULE | Refills: 0 | Status: SHIPPED | OUTPATIENT
Start: 2021-07-28 | End: 2021-08-06

## 2021-07-28 NOTE — TELEPHONE ENCOUNTER
Patient is s/p L3-4 right discectomy on 6/24/21. We are following him for wound drainage (currently on doxycycline+bactrim with improvement).    He requested a refill of Percocet. Refill pended for step down dosage of 5/325 mg if approved.

## 2021-07-29 RX ORDER — OXYCODONE HYDROCHLORIDE AND ACETAMINOPHEN 5; 325 MG/1; MG/1
1 TABLET ORAL EVERY 8 HOURS PRN
Qty: 30 TABLET | Refills: 0 | Status: SHIPPED | OUTPATIENT
Start: 2021-07-29 | End: 2021-08-06 | Stop reason: SDUPTHER

## 2021-08-04 ENCOUNTER — LAB (OUTPATIENT)
Dept: LAB | Facility: HOSPITAL | Age: 41
End: 2021-08-04

## 2021-08-04 ENCOUNTER — OFFICE VISIT (OUTPATIENT)
Dept: NEUROSURGERY | Facility: CLINIC | Age: 41
End: 2021-08-04

## 2021-08-04 VITALS
HEART RATE: 76 BPM | DIASTOLIC BLOOD PRESSURE: 90 MMHG | RESPIRATION RATE: 18 BRPM | WEIGHT: 265 LBS | OXYGEN SATURATION: 98 % | BODY MASS INDEX: 35.89 KG/M2 | HEIGHT: 72 IN | SYSTOLIC BLOOD PRESSURE: 138 MMHG

## 2021-08-04 DIAGNOSIS — M51.36 DDD (DEGENERATIVE DISC DISEASE), LUMBAR: Primary | ICD-10-CM

## 2021-08-04 DIAGNOSIS — M51.36 DDD (DEGENERATIVE DISC DISEASE), LUMBAR: ICD-10-CM

## 2021-08-04 LAB
BASOPHILS # BLD AUTO: 0.03 10*3/MM3 (ref 0–0.2)
BASOPHILS NFR BLD AUTO: 0.4 % (ref 0–1.5)
CRP SERPL-MCNC: 2.33 MG/DL (ref 0–0.5)
DEPRECATED RDW RBC AUTO: 37.2 FL (ref 37–54)
EOSINOPHIL # BLD AUTO: 0.23 10*3/MM3 (ref 0–0.4)
EOSINOPHIL NFR BLD AUTO: 3.1 % (ref 0.3–6.2)
ERYTHROCYTE [DISTWIDTH] IN BLOOD BY AUTOMATED COUNT: 11.9 % (ref 12.3–15.4)
ERYTHROCYTE [SEDIMENTATION RATE] IN BLOOD: 42 MM/HR (ref 0–15)
HCT VFR BLD AUTO: 36.7 % (ref 37.5–51)
HGB BLD-MCNC: 12.6 G/DL (ref 13–17.7)
IMM GRANULOCYTES # BLD AUTO: 0.02 10*3/MM3 (ref 0–0.05)
IMM GRANULOCYTES NFR BLD AUTO: 0.3 % (ref 0–0.5)
LYMPHOCYTES # BLD AUTO: 0.94 10*3/MM3 (ref 0.7–3.1)
LYMPHOCYTES NFR BLD AUTO: 12.6 % (ref 19.6–45.3)
MCH RBC QN AUTO: 29.5 PG (ref 26.6–33)
MCHC RBC AUTO-ENTMCNC: 34.3 G/DL (ref 31.5–35.7)
MCV RBC AUTO: 85.9 FL (ref 79–97)
MONOCYTES # BLD AUTO: 0.52 10*3/MM3 (ref 0.1–0.9)
MONOCYTES NFR BLD AUTO: 7 % (ref 5–12)
NEUTROPHILS NFR BLD AUTO: 5.74 10*3/MM3 (ref 1.7–7)
NEUTROPHILS NFR BLD AUTO: 76.6 % (ref 42.7–76)
NRBC BLD AUTO-RTO: 0 /100 WBC (ref 0–0.2)
PLATELET # BLD AUTO: 334 10*3/MM3 (ref 140–450)
PMV BLD AUTO: 10.1 FL (ref 6–12)
RBC # BLD AUTO: 4.27 10*6/MM3 (ref 4.14–5.8)
WBC # BLD AUTO: 7.48 10*3/MM3 (ref 3.4–10.8)

## 2021-08-04 PROCEDURE — 85652 RBC SED RATE AUTOMATED: CPT

## 2021-08-04 PROCEDURE — 99024 POSTOP FOLLOW-UP VISIT: CPT | Performed by: PHYSICIAN ASSISTANT

## 2021-08-04 PROCEDURE — 36415 COLL VENOUS BLD VENIPUNCTURE: CPT

## 2021-08-04 PROCEDURE — 85025 COMPLETE CBC W/AUTO DIFF WBC: CPT

## 2021-08-04 PROCEDURE — 86140 C-REACTIVE PROTEIN: CPT

## 2021-08-04 NOTE — PROGRESS NOTES
Patient: Wenceslao Nava  : 1980  Gender: male    Primary Care Provider: Flores Chua PA    Chief Complaint: Low back and right leg pain; wound drainage.    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 His postoperative course has been complicated by wound drainage. Postoperative cultures revealed light growth staph epidermidis. He continues on Bactrim and Doxycycline. He presents today for a wound check.     Presently, pt states that his pain is about the same. He continues to have pain in his back and RLE. He reports ongoing wound drainage that is non-purulent in nature. He denies fever, chills or other infectious symptoms today.      Past Medical and Surgical History:  Past Medical History:   Diagnosis Date   • Arthritis    • Diabetes mellitus (CMS/HCC)    • Diarrhea    • Elevated cholesterol    • Hypertension    • Knee pain, left      Past Surgical History:   Procedure Laterality Date   • CHOLECYSTECTOMY     • LUMBAR DISCECTOMY Right 2021    Procedure: LAMINOTOMY, FORAMINOTOMY, DISCECTOMY, L3-4 RIGHT;  Surgeon: Sreekanth Sorto MD;  Location: [facility];  Service: Neurosurgery;  Laterality: Right;   • NECK SURGERY      May 2019: Believes it to be an ACDF, RAVINDRA Jackman       Current Medications:    Current Outpatient Medications:   •  aspirin (aspirin) 81 MG EC tablet, Take 1 tablet by mouth Daily., Disp: 30 tablet, Rfl: 0  •  atorvastatin (LIPITOR) 20 MG tablet, Take 20 mg by mouth Daily., Disp: , Rfl:   •  cyclobenzaprine (FLEXERIL) 10 MG tablet, Take 1 tablet by mouth 3 (Three) Times a Day As Needed for Muscle Spasms., Disp: 60 tablet, Rfl: 0  •  docusate sodium (Colace) 100 MG capsule, Take 1 capsule by mouth 3 (Three) Times a Day As Needed for Constipation., Disp: 30 capsule, Rfl: 0  •  doxycycline (VIBRAMYCIN) 100 MG capsule, Take 1 capsule by mouth 2 (Two) Times a Day.,  Disp: 20 capsule, Rfl: 0  •  Dulaglutide (Trulicity) 0.75 MG/0.5ML solution pen-injector, Inject 0.75 mg under the skin into the appropriate area as directed 1 (One) Time Per Week. THURSDAY, Disp: , Rfl:   •  FLUoxetine (PROzac) 40 MG capsule, , Disp: , Rfl:   •  gabapentin (NEURONTIN) 400 MG capsule, Take 400 mg by mouth 3 (Three) Times a Day., Disp: , Rfl:   •  HYDROcodone-acetaminophen (NORCO) 7.5-325 MG per tablet, Take 1 tablet by mouth 3 (Three) Times a Day., Disp: 20 tablet, Rfl: 0  •  lisinopril-hydrochlorothiazide (PRINZIDE,ZESTORETIC) 10-12.5 MG per tablet, Take 1 tablet by mouth Daily., Disp: 15 tablet, Rfl: 0  •  meloxicam (MOBIC) 15 MG tablet, Take 15 mg by mouth Daily., Disp: , Rfl:   •  metFORMIN ER (GLUCOPHAGE-XR) 750 MG 24 hr tablet, , Disp: , Rfl:   •  nabumetone (RELAFEN) 500 MG tablet, Take 500 mg by mouth 2 (Two) Times a Day As Needed for Mild Pain ., Disp: , Rfl:   •  oxyCODONE-acetaminophen (PERCOCET) 5-325 MG per tablet, Take 1 tablet by mouth Every 8 (Eight) Hours As Needed for Severe Pain ., Disp: 30 tablet, Rfl: 0  •  sulfamethoxazole-trimethoprim (Bactrim DS) 800-160 MG per tablet, Take 1 tablet by mouth 2 (Two) Times a Day., Disp: 20 tablet, Rfl: 0    Allergies:  No Known Allergies      Review of Systems   Constitutional: Negative.    HENT: Negative.    Eyes: Negative.    Respiratory: Negative.    Cardiovascular: Negative.    Gastrointestinal: Negative.    Endocrine: Negative.    Genitourinary: Negative.    Musculoskeletal: Positive for arthralgias and back pain.   Skin: Negative.    Allergic/Immunologic: Negative.    Neurological: Negative.    Hematological: Negative.    Psychiatric/Behavioral: Negative.          Physical Exam  Patient is alert, oriented and in no apparent distress.   He moves about comfortably in a wheelchair.   Strength intact in his lower extremities.  Improvement is noted of the small opening within the inferior pole of his incision. I was unable to express any  "drainage today. I do note some straw-colored drainage on the overlying bandage. There is no surrounding erythema or fluctuance.    Vitals:    08/04/21 0900   BP: 138/90   Pulse: 76   Resp: 18   SpO2: 98%   Weight: 120 kg (265 lb)   Height: 182.9 cm (72\")   PainSc:   8   PainLoc: Back  Comment: Lumbar       Patient's Body mass index is 35.94 kg/m². indicating that he is obese (BMI >30).       Assessment:  1.  S/p lumbar discectomy  2.  Wound drainage    Plan:  Mr. Nava is seen today for a wound check.  He is approximately 5 weeks out from a right L3-4 decompression and discectomy.  His wound appears minimally improved today.  He states that he has continued to have some drainage.  We are going to recheck his inflammatory labs on the way out today.  Pending these results we will determine if further imaging is warranted to rule out deep fluid collection. I will call him once labs are resulted and reviewed with Dr. Sorto. We will schedule follow up in 7-10 days.      Aviva Hart PA-C  "

## 2021-08-05 ENCOUNTER — TELEPHONE (OUTPATIENT)
Dept: NEUROSURGERY | Facility: CLINIC | Age: 41
End: 2021-08-05

## 2021-08-05 ENCOUNTER — HOSPITAL ENCOUNTER (OUTPATIENT)
Dept: MRI IMAGING | Facility: HOSPITAL | Age: 41
Discharge: HOME | End: 2021-08-05
Admitting: PHYSICIAN ASSISTANT

## 2021-08-05 DIAGNOSIS — L24.A9 WOUND DRAINAGE: ICD-10-CM

## 2021-08-05 DIAGNOSIS — L24.A9 WOUND DRAINAGE: Primary | ICD-10-CM

## 2021-08-05 PROCEDURE — 72158 MRI LUMBAR SPINE W/O & W/DYE: CPT

## 2021-08-05 PROCEDURE — A9577 INJ MULTIHANCE: HCPCS

## 2021-08-05 PROCEDURE — 0 GADOBENATE DIMEGLUMINE 529 MG/ML SOLUTION

## 2021-08-05 PROCEDURE — 0 GADOBENATE DIMEGLUMINE 529 MG/ML SOLUTION: Performed by: PHYSICIAN ASSISTANT

## 2021-08-05 PROCEDURE — 72158 MRI LUMBAR SPINE W/O & W/DYE: CPT | Performed by: RADIOLOGY

## 2021-08-05 PROCEDURE — A9577 INJ MULTIHANCE: HCPCS | Performed by: PHYSICIAN ASSISTANT

## 2021-08-05 RX ADMIN — GADOBENATE DIMEGLUMINE 20 ML: 529 INJECTION, SOLUTION INTRAVENOUS at 13:20

## 2021-08-05 NOTE — TELEPHONE ENCOUNTER
Patient's lab resulted this morning.  ESR and CRP are trending up (currently 42/2.3 respectively).  Discussed with Dr. Sorto and we are going to arrange for a lumbar MRI with and without tomorrow and have patient follow-up in clinic.  Contacted patient to let him know the plan and that scheduling would be contacting him shortly.

## 2021-08-06 ENCOUNTER — PRE-ADMISSION TESTING (OUTPATIENT)
Dept: PREADMISSION TESTING | Facility: HOSPITAL | Age: 41
End: 2021-08-06

## 2021-08-06 ENCOUNTER — OFFICE VISIT (OUTPATIENT)
Dept: NEUROSURGERY | Facility: CLINIC | Age: 41
End: 2021-08-06

## 2021-08-06 VITALS
TEMPERATURE: 97.6 F | DIASTOLIC BLOOD PRESSURE: 72 MMHG | HEIGHT: 72 IN | BODY MASS INDEX: 35.89 KG/M2 | SYSTOLIC BLOOD PRESSURE: 122 MMHG | WEIGHT: 265 LBS

## 2021-08-06 VITALS — HEIGHT: 73 IN | BODY MASS INDEX: 34.85 KG/M2 | WEIGHT: 263 LBS

## 2021-08-06 DIAGNOSIS — M51.36 DDD (DEGENERATIVE DISC DISEASE), LUMBAR: ICD-10-CM

## 2021-08-06 DIAGNOSIS — T14.8XXA WOUND INFECTION: Primary | ICD-10-CM

## 2021-08-06 DIAGNOSIS — L08.9 WOUND INFECTION: ICD-10-CM

## 2021-08-06 DIAGNOSIS — R53.81 PHYSICAL DECONDITIONING: ICD-10-CM

## 2021-08-06 DIAGNOSIS — L08.9 WOUND INFECTION: Primary | ICD-10-CM

## 2021-08-06 DIAGNOSIS — E66.01 MORBID (SEVERE) OBESITY DUE TO EXCESS CALORIES (HCC): ICD-10-CM

## 2021-08-06 DIAGNOSIS — L24.A9 WOUND DRAINAGE: ICD-10-CM

## 2021-08-06 DIAGNOSIS — M51.26 HNP (HERNIATED NUCLEUS PULPOSUS), LUMBAR: ICD-10-CM

## 2021-08-06 DIAGNOSIS — T14.8XXA WOUND INFECTION: ICD-10-CM

## 2021-08-06 LAB
ALBUMIN SERPL-MCNC: 4.5 G/DL (ref 3.5–5.2)
ALBUMIN/GLOB SERPL: 1.5 G/DL
ALP SERPL-CCNC: 102 U/L (ref 39–117)
ALT SERPL W P-5'-P-CCNC: 22 U/L (ref 1–41)
ANION GAP SERPL CALCULATED.3IONS-SCNC: 13 MMOL/L (ref 5–15)
AST SERPL-CCNC: 17 U/L (ref 1–40)
BILIRUB SERPL-MCNC: 0.3 MG/DL (ref 0–1.2)
BUN SERPL-MCNC: 15 MG/DL (ref 6–20)
BUN/CREAT SERPL: 12.8 (ref 7–25)
CALCIUM SPEC-SCNC: 9.3 MG/DL (ref 8.6–10.5)
CHLORIDE SERPL-SCNC: 99 MMOL/L (ref 98–107)
CO2 SERPL-SCNC: 24 MMOL/L (ref 22–29)
CREAT SERPL-MCNC: 1.17 MG/DL (ref 0.76–1.27)
GFR SERPL CREATININE-BSD FRML MDRD: 69 ML/MIN/1.73
GLOBULIN UR ELPH-MCNC: 3.1 GM/DL
GLUCOSE SERPL-MCNC: 69 MG/DL (ref 65–99)
POTASSIUM SERPL-SCNC: 3.8 MMOL/L (ref 3.5–5.2)
PROT SERPL-MCNC: 7.6 G/DL (ref 6–8.5)
SARS-COV-2 RNA PNL SPEC NAA+PROBE: NOT DETECTED
SODIUM SERPL-SCNC: 136 MMOL/L (ref 136–145)

## 2021-08-06 PROCEDURE — U0004 COV-19 TEST NON-CDC HGH THRU: HCPCS

## 2021-08-06 PROCEDURE — C9803 HOPD COVID-19 SPEC COLLECT: HCPCS

## 2021-08-06 PROCEDURE — 36415 COLL VENOUS BLD VENIPUNCTURE: CPT

## 2021-08-06 PROCEDURE — 80053 COMPREHEN METABOLIC PANEL: CPT

## 2021-08-06 PROCEDURE — 99024 POSTOP FOLLOW-UP VISIT: CPT | Performed by: PHYSICIAN ASSISTANT

## 2021-08-06 RX ORDER — SODIUM CHLORIDE 9 MG/ML
100 INJECTION, SOLUTION INTRAVENOUS CONTINUOUS
Status: CANCELLED | OUTPATIENT
Start: 2021-08-06

## 2021-08-06 RX ORDER — FAMOTIDINE 10 MG
20 TABLET ORAL
Status: CANCELLED | OUTPATIENT
Start: 2021-08-06

## 2021-08-06 RX ORDER — OXYCODONE AND ACETAMINOPHEN 7.5; 325 MG/1; MG/1
1 TABLET ORAL AS NEEDED
Status: ON HOLD | COMMUNITY
End: 2021-08-12 | Stop reason: SDUPTHER

## 2021-08-06 RX ORDER — OXYCODONE HYDROCHLORIDE AND ACETAMINOPHEN 5; 325 MG/1; MG/1
1 TABLET ORAL EVERY 8 HOURS PRN
Qty: 30 TABLET | Refills: 0 | Status: SHIPPED | OUTPATIENT
Start: 2021-08-06 | End: 2021-08-06

## 2021-08-06 NOTE — H&P (VIEW-ONLY)
Patient: Wenceslao Nava  : 1980  Chart #: [id number]    Date of Service: 2021    CHIEF COMPLAINT: Low back and right leg pain; wound drainage.    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  His postoperative course was complicated while wound drainage.  Postoperative cultures demonstrated light growth of staph epidermidis.  He was prescribed Bactrim and doxycycline which he continues.  Recent inflammatory markers have been trending up.  He complains of severe low back pain radiating to the right leg.  He has had some weepy drainage as well.       Past Medical History:   Diagnosis Date   • Arthritis    • Diabetes mellitus (CMS/HCC)    • Diarrhea    • Elevated cholesterol    • Hypertension    • Knee pain, left          Current Outpatient Medications:   •  aspirin (aspirin) 81 MG EC tablet, Take 1 tablet by mouth Daily., Disp: 30 tablet, Rfl: 0  •  atorvastatin (LIPITOR) 20 MG tablet, Take 20 mg by mouth Daily., Disp: , Rfl:   •  cyclobenzaprine (FLEXERIL) 10 MG tablet, Take 1 tablet by mouth 3 (Three) Times a Day As Needed for Muscle Spasms., Disp: 60 tablet, Rfl: 0  •  docusate sodium (Colace) 100 MG capsule, Take 1 capsule by mouth 3 (Three) Times a Day As Needed for Constipation., Disp: 30 capsule, Rfl: 0  •  doxycycline (VIBRAMYCIN) 100 MG capsule, Take 1 capsule by mouth 2 (Two) Times a Day., Disp: 20 capsule, Rfl: 0  •  Dulaglutide (Trulicity) 0.75 MG/0.5ML solution pen-injector, Inject 0.75 mg under the skin into the appropriate area as directed 1 (One) Time Per Week. THURSDAY, Disp: , Rfl:   •  FLUoxetine (PROzac) 40 MG capsule, , Disp: , Rfl:   •  gabapentin (NEURONTIN) 400 MG capsule, Take 400 mg by mouth 3 (Three) Times a Day., Disp: , Rfl:   •  HYDROcodone-acetaminophen (NORCO) 7.5-325 MG per tablet, Take 1 tablet by mouth 3 (Three) Times a Day., Disp: 20 tablet, Rfl: 0  •   lisinopril-hydrochlorothiazide (PRINZIDE,ZESTORETIC) 10-12.5 MG per tablet, Take 1 tablet by mouth Daily., Disp: 15 tablet, Rfl: 0  •  meloxicam (MOBIC) 15 MG tablet, Take 15 mg by mouth Daily., Disp: , Rfl:   •  metFORMIN ER (GLUCOPHAGE-XR) 750 MG 24 hr tablet, , Disp: , Rfl:   •  nabumetone (RELAFEN) 500 MG tablet, Take 500 mg by mouth 2 (Two) Times a Day As Needed for Mild Pain ., Disp: , Rfl:   •  oxyCODONE-acetaminophen (PERCOCET) 5-325 MG per tablet, Take 1 tablet by mouth Every 8 (Eight) Hours As Needed for Severe Pain ., Disp: 30 tablet, Rfl: 0  •  sulfamethoxazole-trimethoprim (Bactrim DS) 800-160 MG per tablet, Take 1 tablet by mouth 2 (Two) Times a Day., Disp: 20 tablet, Rfl: 0  No current facility-administered medications for this visit.    Past Surgical History:   Procedure Laterality Date   • CHOLECYSTECTOMY     • LUMBAR DISCECTOMY Right 6/24/2021    Procedure: LAMINOTOMY, FORAMINOTOMY, DISCECTOMY, L3-4 RIGHT;  Surgeon: Sreekanth Sorto MD;  Location: [facility];  Service: Neurosurgery;  Laterality: Right;   • NECK SURGERY      May 2019: Believes it to be an ACDF, RAVINDRA Jackman       Social History     Socioeconomic History   • Marital status: Single     Spouse name: Not on file   • Number of children: Not on file   • Years of education: Not on file   • Highest education level: Not on file   Tobacco Use   • Smoking status: Never Smoker   • Smokeless tobacco: Current User     Types: Snuff   Vaping Use   • Vaping Use: Never used   Substance and Sexual Activity   • Alcohol use: Not Currently     Comment: RARELY   • Drug use: No   • Sexual activity: Defer         Review of Systems   Constitutional: Positive for fatigue.   Gastrointestinal: Positive for constipation.   Musculoskeletal: Positive for back pain.   Neurological: Positive for weakness and numbness.   Psychiatric/Behavioral: Positive for sleep disturbance.   All other systems reviewed and are negative.      Objective   Vital Signs:  "Blood pressure 122/72, temperature 97.6 °F (36.4 °C), height 182.9 cm (72\"), weight 120 kg (265 lb).  Physical Exam  Vitals and nursing note reviewed.   Constitutional:       General: He is not in acute distress.     Appearance: He is well-developed.   HENT:      Head: Normocephalic and atraumatic.   Eyes:      Pupils: Pupils are equal, round, and reactive to light.   Cardiovascular:      Heart sounds: Normal heart sounds.   Pulmonary:      Breath sounds: Normal breath sounds.   Psychiatric:         Behavior: Behavior normal.         Thought Content: Thought content normal.     Musculoskeletal:     Strength is intact in upper and lower extremities to direct testing.     Station and gait are normal.  Neurologic:     Muscle tone is normal throughout.     Coordination is intact.     Sensation is intact to light touch throughout.     Patient is oriented to person, place, and time.         Independent review of radiographic imaging: MRI of the lumbar spine demonstrates subcutaneous fluid collection concerning for abscess at the operative level.    Assessment/Plan   Diagnosis: Postoperative subcutaneous wound infection    Medical Decision Making: Dr. Sorto has recommended wound I&D and we will plan to follow with a course of IV antibiotics.  We will try to add on for Monday.  Dr. Sorto has discussed the general nature of the procedure as well as risks, complications, and limitations of the procedure.        Diagnoses and all orders for this visit:    1. Wound infection (Primary)    2. Wound drainage    3. DDD (degenerative disc disease), lumbar    4. HNP (herniated nucleus pulposus), lumbar    5. Morbid (severe) obesity due to excess calories (CMS/[facility])    6. Physical deconditioning                      Isela Dunn PA-C  Patient Care Team:  Flores Chua PA as PCP - General (Physician Assistant)  Flores Chua PA as Referring Physician (Physician Assistant)              "

## 2021-08-06 NOTE — PAT
ekg 21  CBC 21    COVID in PAT    Patient to apply Chlorhexadine wipes  to surgical area (as instructed) the night before procedure and the AM of procedure. Wipes provided.    Patient instructed to drink 20 ounces (or until full) of Gatorade and it needs to be completed 1 hour (for Main OR patients) or 2 hours (scheduled  section patients) before given arrival time for procedure (NO RED Gatorade)    Patient verbalized understanding.    An arrival time for procedure was not given during PAT visit. If patient had any questions or concerns about their arrival time, they were instructed to contact their surgeon/physician.  Additionally, if the patient referred to an arrival time that was acquired from their my chart account, patient was encouraged to verify that time with their surgeon/physician.  NO arrival times given in Pre Admission Testing Department.

## 2021-08-06 NOTE — PROGRESS NOTES
Patient: Wenceslao Nava  : 1980  Chart #: [id number]    Date of Service: 2021    CHIEF COMPLAINT: Low back and right leg pain; wound drainage.    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  His postoperative course was complicated while wound drainage.  Postoperative cultures demonstrated light growth of staph epidermidis.  He was prescribed Bactrim and doxycycline which he continues.  Recent inflammatory markers have been trending up.  He complains of severe low back pain radiating to the right leg.  He has had some weepy drainage as well.       Past Medical History:   Diagnosis Date   • Arthritis    • Diabetes mellitus (CMS/HCC)    • Diarrhea    • Elevated cholesterol    • Hypertension    • Knee pain, left          Current Outpatient Medications:   •  aspirin (aspirin) 81 MG EC tablet, Take 1 tablet by mouth Daily., Disp: 30 tablet, Rfl: 0  •  atorvastatin (LIPITOR) 20 MG tablet, Take 20 mg by mouth Daily., Disp: , Rfl:   •  cyclobenzaprine (FLEXERIL) 10 MG tablet, Take 1 tablet by mouth 3 (Three) Times a Day As Needed for Muscle Spasms., Disp: 60 tablet, Rfl: 0  •  docusate sodium (Colace) 100 MG capsule, Take 1 capsule by mouth 3 (Three) Times a Day As Needed for Constipation., Disp: 30 capsule, Rfl: 0  •  doxycycline (VIBRAMYCIN) 100 MG capsule, Take 1 capsule by mouth 2 (Two) Times a Day., Disp: 20 capsule, Rfl: 0  •  Dulaglutide (Trulicity) 0.75 MG/0.5ML solution pen-injector, Inject 0.75 mg under the skin into the appropriate area as directed 1 (One) Time Per Week. THURSDAY, Disp: , Rfl:   •  FLUoxetine (PROzac) 40 MG capsule, , Disp: , Rfl:   •  gabapentin (NEURONTIN) 400 MG capsule, Take 400 mg by mouth 3 (Three) Times a Day., Disp: , Rfl:   •  HYDROcodone-acetaminophen (NORCO) 7.5-325 MG per tablet, Take 1 tablet by mouth 3 (Three) Times a Day., Disp: 20 tablet, Rfl: 0  •   lisinopril-hydrochlorothiazide (PRINZIDE,ZESTORETIC) 10-12.5 MG per tablet, Take 1 tablet by mouth Daily., Disp: 15 tablet, Rfl: 0  •  meloxicam (MOBIC) 15 MG tablet, Take 15 mg by mouth Daily., Disp: , Rfl:   •  metFORMIN ER (GLUCOPHAGE-XR) 750 MG 24 hr tablet, , Disp: , Rfl:   •  nabumetone (RELAFEN) 500 MG tablet, Take 500 mg by mouth 2 (Two) Times a Day As Needed for Mild Pain ., Disp: , Rfl:   •  oxyCODONE-acetaminophen (PERCOCET) 5-325 MG per tablet, Take 1 tablet by mouth Every 8 (Eight) Hours As Needed for Severe Pain ., Disp: 30 tablet, Rfl: 0  •  sulfamethoxazole-trimethoprim (Bactrim DS) 800-160 MG per tablet, Take 1 tablet by mouth 2 (Two) Times a Day., Disp: 20 tablet, Rfl: 0  No current facility-administered medications for this visit.    Past Surgical History:   Procedure Laterality Date   • CHOLECYSTECTOMY     • LUMBAR DISCECTOMY Right 6/24/2021    Procedure: LAMINOTOMY, FORAMINOTOMY, DISCECTOMY, L3-4 RIGHT;  Surgeon: Sreekanth Sorto MD;  Location: [facility];  Service: Neurosurgery;  Laterality: Right;   • NECK SURGERY      May 2019: Believes it to be an ACDF, RAVINDRA Jackman       Social History     Socioeconomic History   • Marital status: Single     Spouse name: Not on file   • Number of children: Not on file   • Years of education: Not on file   • Highest education level: Not on file   Tobacco Use   • Smoking status: Never Smoker   • Smokeless tobacco: Current User     Types: Snuff   Vaping Use   • Vaping Use: Never used   Substance and Sexual Activity   • Alcohol use: Not Currently     Comment: RARELY   • Drug use: No   • Sexual activity: Defer         Review of Systems   Constitutional: Positive for fatigue.   Gastrointestinal: Positive for constipation.   Musculoskeletal: Positive for back pain.   Neurological: Positive for weakness and numbness.   Psychiatric/Behavioral: Positive for sleep disturbance.   All other systems reviewed and are negative.      Objective   Vital Signs:  "Blood pressure 122/72, temperature 97.6 °F (36.4 °C), height 182.9 cm (72\"), weight 120 kg (265 lb).  Physical Exam  Vitals and nursing note reviewed.   Constitutional:       General: He is not in acute distress.     Appearance: He is well-developed.   HENT:      Head: Normocephalic and atraumatic.   Eyes:      Pupils: Pupils are equal, round, and reactive to light.   Cardiovascular:      Heart sounds: Normal heart sounds.   Pulmonary:      Breath sounds: Normal breath sounds.   Psychiatric:         Behavior: Behavior normal.         Thought Content: Thought content normal.     Musculoskeletal:     Strength is intact in upper and lower extremities to direct testing.     Station and gait are normal.  Neurologic:     Muscle tone is normal throughout.     Coordination is intact.     Sensation is intact to light touch throughout.     Patient is oriented to person, place, and time.         Independent review of radiographic imaging: MRI of the lumbar spine demonstrates subcutaneous fluid collection concerning for abscess at the operative level.    Assessment/Plan   Diagnosis: Postoperative subcutaneous wound infection    Medical Decision Making: Dr. Sorto has recommended wound I&D and we will plan to follow with a course of IV antibiotics.  We will try to add on for Monday.  Dr. Sorto has discussed the general nature of the procedure as well as risks, complications, and limitations of the procedure.        Diagnoses and all orders for this visit:    1. Wound infection (Primary)    2. Wound drainage    3. DDD (degenerative disc disease), lumbar    4. HNP (herniated nucleus pulposus), lumbar    5. Morbid (severe) obesity due to excess calories (CMS/[facility])    6. Physical deconditioning                      Isela Dunn PA-C  Patient Care Team:  Flores Chua PA as PCP - General (Physician Assistant)  Flores Chua PA as Referring Physician (Physician Assistant)              "

## 2021-08-06 NOTE — H&P
Patient: Wenceslao Nava  : 1980  Chart #: [id number]    Date of Service: 2021    CHIEF COMPLAINT: Low back and right leg pain; wound drainage.    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  His postoperative course was complicated while wound drainage.  Postoperative cultures demonstrated light growth of staph epidermidis.  He was prescribed Bactrim and doxycycline which he continues.  Recent inflammatory markers have been trending up.  He complains of severe low back pain radiating to the right leg.  He has had some weepy drainage as well.       Past Medical History:   Diagnosis Date   • Arthritis    • Diabetes mellitus (CMS/HCC)    • Diarrhea    • Elevated cholesterol    • Hypertension    • Knee pain, left          Current Outpatient Medications:   •  aspirin (aspirin) 81 MG EC tablet, Take 1 tablet by mouth Daily., Disp: 30 tablet, Rfl: 0  •  atorvastatin (LIPITOR) 20 MG tablet, Take 20 mg by mouth Daily., Disp: , Rfl:   •  cyclobenzaprine (FLEXERIL) 10 MG tablet, Take 1 tablet by mouth 3 (Three) Times a Day As Needed for Muscle Spasms., Disp: 60 tablet, Rfl: 0  •  docusate sodium (Colace) 100 MG capsule, Take 1 capsule by mouth 3 (Three) Times a Day As Needed for Constipation., Disp: 30 capsule, Rfl: 0  •  doxycycline (VIBRAMYCIN) 100 MG capsule, Take 1 capsule by mouth 2 (Two) Times a Day., Disp: 20 capsule, Rfl: 0  •  Dulaglutide (Trulicity) 0.75 MG/0.5ML solution pen-injector, Inject 0.75 mg under the skin into the appropriate area as directed 1 (One) Time Per Week. THURSDAY, Disp: , Rfl:   •  FLUoxetine (PROzac) 40 MG capsule, , Disp: , Rfl:   •  gabapentin (NEURONTIN) 400 MG capsule, Take 400 mg by mouth 3 (Three) Times a Day., Disp: , Rfl:   •  HYDROcodone-acetaminophen (NORCO) 7.5-325 MG per tablet, Take 1 tablet by mouth 3 (Three) Times a Day., Disp: 20 tablet, Rfl: 0  •   lisinopril-hydrochlorothiazide (PRINZIDE,ZESTORETIC) 10-12.5 MG per tablet, Take 1 tablet by mouth Daily., Disp: 15 tablet, Rfl: 0  •  meloxicam (MOBIC) 15 MG tablet, Take 15 mg by mouth Daily., Disp: , Rfl:   •  metFORMIN ER (GLUCOPHAGE-XR) 750 MG 24 hr tablet, , Disp: , Rfl:   •  nabumetone (RELAFEN) 500 MG tablet, Take 500 mg by mouth 2 (Two) Times a Day As Needed for Mild Pain ., Disp: , Rfl:   •  oxyCODONE-acetaminophen (PERCOCET) 5-325 MG per tablet, Take 1 tablet by mouth Every 8 (Eight) Hours As Needed for Severe Pain ., Disp: 30 tablet, Rfl: 0  •  sulfamethoxazole-trimethoprim (Bactrim DS) 800-160 MG per tablet, Take 1 tablet by mouth 2 (Two) Times a Day., Disp: 20 tablet, Rfl: 0  No current facility-administered medications for this visit.    Past Surgical History:   Procedure Laterality Date   • CHOLECYSTECTOMY     • LUMBAR DISCECTOMY Right 6/24/2021    Procedure: LAMINOTOMY, FORAMINOTOMY, DISCECTOMY, L3-4 RIGHT;  Surgeon: Sreekanth Sorto MD;  Location: [facility];  Service: Neurosurgery;  Laterality: Right;   • NECK SURGERY      May 2019: Believes it to be an ACDF, RAVINDRA Jackman       Social History     Socioeconomic History   • Marital status: Single     Spouse name: Not on file   • Number of children: Not on file   • Years of education: Not on file   • Highest education level: Not on file   Tobacco Use   • Smoking status: Never Smoker   • Smokeless tobacco: Current User     Types: Snuff   Vaping Use   • Vaping Use: Never used   Substance and Sexual Activity   • Alcohol use: Not Currently     Comment: RARELY   • Drug use: No   • Sexual activity: Defer         Review of Systems   Constitutional: Positive for fatigue.   Gastrointestinal: Positive for constipation.   Musculoskeletal: Positive for back pain.   Neurological: Positive for weakness and numbness.   Psychiatric/Behavioral: Positive for sleep disturbance.   All other systems reviewed and are negative.      Objective   Vital Signs:  "Blood pressure 122/72, temperature 97.6 °F (36.4 °C), height 182.9 cm (72\"), weight 120 kg (265 lb).  Physical Exam  Vitals and nursing note reviewed.   Constitutional:       General: He is not in acute distress.     Appearance: He is well-developed.   HENT:      Head: Normocephalic and atraumatic.   Eyes:      Pupils: Pupils are equal, round, and reactive to light.   Cardiovascular:      Heart sounds: Normal heart sounds.   Pulmonary:      Breath sounds: Normal breath sounds.   Psychiatric:         Behavior: Behavior normal.         Thought Content: Thought content normal.   Musculoskeletal:     Strength is intact in upper and lower extremities to direct testing.     Station and gait are normal.  Neurologic:     Muscle tone is normal throughout.     Coordination is intact.     Sensation is intact to light touch throughout.     Patient is oriented to person, place, and time.         Independent review of radiographic imaging: MRI of the lumbar spine demonstrates subcutaneous fluid collection concerning for abscess at the operative level.    Assessment/Plan   Diagnosis: Postoperative subcutaneous wound infection    Medical Decision Making: Dr. Sorto has recommended wound I&D and we will plan to follow with a course of IV antibiotics.  We will try to add on for Monday.  Dr. Sorto has discussed the general nature of the procedure as well as risks, complications, and limitations of the procedure.        Diagnoses and all orders for this visit:    1. Wound infection (Primary)    2. Wound drainage    3. DDD (degenerative disc disease), lumbar    4. HNP (herniated nucleus pulposus), lumbar    5. Morbid (severe) obesity due to excess calories (CMS/[facility])    6. Physical deconditioning                      Isela Dunn PA-C  Patient Care Team:  Flores Chua PA as PCP - General (Physician Assistant)  Flores Chua PA as Referring Physician (Physician Assistant)      "

## 2021-08-08 ENCOUNTER — ANESTHESIA EVENT (OUTPATIENT)
Dept: PERIOP | Facility: HOSPITAL | Age: 41
End: 2021-08-08

## 2021-08-08 RX ORDER — FAMOTIDINE 10 MG/ML
20 INJECTION, SOLUTION INTRAVENOUS ONCE
Status: CANCELLED | OUTPATIENT
Start: 2021-08-08 | End: 2021-08-08

## 2021-08-09 ENCOUNTER — HOSPITAL ENCOUNTER (OUTPATIENT)
Facility: HOSPITAL | Age: 41
Discharge: HOME | End: 2021-08-12
Attending: NEUROLOGICAL SURGERY | Admitting: NEUROLOGICAL SURGERY

## 2021-08-09 ENCOUNTER — ANESTHESIA (OUTPATIENT)
Dept: PERIOP | Facility: HOSPITAL | Age: 41
End: 2021-08-09

## 2021-08-09 DIAGNOSIS — T14.8XXA WOUND INFECTION: ICD-10-CM

## 2021-08-09 DIAGNOSIS — L08.9 WOUND INFECTION: ICD-10-CM

## 2021-08-09 LAB
GLUCOSE BLDC GLUCOMTR-MCNC: 104 MG/DL (ref 70–130)
GLUCOSE BLDC GLUCOMTR-MCNC: 111 MG/DL (ref 70–130)
GLUCOSE BLDC GLUCOMTR-MCNC: 128 MG/DL (ref 70–130)
GLUCOSE BLDC GLUCOMTR-MCNC: 80 MG/DL (ref 70–130)

## 2021-08-09 PROCEDURE — 25010000002 DEXAMETHASONE PER 1 MG: Performed by: NURSE ANESTHETIST, CERTIFIED REGISTERED

## 2021-08-09 PROCEDURE — 87205 SMEAR GRAM STAIN: CPT | Performed by: NEUROLOGICAL SURGERY

## 2021-08-09 PROCEDURE — C1889 IMPLANT/INSERT DEVICE, NOC: HCPCS | Performed by: NEUROLOGICAL SURGERY

## 2021-08-09 PROCEDURE — 25010000002 ONDANSETRON PER 1 MG: Performed by: NURSE ANESTHETIST, CERTIFIED REGISTERED

## 2021-08-09 PROCEDURE — 25010000002 FENTANYL CITRATE (PF) 50 MCG/ML SOLUTION: Performed by: NURSE ANESTHETIST, CERTIFIED REGISTERED

## 2021-08-09 PROCEDURE — 82962 GLUCOSE BLOOD TEST: CPT

## 2021-08-09 PROCEDURE — 87070 CULTURE OTHR SPECIMN AEROBIC: CPT | Performed by: NEUROLOGICAL SURGERY

## 2021-08-09 PROCEDURE — 87075 CULTR BACTERIA EXCEPT BLOOD: CPT | Performed by: NEUROLOGICAL SURGERY

## 2021-08-09 PROCEDURE — 25010000002 MIDAZOLAM PER 1 MG: Performed by: NURSE ANESTHETIST, CERTIFIED REGISTERED

## 2021-08-09 PROCEDURE — 25010000002 HYDROMORPHONE PER 4 MG: Performed by: NURSE ANESTHETIST, CERTIFIED REGISTERED

## 2021-08-09 PROCEDURE — G0378 HOSPITAL OBSERVATION PER HR: HCPCS

## 2021-08-09 PROCEDURE — 25010000002 VANCOMYCIN 10 G RECONSTITUTED SOLUTION

## 2021-08-09 PROCEDURE — 25010000003 LIDOCAINE 1 % SOLUTION: Performed by: NURSE ANESTHETIST, CERTIFIED REGISTERED

## 2021-08-09 PROCEDURE — 0 CEFTRIAXONE PER 250 MG: Performed by: NEUROLOGICAL SURGERY

## 2021-08-09 PROCEDURE — 25010000002 VANCOMYCIN: Performed by: NEUROLOGICAL SURGERY

## 2021-08-09 PROCEDURE — 25010000002 VANCOMYCIN 1 G RECONSTITUTED SOLUTION: Performed by: NEUROLOGICAL SURGERY

## 2021-08-09 PROCEDURE — 25010000003 CEFTRIAXONE PER 250 MG: Performed by: NEUROLOGICAL SURGERY

## 2021-08-09 PROCEDURE — 0 LIDOCAINE 1 % SOLUTION: Performed by: NURSE ANESTHETIST, CERTIFIED REGISTERED

## 2021-08-09 PROCEDURE — 11042 DBRDMT SUBQ TIS 1ST 20SQCM/<: CPT | Performed by: NEUROLOGICAL SURGERY

## 2021-08-09 PROCEDURE — 25010000002 PROPOFOL 10 MG/ML EMULSION: Performed by: NURSE ANESTHETIST, CERTIFIED REGISTERED

## 2021-08-09 DEVICE — HEMOST ABS SURGIFOAM SZ100 8X12 10MM: Type: IMPLANTABLE DEVICE | Site: SPINE LUMBAR | Status: FUNCTIONAL

## 2021-08-09 RX ORDER — ASPIRIN 81 MG/1
81 TABLET ORAL DAILY
Status: DISCONTINUED | OUTPATIENT
Start: 2021-08-10 | End: 2021-08-12 | Stop reason: HOSPADM

## 2021-08-09 RX ORDER — LIDOCAINE HYDROCHLORIDE 10 MG/ML
INJECTION, SOLUTION INFILTRATION; PERINEURAL AS NEEDED
Status: DISCONTINUED | OUTPATIENT
Start: 2021-08-09 | End: 2021-08-09 | Stop reason: SURG

## 2021-08-09 RX ORDER — GABAPENTIN 400 MG/1
400 CAPSULE ORAL 3 TIMES DAILY
Status: DISCONTINUED | OUTPATIENT
Start: 2021-08-09 | End: 2021-08-12 | Stop reason: HOSPADM

## 2021-08-09 RX ORDER — ROCURONIUM BROMIDE 10 MG/ML
INJECTION, SOLUTION INTRAVENOUS AS NEEDED
Status: DISCONTINUED | OUTPATIENT
Start: 2021-08-09 | End: 2021-08-09 | Stop reason: SURG

## 2021-08-09 RX ORDER — SODIUM CHLORIDE 0.9 % (FLUSH) 0.9 %
10 SYRINGE (ML) INJECTION EVERY 12 HOURS SCHEDULED
Status: DISCONTINUED | OUTPATIENT
Start: 2021-08-09 | End: 2021-08-09 | Stop reason: HOSPADM

## 2021-08-09 RX ORDER — PROPOFOL 10 MG/ML
VIAL (ML) INTRAVENOUS AS NEEDED
Status: DISCONTINUED | OUTPATIENT
Start: 2021-08-09 | End: 2021-08-09 | Stop reason: SURG

## 2021-08-09 RX ORDER — BUPIVACAINE HCL/0.9 % NACL/PF 0.125 %
PLASTIC BAG, INJECTION (ML) EPIDURAL AS NEEDED
Status: DISCONTINUED | OUTPATIENT
Start: 2021-08-09 | End: 2021-08-09 | Stop reason: SURG

## 2021-08-09 RX ORDER — MIDAZOLAM HYDROCHLORIDE 1 MG/ML
1 INJECTION INTRAMUSCULAR; INTRAVENOUS
Status: DISCONTINUED | OUTPATIENT
Start: 2021-08-09 | End: 2021-08-09 | Stop reason: HOSPADM

## 2021-08-09 RX ORDER — FENTANYL CITRATE 50 UG/ML
INJECTION, SOLUTION INTRAMUSCULAR; INTRAVENOUS AS NEEDED
Status: DISCONTINUED | OUTPATIENT
Start: 2021-08-09 | End: 2021-08-09 | Stop reason: SURG

## 2021-08-09 RX ORDER — VANCOMYCIN HYDROCHLORIDE 1 G/20ML
INJECTION, POWDER, LYOPHILIZED, FOR SOLUTION INTRAVENOUS AS NEEDED
Status: DISCONTINUED | OUTPATIENT
Start: 2021-08-09 | End: 2021-08-09 | Stop reason: HOSPADM

## 2021-08-09 RX ORDER — ONDANSETRON 4 MG/1
4 TABLET, FILM COATED ORAL EVERY 6 HOURS PRN
Status: DISCONTINUED | OUTPATIENT
Start: 2021-08-09 | End: 2021-08-12 | Stop reason: HOSPADM

## 2021-08-09 RX ORDER — SODIUM CHLORIDE 0.9 % (FLUSH) 0.9 %
10 SYRINGE (ML) INJECTION AS NEEDED
Status: DISCONTINUED | OUTPATIENT
Start: 2021-08-09 | End: 2021-08-09 | Stop reason: HOSPADM

## 2021-08-09 RX ORDER — MAGNESIUM HYDROXIDE 1200 MG/15ML
LIQUID ORAL AS NEEDED
Status: DISCONTINUED | OUTPATIENT
Start: 2021-08-09 | End: 2021-08-09 | Stop reason: HOSPADM

## 2021-08-09 RX ORDER — ONDANSETRON 2 MG/ML
4 INJECTION INTRAMUSCULAR; INTRAVENOUS EVERY 6 HOURS PRN
Status: DISCONTINUED | OUTPATIENT
Start: 2021-08-09 | End: 2021-08-12 | Stop reason: HOSPADM

## 2021-08-09 RX ORDER — DIPHENHYDRAMINE HCL 25 MG
25 CAPSULE ORAL NIGHTLY PRN
Status: DISCONTINUED | OUTPATIENT
Start: 2021-08-09 | End: 2021-08-12 | Stop reason: HOSPADM

## 2021-08-09 RX ORDER — NAPROXEN 500 MG/1
500 TABLET ORAL 2 TIMES DAILY PRN
Status: DISCONTINUED | OUTPATIENT
Start: 2021-08-09 | End: 2021-08-12 | Stop reason: HOSPADM

## 2021-08-09 RX ORDER — EPHEDRINE SULFATE 50 MG/ML
INJECTION, SOLUTION INTRAVENOUS AS NEEDED
Status: DISCONTINUED | OUTPATIENT
Start: 2021-08-09 | End: 2021-08-09 | Stop reason: SURG

## 2021-08-09 RX ORDER — MIDAZOLAM HYDROCHLORIDE 1 MG/ML
INJECTION INTRAMUSCULAR; INTRAVENOUS AS NEEDED
Status: DISCONTINUED | OUTPATIENT
Start: 2021-08-09 | End: 2021-08-09 | Stop reason: SURG

## 2021-08-09 RX ORDER — OXYCODONE AND ACETAMINOPHEN 7.5; 325 MG/1; MG/1
1 TABLET ORAL EVERY 4 HOURS PRN
Status: DISCONTINUED | OUTPATIENT
Start: 2021-08-09 | End: 2021-08-12 | Stop reason: HOSPADM

## 2021-08-09 RX ORDER — SODIUM CHLORIDE, SODIUM LACTATE, POTASSIUM CHLORIDE, CALCIUM CHLORIDE 600; 310; 30; 20 MG/100ML; MG/100ML; MG/100ML; MG/100ML
75 INJECTION, SOLUTION INTRAVENOUS CONTINUOUS
Status: DISCONTINUED | OUTPATIENT
Start: 2021-08-09 | End: 2021-08-10

## 2021-08-09 RX ORDER — ATORVASTATIN CALCIUM 20 MG/1
20 TABLET, FILM COATED ORAL DAILY
Status: DISCONTINUED | OUTPATIENT
Start: 2021-08-10 | End: 2021-08-12 | Stop reason: HOSPADM

## 2021-08-09 RX ORDER — MORPHINE SULFATE 4 MG/ML
4 INJECTION, SOLUTION INTRAMUSCULAR; INTRAVENOUS EVERY 4 HOURS PRN
Status: DISCONTINUED | OUTPATIENT
Start: 2021-08-09 | End: 2021-08-12 | Stop reason: HOSPADM

## 2021-08-09 RX ORDER — FLUOXETINE HYDROCHLORIDE 20 MG/1
40 CAPSULE ORAL DAILY
Status: DISCONTINUED | OUTPATIENT
Start: 2021-08-10 | End: 2021-08-12 | Stop reason: HOSPADM

## 2021-08-09 RX ORDER — LIDOCAINE 50 MG/G
OINTMENT TOPICAL AS NEEDED
Status: DISCONTINUED | OUTPATIENT
Start: 2021-08-09 | End: 2021-08-09 | Stop reason: SURG

## 2021-08-09 RX ORDER — CYCLOBENZAPRINE HCL 10 MG
10 TABLET ORAL 3 TIMES DAILY PRN
Status: DISCONTINUED | OUTPATIENT
Start: 2021-08-09 | End: 2021-08-12 | Stop reason: HOSPADM

## 2021-08-09 RX ORDER — ONDANSETRON 2 MG/ML
4 INJECTION INTRAMUSCULAR; INTRAVENOUS ONCE AS NEEDED
Status: DISCONTINUED | OUTPATIENT
Start: 2021-08-09 | End: 2021-08-09 | Stop reason: HOSPADM

## 2021-08-09 RX ORDER — DEXAMETHASONE SODIUM PHOSPHATE 4 MG/ML
INJECTION, SOLUTION INTRA-ARTICULAR; INTRALESIONAL; INTRAMUSCULAR; INTRAVENOUS; SOFT TISSUE AS NEEDED
Status: DISCONTINUED | OUTPATIENT
Start: 2021-08-09 | End: 2021-08-09 | Stop reason: SURG

## 2021-08-09 RX ORDER — FENTANYL CITRATE 50 UG/ML
50 INJECTION, SOLUTION INTRAMUSCULAR; INTRAVENOUS
Status: DISCONTINUED | OUTPATIENT
Start: 2021-08-09 | End: 2021-08-09 | Stop reason: HOSPADM

## 2021-08-09 RX ORDER — SODIUM CHLORIDE 0.9 % (FLUSH) 0.9 %
3 SYRINGE (ML) INJECTION EVERY 12 HOURS SCHEDULED
Status: DISCONTINUED | OUTPATIENT
Start: 2021-08-09 | End: 2021-08-12 | Stop reason: HOSPADM

## 2021-08-09 RX ORDER — SODIUM CHLORIDE 0.9 % (FLUSH) 0.9 %
10 SYRINGE (ML) INJECTION AS NEEDED
Status: DISCONTINUED | OUTPATIENT
Start: 2021-08-09 | End: 2021-08-12 | Stop reason: HOSPADM

## 2021-08-09 RX ORDER — LIDOCAINE HYDROCHLORIDE 10 MG/ML
0.5 INJECTION, SOLUTION EPIDURAL; INFILTRATION; INTRACAUDAL; PERINEURAL ONCE AS NEEDED
Status: COMPLETED | OUTPATIENT
Start: 2021-08-09 | End: 2021-08-09

## 2021-08-09 RX ORDER — GLYCOPYRROLATE 0.2 MG/ML
INJECTION INTRAMUSCULAR; INTRAVENOUS AS NEEDED
Status: DISCONTINUED | OUTPATIENT
Start: 2021-08-09 | End: 2021-08-09 | Stop reason: SURG

## 2021-08-09 RX ORDER — ACETAMINOPHEN 325 MG/1
650 TABLET ORAL EVERY 4 HOURS PRN
Status: DISCONTINUED | OUTPATIENT
Start: 2021-08-09 | End: 2021-08-12 | Stop reason: HOSPADM

## 2021-08-09 RX ORDER — CEFTRIAXONE SODIUM 2 G/50ML
2 INJECTION, SOLUTION INTRAVENOUS EVERY 24 HOURS
Status: DISCONTINUED | OUTPATIENT
Start: 2021-08-09 | End: 2021-08-12 | Stop reason: HOSPADM

## 2021-08-09 RX ORDER — NALOXONE HCL 0.4 MG/ML
0.4 VIAL (ML) INJECTION
Status: DISCONTINUED | OUTPATIENT
Start: 2021-08-09 | End: 2021-08-12 | Stop reason: HOSPADM

## 2021-08-09 RX ORDER — SODIUM CHLORIDE, SODIUM LACTATE, POTASSIUM CHLORIDE, CALCIUM CHLORIDE 600; 310; 30; 20 MG/100ML; MG/100ML; MG/100ML; MG/100ML
9 INJECTION, SOLUTION INTRAVENOUS CONTINUOUS
Status: DISCONTINUED | OUTPATIENT
Start: 2021-08-09 | End: 2021-08-12 | Stop reason: HOSPADM

## 2021-08-09 RX ORDER — ONDANSETRON 2 MG/ML
INJECTION INTRAMUSCULAR; INTRAVENOUS AS NEEDED
Status: DISCONTINUED | OUTPATIENT
Start: 2021-08-09 | End: 2021-08-09 | Stop reason: SURG

## 2021-08-09 RX ORDER — DOCUSATE SODIUM 100 MG/1
100 CAPSULE, LIQUID FILLED ORAL 3 TIMES DAILY PRN
Status: DISCONTINUED | OUTPATIENT
Start: 2021-08-09 | End: 2021-08-12 | Stop reason: HOSPADM

## 2021-08-09 RX ORDER — HYDROCODONE BITARTRATE AND ACETAMINOPHEN 5; 325 MG/1; MG/1
1 TABLET ORAL ONCE AS NEEDED
Status: COMPLETED | OUTPATIENT
Start: 2021-08-09 | End: 2021-08-09

## 2021-08-09 RX ORDER — FAMOTIDINE 20 MG/1
20 TABLET, FILM COATED ORAL ONCE
Status: COMPLETED | OUTPATIENT
Start: 2021-08-09 | End: 2021-08-09

## 2021-08-09 RX ORDER — HYDROMORPHONE HYDROCHLORIDE 1 MG/ML
0.5 INJECTION, SOLUTION INTRAMUSCULAR; INTRAVENOUS; SUBCUTANEOUS
Status: DISCONTINUED | OUTPATIENT
Start: 2021-08-09 | End: 2021-08-09 | Stop reason: HOSPADM

## 2021-08-09 RX ORDER — BISACODYL 10 MG
10 SUPPOSITORY, RECTAL RECTAL DAILY PRN
Status: DISCONTINUED | OUTPATIENT
Start: 2021-08-09 | End: 2021-08-12 | Stop reason: HOSPADM

## 2021-08-09 RX ORDER — CEFAZOLIN SODIUM 2 G/100ML
2 INJECTION, SOLUTION INTRAVENOUS ONCE
Status: DISCONTINUED | OUTPATIENT
Start: 2021-08-09 | End: 2021-08-09 | Stop reason: HOSPADM

## 2021-08-09 RX ORDER — FAMOTIDINE 20 MG/1
20 TABLET, FILM COATED ORAL EVERY 12 HOURS SCHEDULED
Status: DISCONTINUED | OUTPATIENT
Start: 2021-08-09 | End: 2021-08-12 | Stop reason: HOSPADM

## 2021-08-09 RX ORDER — SODIUM CHLORIDE 9 MG/ML
INJECTION, SOLUTION INTRAVENOUS CONTINUOUS PRN
Status: DISCONTINUED | OUTPATIENT
Start: 2021-08-09 | End: 2021-08-09 | Stop reason: SURG

## 2021-08-09 RX ADMIN — SODIUM CHLORIDE, POTASSIUM CHLORIDE, SODIUM LACTATE AND CALCIUM CHLORIDE 9 ML/HR: 600; 310; 30; 20 INJECTION, SOLUTION INTRAVENOUS at 06:31

## 2021-08-09 RX ADMIN — LIDOCAINE 1 APPLICATION: 50 OINTMENT TOPICAL at 08:35

## 2021-08-09 RX ADMIN — ACETAMINOPHEN 650 MG: 325 TABLET, FILM COATED ORAL at 09:50

## 2021-08-09 RX ADMIN — HYDROMORPHONE HYDROCHLORIDE 0.5 MG: 1 INJECTION, SOLUTION INTRAMUSCULAR; INTRAVENOUS; SUBCUTANEOUS at 09:41

## 2021-08-09 RX ADMIN — EPHEDRINE SULFATE 5 MG: 50 INJECTION INTRAVENOUS at 08:52

## 2021-08-09 RX ADMIN — ROCURONIUM BROMIDE 50 MG: 10 INJECTION, SOLUTION INTRAVENOUS at 08:34

## 2021-08-09 RX ADMIN — HYDROMORPHONE HYDROCHLORIDE 0.5 MG: 1 INJECTION, SOLUTION INTRAMUSCULAR; INTRAVENOUS; SUBCUTANEOUS at 10:03

## 2021-08-09 RX ADMIN — METFORMIN HYDROCHLORIDE 750 MG: 500 TABLET ORAL at 17:06

## 2021-08-09 RX ADMIN — HYDROCODONE BITARTRATE AND ACETAMINOPHEN 1 TABLET: 5; 325 TABLET ORAL at 09:42

## 2021-08-09 RX ADMIN — FENTANYL CITRATE 50 MCG: 50 INJECTION INTRAMUSCULAR; INTRAVENOUS at 09:25

## 2021-08-09 RX ADMIN — Medication 100 MCG: at 08:57

## 2021-08-09 RX ADMIN — HYDROMORPHONE HYDROCHLORIDE 0.5 MG: 1 INJECTION, SOLUTION INTRAMUSCULAR; INTRAVENOUS; SUBCUTANEOUS at 09:20

## 2021-08-09 RX ADMIN — CEFTRIAXONE SODIUM 2 G: 2 INJECTION, SOLUTION INTRAVENOUS at 14:57

## 2021-08-09 RX ADMIN — LIDOCAINE HYDROCHLORIDE 50 MG: 10 INJECTION, SOLUTION INFILTRATION; PERINEURAL at 08:33

## 2021-08-09 RX ADMIN — PROPOFOL 250 MG: 10 INJECTION, EMULSION INTRAVENOUS at 08:33

## 2021-08-09 RX ADMIN — ONDANSETRON 4 MG: 2 INJECTION INTRAMUSCULAR; INTRAVENOUS at 08:40

## 2021-08-09 RX ADMIN — FENTANYL CITRATE 50 MCG: 50 INJECTION INTRAMUSCULAR; INTRAVENOUS at 10:12

## 2021-08-09 RX ADMIN — EPHEDRINE SULFATE 5 MG: 50 INJECTION INTRAVENOUS at 08:46

## 2021-08-09 RX ADMIN — OXYCODONE HYDROCHLORIDE AND ACETAMINOPHEN 1 TABLET: 7.5; 325 TABLET ORAL at 21:41

## 2021-08-09 RX ADMIN — FAMOTIDINE 20 MG: 20 TABLET ORAL at 06:43

## 2021-08-09 RX ADMIN — CYCLOBENZAPRINE 10 MG: 10 TABLET, FILM COATED ORAL at 11:53

## 2021-08-09 RX ADMIN — GLYCOPYRROLATE 0.2 MG: 0.2 INJECTION INTRAMUSCULAR; INTRAVENOUS at 08:48

## 2021-08-09 RX ADMIN — SODIUM CHLORIDE: 9 INJECTION, SOLUTION INTRAVENOUS at 08:46

## 2021-08-09 RX ADMIN — FENTANYL CITRATE 50 MCG: 50 INJECTION INTRAMUSCULAR; INTRAVENOUS at 10:25

## 2021-08-09 RX ADMIN — SODIUM CHLORIDE, POTASSIUM CHLORIDE, SODIUM LACTATE AND CALCIUM CHLORIDE 75 ML/HR: 600; 310; 30; 20 INJECTION, SOLUTION INTRAVENOUS at 13:17

## 2021-08-09 RX ADMIN — VANCOMYCIN HYDROCHLORIDE 1750 MG: 1 INJECTION, POWDER, LYOPHILIZED, FOR SOLUTION INTRAVENOUS at 08:50

## 2021-08-09 RX ADMIN — DOCUSATE SODIUM 100 MG: 100 CAPSULE, LIQUID FILLED ORAL at 21:41

## 2021-08-09 RX ADMIN — Medication 50 MCG: at 08:52

## 2021-08-09 RX ADMIN — VANCOMYCIN HYDROCHLORIDE 1500 MG: 100 INJECTION, POWDER, LYOPHILIZED, FOR SOLUTION INTRAVENOUS at 23:27

## 2021-08-09 RX ADMIN — EPHEDRINE SULFATE 5 MG: 50 INJECTION INTRAVENOUS at 08:57

## 2021-08-09 RX ADMIN — EPHEDRINE SULFATE 10 MG: 50 INJECTION INTRAVENOUS at 08:45

## 2021-08-09 RX ADMIN — CYCLOBENZAPRINE 10 MG: 10 TABLET, FILM COATED ORAL at 23:34

## 2021-08-09 RX ADMIN — SUGAMMADEX 200 MG: 100 INJECTION, SOLUTION INTRAVENOUS at 09:00

## 2021-08-09 RX ADMIN — FENTANYL CITRATE 100 MCG: 50 INJECTION, SOLUTION INTRAMUSCULAR; INTRAVENOUS at 08:30

## 2021-08-09 RX ADMIN — LIDOCAINE HYDROCHLORIDE 0.2 ML: 10 INJECTION, SOLUTION EPIDURAL; INFILTRATION; INTRACAUDAL; PERINEURAL at 06:31

## 2021-08-09 RX ADMIN — FAMOTIDINE 20 MG: 20 TABLET ORAL at 21:31

## 2021-08-09 RX ADMIN — FENTANYL CITRATE 50 MCG: 50 INJECTION INTRAMUSCULAR; INTRAVENOUS at 09:30

## 2021-08-09 RX ADMIN — GABAPENTIN 400 MG: 400 CAPSULE ORAL at 14:56

## 2021-08-09 RX ADMIN — MIDAZOLAM HYDROCHLORIDE 2 MG: 1 INJECTION, SOLUTION INTRAMUSCULAR; INTRAVENOUS at 08:30

## 2021-08-09 RX ADMIN — GABAPENTIN 400 MG: 400 CAPSULE ORAL at 21:31

## 2021-08-09 RX ADMIN — DEXAMETHASONE SODIUM PHOSPHATE 8 MG: 4 INJECTION, SOLUTION INTRA-ARTICULAR; INTRALESIONAL; INTRAMUSCULAR; INTRAVENOUS; SOFT TISSUE at 08:40

## 2021-08-09 RX ADMIN — Medication 50 MCG: at 08:48

## 2021-08-09 NOTE — ANESTHESIA PREPROCEDURE EVALUATION
Anesthesia Evaluation                  Airway   Mallampati: II  Dental      Pulmonary    Cardiovascular     (+) hypertension,       Neuro/Psych  GI/Hepatic/Renal/Endo    (+) obesity,   diabetes mellitus,     Musculoskeletal     Abdominal    Substance History      OB/GYN          Other                        Anesthesia Plan    ASA 3     general     intravenous induction     Anesthetic plan, all risks, benefits, and alternatives have been provided, discussed and informed consent has been obtained with: patient.

## 2021-08-09 NOTE — ANESTHESIA PROCEDURE NOTES
Airway  Urgency: elective    Date/Time: 8/9/2021 8:35 AM  Airway not difficult    General Information and Staff    Patient location during procedure: OR  CRNA: Kym Torres CRNA    Indications and Patient Condition  Indications for airway management: airway protection    Preoxygenated: yes  MILS not maintained throughout  Mask difficulty assessment: 2 - vent by mask + OA or adjuvant +/- NMBA    Final Airway Details  Final airway type: endotracheal airway      Successful airway: ETT  Cuffed: yes   Successful intubation technique: direct laryngoscopy  Facilitating devices/methods: intubating stylet  Endotracheal tube insertion site: oral  Blade: Rodas  Blade size: 3  ETT size (mm): 8.0  Cormack-Lehane Classification: grade I - full view of glottis  Placement verified by: chest auscultation and capnometry   Measured from: lips  ETT/EBT  to lips (cm): 22  Number of attempts at approach: 1  Assessment: lips, teeth, and gum same as pre-op and atraumatic intubation    Additional Comments  Negative epigastric sounds, Breath sound equal bilaterally with symmetric chest rise and fall

## 2021-08-09 NOTE — OP NOTE
NEUROSURGICAL OPERATIVE NOTE        PREOPERATIVE DIAGNOSIS:    Postop lumbar wound infection      POSTOPERATIVE DIAGNOSIS:  Same      PROCEDURE:  Lumbar wound incision and drainage      SURGEON:  Sreekanth Sorto M.D.      ASSISTANT: Flores Parker PA-C    PAC assisted with:   Suctioning   Retraction   Tying   Suturing   Closing   Application of dressing   Skilled neurosurgery PA assistance was necessary to perform this procedure.        ANESTHESIA:  General      ESTIMATED BLOOD LOSS: Minimal      SPECIMEN: Cultures to the lab      DRAINS: None      COMPLICATIONS:  None      CLINICAL NOTE:  The patient is a 41-year-old gentleman who on 6/24/2021 underwent uncomplicated right L3-4 discectomy.  He has had some low-grade wound drainage from a small punctate opening at the inferior pole of his wound.  Cultures demonstrated light growth of staph epidermidis.  He has been treated with oral agents but has had ongoing drainage.  Inflammatory markers have started to elevate.  An MRI study demonstrates subcutaneous fluid collection suspicious for abscess.  As such she is brought to surgery for wound I&D.      TECHNICAL NOTE:  The patient was brought to the operating room and while on his cart general endotracheal anesthesia was achieved.  He was then turned prone onto the Cloward saddle frame and special care was ensured to protect pressure points.  His low back was prepared and draped in the usual fashion.  The incision was opened and in the deep subcutaneous space purulent material was identified.  Cultures were obtained.  The fascia had dehisced.   Some blackish fluid extended down to the dura.  As the granulation tissue was removed, a blunt probe could easily be passed along the nerve root into the foramen.  With Valsalva maneuver there was no evidence of CSF leak.  The wound was then washed out with a saline solution.  Finger debridement was also utilized.  The fascia was then reapproximated in interrupted fashion  with 0 Vicryl suture.  Vancomycin powder was sprinkled in the depths of the wound and more superficially as the wound was closed.  The subcutaneous tissues were closed in layers with 3-0 Vicryl suture. The skin was closed in a running locked fashion with a 3-0 nylon suture.  Sterile dressing was applied.  After obtaining cultures vancomycin was administered.  The patient was subsequently taken to the recovery room in satisfactory condition.                   Sreekanth Sorto M.D.

## 2021-08-09 NOTE — PROGRESS NOTES
Pharmacy Consult-Vancomycin Dosing  Wenceslao Nava is a  41 y.o. male receiving vancomycin therapy.     Indication: surg ppx, ssti  Consulting Provider: Macario NGUYỄN Consult: lori    Goal AUC: 400 - 600 mg/L*hr    Current Antimicrobial Therapy  Anti-Infectives (From admission, onward)      Ordered     Dose/Rate Route Frequency Start Stop    08/09/21 0907  vancomycin 1750 mg/500 mL 0.9% NS IVPB ([facility])     Ordering Provider: Sreekanth Sorto MD    15 mg/kg × 119 kg  over 120 Minutes Intravenous Every 12 Hours 08/09/21 2100 08/14/21 2059    08/09/21 1408  Pharmacy to dose vancomycin     Ordering Provider: Yon Thakur, PharmD     Does not apply Continuous PRN 08/09/21 1407 08/14/21 1406    08/09/21 0907  cefTRIAXone (ROCEPHIN) IVPB 2 g     Ordering Provider: Sreekanth Sorto MD    2 g  100 mL/hr over 30 Minutes Intravenous Every 24 Hours 08/09/21 1400 08/14/21 1359    08/09/21 0823  vancomycin 1750 mg/500 mL 0.9% NS IVPB ([facility])     Ordering Provider: Sreekanth Sorto MD    15 mg/kg × 119 kg  over 105 Minutes Intravenous Once 08/09/21 0825 08/09/21 0850            Allergies  Allergies as of 08/06/2021    (No Known Allergies)       Labs    Results from last 7 days   Lab Units 08/06/21  1356   BUN mg/dL 15   CREATININE mg/dL 1.17       Results from last 7 days   Lab Units 08/04/21  0955   WBC 10*3/mm3 7.48       Evaluation of Dosing     Last Dose Received in the ED/Outside Facility: n/a  Is Patient on Dialysis or Renal Replacement: n/a    Ht - 185.4 cm  Wt - 119 kg    Estimated Creatinine Clearance: 112.2 mL/min (by C-G formula based on SCr of 1.17 mg/dL).    Intake & Output (last 3 days)         08/06 0701 - 08/07 0700 08/07 0701 - 08/08 0700 08/08 0701 - 08/09 0700 08/09 0701 - 08/10 0700    I.V.    1500    Total Intake    1500    Net    +1500                    Microbiology and Radiology  Microbiology Results (last 10 days)       Procedure Component Value - Date/Time    Wound Culture - Wound, Spine, Lumbar  [[id number]] Collected: 08/09/21 0848    Lab Status: Preliminary result Specimen: Wound from Spine, Lumbar Updated: 08/09/21 1320     Gram Stain Moderate (3+) WBCs seen      Few (2+) Gram positive cocci    COVID PRE-OP / PRE-PROCEDURE SCREENING ORDER (NO ISOLATION) - Swab, Nasopharynx [[id number]]  (Normal) Collected: 08/06/21 1356    Lab Status: Final result Specimen: Swab from Nasopharynx Updated: 08/06/21 1805    Narrative:      The following orders were created for panel order COVID PRE-OP / PRE-PROCEDURE SCREENING ORDER (NO ISOLATION) - Swab, Nasopharynx.  Procedure                               Abnormality         Status                     ---------                               -----------         ------                     COVID-19, APTIMA PANTHER...[[id number]]  Normal              Final result                 Please view results for these tests on the individual orders.    COVID-19, APTIMA PANTHER FAM IN-HOUSE NP/OP SWAB IN UTM/VTM/SALINE TRANSPORT MEDIA 24HR TAT - Swab, Nasopharynx [[id number]]  (Normal) Collected: 08/06/21 1356    Lab Status: Final result Specimen: Swab from Nasopharynx Updated: 08/06/21 1805     COVID19 Not Detected    Narrative:      Fact sheet for providers: https://www.fda.gov/media/293746/download     Fact sheet for patients: https://www.fda.gov/media/185463/download    Test performed by RT PCR.            Reported Vancomycin Levels                         Insight[employer] AUC Calculation:    Current AUC:    mg/L*hr    Predicted Steady State AUC on Current Dose: 579 mg/L*hr  _________________________________    Predicted Steady State AUC on New Dose:    mg/L*hr    Assessment/Plan:  Vanc 1750mg x 1 given prior to surg.  Will start vanc 1500mg q12h.  Will plan for a trough prior to the 5th dose.  Pharmacy will continue to follow.    Yon Thakur PharmD, BCPS  8/9/2021  14:18 EDT

## 2021-08-09 NOTE — INTERVAL H&P NOTE
[facility] Pre-op    Full history and physical note from office is attached.    Of note, he states he continues on 2 oral abx. No recent fevers, chills or night sweats. He has increased low back pain that radiates down BLE. He reports worsening urinary hesitancy and increased use of stool softeners and laxatives to have BM.    /93 (BP Location: Right arm, Patient Position: Lying)   Pulse 92   Temp 97.2 °F (36.2 °C) (Temporal)   Resp 18   SpO2 98%     Immunizations:  Influenza:  No  Pneumococcal:  No  Tetanus:  UTD  Covid : No    LAB Results:  Lab Results   Component Value Date    WBC 7.48 08/04/2021    HGB 12.6 (L) 08/04/2021    HCT 36.7 (L) 08/04/2021    MCV 85.9 08/04/2021     08/04/2021    NEUTROABS 5.74 08/04/2021    GLUCOSE 69 08/06/2021    BUN 15 08/06/2021    CREATININE 1.17 08/06/2021    EGFRIFNONA 69 08/06/2021     08/06/2021    K 3.8 08/06/2021    CL 99 08/06/2021    CO2 24.0 08/06/2021    MG 1.8 09/17/2020    CALCIUM 9.3 08/06/2021    ALBUMIN 4.50 08/06/2021    AST 17 08/06/2021    ALT 22 08/06/2021    BILITOT 0.3 08/06/2021    PTT 27.4 01/31/2014    INR 0.96 12/14/2016     Results for SENAIT NOYOLA (MRN [id number]) as of 8/9/2021 06:58   Ref. Range 7/27/2021 12:48 8/4/2021 09:55   C-Reactive Protein Latest Ref Range: 0.00 - 0.50 mg/dL 1.65 (H) 2.33 (H)     8/5/21 MRI lumbar:  IMPRESSION:     1. Interval post surgical changes from right-sided laminectomy and  partial discectomy at the L3/4 level with postsurgical related scar  tissue enhancement in the discectomy bed and enhancement along the  posterior surgical tract. Rim-enhancing fluid in the surgical bed  posteriorly subcutaneous tissues is 3.4 x 1.6 cm and may represent  seroma or hematoma. Infection felt less likely.  2. Underlying degenerative disc disease combined with hypertrophic facet  arthropathy and congenital shortening of the pedicles resulting in  multilevel stenosis as previously described.  3.  Stenosis again noted to be most pronounced at the L3/4 level and L4/5  levels.  4. No acute fracture or traumatic malalignment.    Cancer Staging (if applicable)  Cancer Patient: __ yes __no __unknown__N/A; If yes, clinical stage T:__ N:__M:__, stage group or __N/A      Impression: Wound infection       Plan: INCISION AND DRAINAGE WOUND      MAGED Macias   8/9/2021   07:02 EDT

## 2021-08-09 NOTE — PLAN OF CARE
Goal Outcome Evaluation:  Plan of Care Reviewed With: patient        Progress: improving   Patient arrived to the unit from PACU at aprox 1310. Has been able to ambulate with stand by assist. VSS on room air. Covaderm back dressing CDI scant amount of drainage. Has been able to void. Minimal complaints of pain. D.C day TBD per ID pt set to be on IV antibiotics for 2-6 weeks pending cultures.

## 2021-08-09 NOTE — CONSULTS
INFECTIOUS DISEASE CONSULT/INITIAL HOSPITAL VISIT    Wenceslao Nava  1980  [id number]    Date of Consult: 8/9/2021    Admission Date: 8/9/2021      Requesting Provider: Sreekanth Sorto MD  Evaluating Physician: Abelardo Hansen MD    Reason for Consultation: lumbar back wound infection    History of present illness:    Patient is a 41 y.o. male with L3-L4 discectomy on 6/24/2021 complicated by wound drainage patient had cultures at neurosurgery office that grew Staph epidermidis patient given oral antibiotics Bactrim, doxycycline without substantial improvement because of elevated inflammatory markers, MRI which showed subcutaneous fluid collection patient brought to hospital for surgery, washout.  Patient has had surgery today and has been started on broad-spectrum antibiotics of vancomycin and ceftriaxone.    Past Medical History:   Diagnosis Date   • Arthritis    • Diabetes mellitus (CMS/[facility])     po meds only    • Elevated cholesterol    • Hypertension    • Knee pain, left    • Wound infection after surgery 07/2021       Past Surgical History:   Procedure Laterality Date   • CHOLECYSTECTOMY     • LUMBAR DISCECTOMY Right 6/24/2021    Procedure: LAMINOTOMY, FORAMINOTOMY, DISCECTOMY, L3-4 RIGHT;  Surgeon: Sreekanth Sorto MD;  Location: [facility];  Service: Neurosurgery;  Laterality: Right;   • NECK SURGERY      May 2019: Believes it to be an ACDF, RAVINDRA Jackman       Family History   Problem Relation Age of Onset   • Diabetes Brother    • Hypertension Brother    • Diabetes Mother        Social History     Socioeconomic History   • Marital status: Single     Spouse name: Not on file   • Number of children: Not on file   • Years of education: Not on file   • Highest education level: Not on file   Tobacco Use   • Smoking status: Never Smoker   • Smokeless tobacco: Current User     Types: Snuff   Vaping Use   • Vaping Use: Never used   Substance and Sexual Activity   • Alcohol use: Not  Currently     Comment: RARELY   • Drug use: No   • Sexual activity: Defer       No Known Allergies      Medication:    Current Facility-Administered Medications:   •  [START ON 8/11/2021] !Vancomycin trough 8/11 @ 0830. Please wait for evaluation by pharmacist., , Does not apply, Once, Yon Thakur, PharmD  •  acetaminophen (TYLENOL) tablet 650 mg, 650 mg, Oral, Q4H PRN, Sreekanth Sorto MD, 650 mg at 08/09/21 0950  •  [START ON 8/10/2021] aspirin EC tablet 81 mg, 81 mg, Oral, Daily, Sreekanth Sorto MD  •  [START ON 8/10/2021] atorvastatin (LIPITOR) tablet 20 mg, 20 mg, Oral, Daily, Sreekanth Sorto MD  •  bisacodyl (DULCOLAX) suppository 10 mg, 10 mg, Rectal, Daily PRN, Sreekanth Sorto MD  •  cefTRIAXone (ROCEPHIN) IVPB 2 g, 2 g, Intravenous, Q24H, Sreekanth Sorto MD, Last Rate: 100 mL/hr at 08/09/21 1457, 2 g at 08/09/21 1457  •  cyclobenzaprine (FLEXERIL) tablet 10 mg, 10 mg, Oral, TID PRN, Sreekanth Sorto MD, 10 mg at 08/09/21 1153  •  diphenhydrAMINE (BENADRYL) capsule 25 mg, 25 mg, Oral, Nightly PRN, Sreekanth Sorto MD  •  docusate sodium (COLACE) capsule 100 mg, 100 mg, Oral, TID PRN, Sreekanth Sorto MD  •  famotidine (PEPCID) injection 20 mg, 20 mg, Intravenous, Q12H **OR** famotidine (PEPCID) tablet 20 mg, 20 mg, Oral, Q12H, Sreekanth Sorto MD  •  [START ON 8/10/2021] FLUoxetine (PROzac) capsule 40 mg, 40 mg, Oral, Daily, Sreekanth Sorto MD  •  gabapentin (NEURONTIN) capsule 400 mg, 400 mg, Oral, TID, Sreekanth Sorto MD, 400 mg at 08/09/21 1456  •  lactated ringers infusion, 9 mL/hr, Intravenous, Continuous, Sreekanth Sorto MD, Stopped at 08/09/21 0846  •  lactated ringers infusion, 75 mL/hr, Intravenous, Continuous, Sreekanth Sorto MD, Last Rate: 75 mL/hr at 08/09/21 1317, 75 mL/hr at 08/09/21 1317  •  [START ON 8/10/2021] lisinopril (PRINIVIL,ZESTRIL) 10 mg, hydroCHLOROthiazide (HYDRODIURIL) 12.5 mg for ZESTORETIC 10-12.5, , Oral, Q24H, Sreekanth Sorto MD  •  magnesium  hydroxide (MILK OF MAGNESIA) suspension 2400 mg/10mL 10 mL, 10 mL, Oral, Daily PRN, Sreekanth Sorto MD  •  metFORMIN (GLUCOPHAGE) tablet 750 mg, 750 mg, Oral, BID With Meals, Sreekanth Sorto MD  •  Morphine sulfate (PF) injection 4 mg, 4 mg, Intravenous, Q4H PRN **AND** naloxone (NARCAN) injection 0.4 mg, 0.4 mg, Intravenous, Q5 Min PRN, Sreekanth Sorto MD  •  naproxen (NAPROSYN) tablet 500 mg, 500 mg, Oral, BID PRN, Sreekanth Sorto MD  •  ondansetron (ZOFRAN) tablet 4 mg, 4 mg, Oral, Q6H PRN **OR** ondansetron (ZOFRAN) injection 4 mg, 4 mg, Intravenous, Q6H PRN, Sreekanth Sorto MD  •  oxyCODONE-acetaminophen (PERCOCET) 7.5-325 MG per tablet 1 tablet, 1 tablet, Oral, Q4H PRN, Sreekanth Sorto MD  •  Pharmacy to dose vancomycin, , Does not apply, Continuous PRN, Yon Thakur PharmD  •  sodium chloride 0.9 % flush 10 mL, 10 mL, Intravenous, PRN, Sreekanth Sorto MD  •  sodium chloride 0.9 % flush 3 mL, 3 mL, Intravenous, Q12H, Sreekanth Sorto MD  •  vancomycin 1500 mg/500 mL 0.9% NS IVPB ([facility]), 1,500 mg, Intravenous, Q12H, Yon Thakur PharmD    Antibiotics:  Anti-Infectives (From admission, onward)    Ordered     Dose/Rate Route Frequency Start Stop    08/09/21 1427  vancomycin 1500 mg/500 mL 0.9% NS IVPB ([facility])     Ordering Provider: Yon Thakur PharmD    1,500 mg  over 120 Minutes Intravenous Every 12 Hours 08/09/21 2100 08/14/21 2059 08/09/21 1408  Pharmacy to dose vancomycin     Ordering Provider: Yon Thakur PharmD     Does not apply Continuous PRN 08/09/21 1407 08/14/21 1406    08/09/21 0907  cefTRIAXone (ROCEPHIN) IVPB 2 g     Ordering Provider: Sreekanth Sorto MD    2 g  100 mL/hr over 30 Minutes Intravenous Every 24 Hours 08/09/21 1400 08/14/21 1359    08/09/21 0823  vancomycin 1750 mg/500 mL 0.9% NS IVPB ([facility])     Ordering Provider: Sreekanth Sorto MD    15 mg/kg × 119 kg  over 105 Minutes Intravenous Once 08/09/21 0825 08/09/21 0850             Review of Systems:  Remarkable for fatigue, constipation, back pain, weakness, numbness    Rest of review of systems were reviewed and were unremarkable      Physical Exam:   Vital Signs  Temp (24hrs), Av.3 °F (36.8 °C), Min:97.2 °F (36.2 °C), Max:98.8 °F (37.1 °C)    Temp  Min: 97.2 °F (36.2 °C)  Max: 98.8 °F (37.1 °C)  BP  Min: 100/47  Max: 147/93  Pulse  Min: 86  Max: 102  Resp  Min: 16  Max: 20  SpO2  Min: 90 %  Max: 98 %    GENERAL: Awake and alert, in no acute distress.   HEENT: Normocephalic, atraumatic.  PERRL. EOMI. No conjunctival injection. No icterus. Oropharynx clear without evidence of thrush or exudate. No evidence of peridontal disease.      HEART: RRR; No murmur, rubs, gallops.   LUNGS: Clear to auscultation bilaterally without wheezing, rales, rhonchi. Normal respiratory effort. Nonlabored. No dullness.  ABDOMEN: Soft, nontender, nondistended. Positive bowel sounds. No rebound or guarding. NO mass or HSM.  EXT:  No cyanosis, clubbing or edema. No cord.  :  Without Petit catheter.  MSK: No joint deformity  SKIN: Warm and dry without cutaneous eruptions on Inspection/palpation.    NEURO: Oriented to PPT.  PSYCHIATRIC: Normal insight and judgement. Cooperative with PE    Laboratory Data    Results from last 7 days   Lab Units 21  0955   WBC 10*3/mm3 7.48   HEMOGLOBIN g/dL 12.6*   HEMATOCRIT % 36.7*   PLATELETS 10*3/mm3 334     Results from last 7 days   Lab Units 21  1356   SODIUM mmol/L 136   POTASSIUM mmol/L 3.8   CHLORIDE mmol/L 99   CO2 mmol/L 24.0   BUN mg/dL 15   CREATININE mg/dL 1.17   GLUCOSE mg/dL 69   CALCIUM mg/dL 9.3     Results from last 7 days   Lab Units 21  1356   ALK PHOS U/L 102   BILIRUBIN mg/dL 0.3   ALT (SGPT) U/L 22   AST (SGOT) U/L 17     Results from last 7 days   Lab Units 21  0955   SED RATE mm/hr 42*     Results from last 7 days   Lab Units 21  0955   CRP mg/dL 2.33*                 Estimated Creatinine Clearance: 112.2 mL/min (by  C-G formula based on SCr of 1.17 mg/dL).      Microbiology:  No results found for: ACANTHNAEG, AFBCX, BPERTUSSISCX, BLOODCX  No results found for: BCIDPCR, CXREFLEX, CSFCX, CULTURETIS  No results found for: CULTURES, HSVCX, URCX  No results found for: EYECULTURE, GCCX, HSVCULTURE, LABHSV  No results found for: LEGIONELLA, MRSACX, MUMPSCX, MYCOPLASCX  No results found for: NOCARDIACX, STOOLCX  No results found for: THROATCX, UNSTIMCULT, URINECX, CULTURE, VZVCULTUR  No results found for: VIRALCULTU, WOUNDCX        Radiology:  Imaging Results (Last 72 Hours)     ** No results found for the last 72 hours. **            Impression:   Lumbar wound infection  Status post L3-L4 discectomy  Back pain  PLAN/RECOMMENDATIONS:   Thank you for asking us to see Wenceslao Nava, I recommend the following:  Continue vancomycin per pharmacy dosing    Continue Rocephin 2 g IV daily    Follow-up operative cultures    Anticipate 2 to 6-week course of IV antibiotics upon discharge       Abelardo Hansen MD  8/9/2021  15:54 EDT

## 2021-08-10 LAB
ANION GAP SERPL CALCULATED.3IONS-SCNC: 10 MMOL/L (ref 5–15)
BUN SERPL-MCNC: 13 MG/DL (ref 6–20)
BUN/CREAT SERPL: 14.3 (ref 7–25)
CALCIUM SPEC-SCNC: 9.2 MG/DL (ref 8.6–10.5)
CHLORIDE SERPL-SCNC: 103 MMOL/L (ref 98–107)
CO2 SERPL-SCNC: 25 MMOL/L (ref 22–29)
CREAT SERPL-MCNC: 0.91 MG/DL (ref 0.76–1.27)
GFR SERPL CREATININE-BSD FRML MDRD: 92 ML/MIN/1.73
GLUCOSE BLDC GLUCOMTR-MCNC: 107 MG/DL (ref 70–130)
GLUCOSE BLDC GLUCOMTR-MCNC: 121 MG/DL (ref 70–130)
GLUCOSE BLDC GLUCOMTR-MCNC: 130 MG/DL (ref 70–130)
GLUCOSE BLDC GLUCOMTR-MCNC: 93 MG/DL (ref 70–130)
GLUCOSE SERPL-MCNC: 119 MG/DL (ref 65–99)
POTASSIUM SERPL-SCNC: 3.8 MMOL/L (ref 3.5–5.2)
SODIUM SERPL-SCNC: 138 MMOL/L (ref 136–145)

## 2021-08-10 PROCEDURE — G0378 HOSPITAL OBSERVATION PER HR: HCPCS

## 2021-08-10 PROCEDURE — 97116 GAIT TRAINING THERAPY: CPT

## 2021-08-10 PROCEDURE — 97161 PT EVAL LOW COMPLEX 20 MIN: CPT

## 2021-08-10 PROCEDURE — 82962 GLUCOSE BLOOD TEST: CPT

## 2021-08-10 PROCEDURE — 25010000003 CEFTRIAXONE PER 250 MG: Performed by: NEUROLOGICAL SURGERY

## 2021-08-10 PROCEDURE — 99024 POSTOP FOLLOW-UP VISIT: CPT | Performed by: NEUROLOGICAL SURGERY

## 2021-08-10 PROCEDURE — 25010000002 MORPHINE PER 10 MG: Performed by: NEUROLOGICAL SURGERY

## 2021-08-10 PROCEDURE — 25010000002 HEPARIN (PORCINE) PER 1000 UNITS: Performed by: NEUROLOGICAL SURGERY

## 2021-08-10 PROCEDURE — 80048 BASIC METABOLIC PNL TOTAL CA: CPT

## 2021-08-10 PROCEDURE — 25010000002 VANCOMYCIN 10 G RECONSTITUTED SOLUTION

## 2021-08-10 PROCEDURE — 0 CEFTRIAXONE PER 250 MG: Performed by: NEUROLOGICAL SURGERY

## 2021-08-10 RX ORDER — HEPARIN SODIUM 5000 [USP'U]/ML
5000 INJECTION, SOLUTION INTRAVENOUS; SUBCUTANEOUS EVERY 8 HOURS SCHEDULED
Status: DISCONTINUED | OUTPATIENT
Start: 2021-08-10 | End: 2021-08-12 | Stop reason: HOSPADM

## 2021-08-10 RX ADMIN — CEFTRIAXONE SODIUM 2 G: 2 INJECTION, SOLUTION INTRAVENOUS at 14:33

## 2021-08-10 RX ADMIN — METFORMIN HYDROCHLORIDE 750 MG: 500 TABLET ORAL at 08:26

## 2021-08-10 RX ADMIN — OXYCODONE HYDROCHLORIDE AND ACETAMINOPHEN 1 TABLET: 7.5; 325 TABLET ORAL at 08:27

## 2021-08-10 RX ADMIN — HYDROCHLOROTHIAZIDE: 12.5 CAPSULE ORAL at 08:27

## 2021-08-10 RX ADMIN — VANCOMYCIN HYDROCHLORIDE 1500 MG: 100 INJECTION, POWDER, LYOPHILIZED, FOR SOLUTION INTRAVENOUS at 20:57

## 2021-08-10 RX ADMIN — HEPARIN SODIUM 5000 UNITS: 5000 INJECTION INTRAVENOUS; SUBCUTANEOUS at 08:27

## 2021-08-10 RX ADMIN — OXYCODONE HYDROCHLORIDE AND ACETAMINOPHEN 1 TABLET: 7.5; 325 TABLET ORAL at 16:21

## 2021-08-10 RX ADMIN — ATORVASTATIN CALCIUM 20 MG: 20 TABLET, FILM COATED ORAL at 08:26

## 2021-08-10 RX ADMIN — GABAPENTIN 400 MG: 400 CAPSULE ORAL at 16:21

## 2021-08-10 RX ADMIN — FAMOTIDINE 20 MG: 20 TABLET ORAL at 20:57

## 2021-08-10 RX ADMIN — CYCLOBENZAPRINE 10 MG: 10 TABLET, FILM COATED ORAL at 17:09

## 2021-08-10 RX ADMIN — OXYCODONE HYDROCHLORIDE AND ACETAMINOPHEN 1 TABLET: 7.5; 325 TABLET ORAL at 12:44

## 2021-08-10 RX ADMIN — MORPHINE SULFATE 4 MG: 4 INJECTION, SOLUTION INTRAMUSCULAR; INTRAVENOUS at 18:34

## 2021-08-10 RX ADMIN — METFORMIN HYDROCHLORIDE 750 MG: 500 TABLET ORAL at 17:06

## 2021-08-10 RX ADMIN — VANCOMYCIN HYDROCHLORIDE 1500 MG: 100 INJECTION, POWDER, LYOPHILIZED, FOR SOLUTION INTRAVENOUS at 08:27

## 2021-08-10 RX ADMIN — HEPARIN SODIUM 5000 UNITS: 5000 INJECTION INTRAVENOUS; SUBCUTANEOUS at 20:57

## 2021-08-10 RX ADMIN — ASPIRIN 81 MG: 81 TABLET, COATED ORAL at 08:26

## 2021-08-10 RX ADMIN — MORPHINE SULFATE 4 MG: 4 INJECTION, SOLUTION INTRAMUSCULAR; INTRAVENOUS at 00:39

## 2021-08-10 RX ADMIN — FLUOXETINE HYDROCHLORIDE 40 MG: 20 CAPSULE ORAL at 08:27

## 2021-08-10 RX ADMIN — FAMOTIDINE 20 MG: 20 TABLET ORAL at 08:26

## 2021-08-10 RX ADMIN — GABAPENTIN 400 MG: 400 CAPSULE ORAL at 08:26

## 2021-08-10 RX ADMIN — GABAPENTIN 400 MG: 400 CAPSULE ORAL at 20:57

## 2021-08-10 RX ADMIN — OXYCODONE HYDROCHLORIDE AND ACETAMINOPHEN 1 TABLET: 7.5; 325 TABLET ORAL at 04:55

## 2021-08-10 RX ADMIN — SODIUM CHLORIDE, PRESERVATIVE FREE 3 ML: 5 INJECTION INTRAVENOUS at 20:57

## 2021-08-10 RX ADMIN — MORPHINE SULFATE 4 MG: 4 INJECTION, SOLUTION INTRAMUSCULAR; INTRAVENOUS at 10:44

## 2021-08-10 RX ADMIN — OXYCODONE HYDROCHLORIDE AND ACETAMINOPHEN 1 TABLET: 7.5; 325 TABLET ORAL at 20:57

## 2021-08-10 NOTE — THERAPY EVALUATION
Patient Name: Wenceslao Nava  : 1980    MRN: [id number]                              Today's Date: 8/10/2021       Admit Date: 2021    Visit Dx:     ICD-10-CM ICD-9-CM   1. Wound infection  T14.8XXA 958.3    L08.9      Patient Active Problem List   Diagnosis   • Hypertension   • Arthritis   • Chronic low back pain with bilateral sciatica   • DDD (degenerative disc disease), lumbar   • Spinal stenosis, lumbar region, with neurogenic claudication   • Morbid (severe) obesity due to excess calories (CMS/HCC)   • Physical deconditioning   • Cervical spondylosis without myelopathy   • HNP (herniated nucleus pulposus), lumbar   • Wound infection     Past Medical History:   Diagnosis Date   • Arthritis    • Diabetes mellitus (CMS/HCC)     po meds only    • Elevated cholesterol    • Hypertension    • Knee pain, left    • Wound infection after surgery 2021     Past Surgical History:   Procedure Laterality Date   • CHOLECYSTECTOMY     • LUMBAR DISCECTOMY Right 2021    Procedure: LAMINOTOMY, FORAMINOTOMY, DISCECTOMY, L3-4 RIGHT;  Surgeon: Sreekanth Sorto MD;  Location: [facility] OR;  Service: Neurosurgery;  Laterality: Right;   • LUMBAR DISCECTOMY N/A 2021    Procedure: INCISION AND DRAINAGE WOUND;  Surgeon: Sreekanth Sorto MD;  Location: [facility] OR;  Service: Neurosurgery;  Laterality: N/A;   • NECK SURGERY      May 2019: Believes it to be an ACDF, RAVINDRA Jackman     General Information     Row Name 08/10/21 0940          Physical Therapy Time and Intention    Document Type  evaluation  -RODGER     Mode of Treatment  individual therapy;physical therapy  -RODGER     Row Name 08/10/21 0940          General Information    Patient Profile Reviewed  yes  -RODGER     Prior Level of Function  min assist:;all household mobility;transfer;bed mobility;ADL's  -RODGER     Existing Precautions/Restrictions  spinal  -RODGER     Barriers to Rehab  none identified  -RODGER     Row Name 08/10/21 0940          Living Environment     Lives With  significant other  -     Row Name 08/10/21 0940          Home Main Entrance    Number of Stairs, Main Entrance  two  -RODGER     Stair Railings, Main Entrance  railing on left side (ascending)  -     Row Name 08/10/21 0940          Stairs Within Home, Primary    Stairs, Within Home, Primary  0  -RODGER     Number of Stairs, Within Home, Primary  none  -RODGER     Row Name 08/10/21 0940          Cognition    Orientation Status (Cognition)  oriented x 4  -RODGER     Row Name 08/10/21 0940          Safety Issues, Functional Mobility    Safety Issues Affecting Function (Mobility)  safety precaution awareness;safety precautions follow-through/compliance  -     Impairments Affecting Function (Mobility)  endurance/activity tolerance;strength;pain;range of motion (ROM)  -       User Key  (r) = Recorded By, (t) = Taken By, (c) = Cosigned By    Initials Name Provider Type    Sandeep Gamboa, PT Physical Therapist        Mobility     Row Name 08/10/21 0940          Bed Mobility    Bed Mobility  rolling right;sidelying-sit  -     Rolling Right [place] (Bed Mobility)  verbal cues;standby assist  -     Sidelying-Sit [place] (Bed Mobility)  verbal cues;standby assist  -     Assistive Device (Bed Mobility)  bed rails;head of bed elevated  -     Comment (Bed Mobility)  Verbal cues for use of logroll technique  -     Row Name 08/10/21 0940          Transfers    Comment (Transfers)  Verbal cues for safe hand placement during standing/sitting and maintaining spinal precautions  -     Row Name 08/10/21 0940          Sit-Stand Transfer    Sit-Stand [place] (Transfers)  verbal cues;supervision  -     Assistive Device (Sit-Stand Transfers)  walker, front-wheeled  -     Row Name 08/10/21 0940          Gait/Stairs (Locomotion)    [place] Level (Gait)  verbal cues;contact guard  -     Assistive Device (Gait)  walker, front-wheeled  -     Distance in Feet (Gait)  180 feet  -      Deviations/Abnormal Patterns (Gait)  bilateral deviations;[place] decreased;gait speed decreased;stride length decreased  -     Bilateral Gait Deviations  forward flexed posture  -     [place] Level (Stairs)  not tested  -     Comment (Gait/Stairs)  Pt ambulated with step through pattern and decreased speed. Verbal cues for safe maintaining upright posture, body within walker, increase step length, and WB through LEs. Gait limited by pain.  -       User Key  (r) = Recorded By, (t) = Taken By, (c) = Cosigned By    Initials Name Provider Type    RODGER Sandeep Rollins, PT Physical Therapist        Obj/Interventions     Row Name 08/10/21 0940          Range of Motion Comprehensive    General Range of Motion  bilateral lower extremity ROM WFL  -[facility] Name 08/10/21 0940          Strength Comprehensive (MMT)    General Manual Muscle Testing (MMT) Assessment  lower extremity strength deficits identified  -     Comment, General Manual Muscle Testing (MMT) Assessment  bilateral LEs functionally 4+/5  -[facility] Name 08/10/21 0940          Motor Skills    Therapeutic Exercise  hip;knee;ankle;other (see comments) ab sets  -[facility] Name 08/10/21 0940          Hip (Therapeutic Exercise)    Hip (Therapeutic Exercise)  isometric exercises;AROM (active range of motion)  -     Hip AROM (Therapeutic Exercise)  bilateral;external rotation;internal rotation;10 repetitions  -     Hip Isometrics (Therapeutic Exercise)  gluteal sets;10 repetitions  -[facility] Name 08/10/21 0940          Knee (Therapeutic Exercise)    Knee (Therapeutic Exercise)  isometric exercises  -     Knee Isometrics (Therapeutic Exercise)  quad sets;10 repetitions  -[facility] Name 08/10/21 0940          Ankle (Therapeutic Exercise)    Ankle (Therapeutic Exercise)  AROM (active range of motion)  -     Ankle AROM (Therapeutic Exercise)  bilateral;dorsiflexion;plantarflexion;10 repetitions  -[facility] Name 08/10/21 0940          Sensory  Assessment (Somatosensory)    Sensory Assessment (Somatosensory)  LE sensation intact  -RODGER       User Key  (r) = Recorded By, (t) = Taken By, (c) = Cosigned By    Initials Name Provider Type    Sandeep Gamboa, PT Physical Therapist        Goals/Plan     Row Name 08/10/21 0940          Bed Mobility Goal 1 (PT)    Activity/Assistive Device (Bed Mobility Goal 1, PT)  sit to supine/supine to sit  -RODGER     [place] Level/Cues Needed (Bed Mobility Goal 1, PT)  independent  -RODGER     Time Frame (Bed Mobility Goal 1, PT)  long term goal (LTG);5 days  -RODGER     Row Name 08/10/21 0940          Transfer Goal 1 (PT)    Activity/Assistive Device (Transfer Goal 1, PT)  sit-to-stand/stand-to-sit;walker, rolling  -RODGER     [place] Level/Cues Needed (Transfer Goal 1, PT)  modified independence  -RODGER     Time Frame (Transfer Goal 1, PT)  long term goal (LTG);5 days  -RODGER     Row Name 08/10/21 0940          Gait Training Goal 1 (PT)    Activity/Assistive Device (Gait Training Goal 1, PT)  gait (walking locomotion);walker, rolling  -RODGER     [place] Level (Gait Training Goal 1, PT)  modified independence  -RODGER     Distance (Gait Training Goal 1, PT)  300 feet  -RODGER     Time Frame (Gait Training Goal 1, PT)  long term goal (LTG);5 days  -RODGER     Row Name 08/10/21 0940          Stairs Goal 1 (PT)    Activity/Assistive Device (Stairs Goal 1, PT)  stairs, all skills;using handrail, left;cane, straight  -RODGER     [place] Level/Cues Needed (Stairs Goal 1, PT)  contact guard assist  -RODGER     Number of Stairs (Stairs Goal 1, PT)  2  -RODGER     Time Frame (Stairs Goal 1, PT)  long term goal (LTG);5 days  -RODGER       User Key  (r) = Recorded By, (t) = Taken By, (c) = Cosigned By    Initials Name Provider Type    Sandeep Gamboa, PT Physical Therapist        Clinical Impression     Row Name 08/10/21 0940          Pain    Additional Documentation  Pain Scale: Numbers Pre/Post-Treatment (Group)  -RODGER     Row Name 08/10/21 0940          Pain Scale: Numbers  Pre/Post-Treatment    Pretreatment Pain Rating  6/10  -RODGER     Posttreatment Pain Rating  8/10  -RODGER     Pain Location - Orientation  incisional  -RODGER     Pain Location  back  -RODGER     Pain Intervention(s)  Repositioned;Ambulation/increased activity  -RODGER     Row Name 08/10/21 0940          Therapy Assessment/Plan (PT)    Patient/Family Therapy Goals Statement (PT)  To return home  -RODGER     Rehab Potential (PT)  good, to achieve stated therapy goals  -RODGER     Criteria for Skilled Interventions Met (PT)  yes;meets criteria;skilled treatment is necessary  -RODGER     Row Name 08/10/21 0940          Positioning and Restraints    Pre-Treatment Position  in bed  -RODGER     Post Treatment Position  chair  -RODGER     In Chair  notified nsg;reclined;call light within reach;encouraged to call for assist;exit alarm on;with family/caregiver;legs elevated  -       User Key  (r) = Recorded By, (t) = Taken By, (c) = Cosigned By    Initials Name Provider Type    Sandeep Gamboa, PT Physical Therapist        Outcome Measures     Row Name 08/10/21 0940          How much help from another person do you currently need...    Turning from your back to your side while in flat bed without using bedrails?  4  -RODGER     Moving from lying on back to sitting on the side of a flat bed without bedrails?  4  -RODGER     Moving to and from a bed to a chair (including a wheelchair)?  4  -RODGER     Standing up from a chair using your arms (e.g., wheelchair, bedside chair)?  4  -RODGER     Climbing 3-5 steps with a railing?  3  -RODGER     To walk in hospital room?  3  -RODGER     AM-PAC 6 Clicks Score (PT)  22  -RODGER     Row Name 08/10/21 0940          Functional Assessment    Outcome Measure Options  AM-PAC 6 Clicks Basic Mobility (PT)  -       User Key  (r) = Recorded By, (t) = Taken By, (c) = Cosigned By    Initials Name Provider Type    Sandeep Gamboa PT Physical Therapist                       Physical Therapy Education                 Title: PT OT SLP Therapies (Done)     Topic:  Physical Therapy (Done)     Point: Mobility training (Done)     Learning Progress Summary           Patient Acceptance, E,D, VU by RODGER at 8/10/2021 0940    Comment: Educated on safe sequencing with bed mobility, ambulatory transfers, and gait. Reviewed HEP and spinal precautions.                   Point: Home exercise program (Done)     Learning Progress Summary           Patient Acceptance, E,D, VU by RODGER at 8/10/2021 0940    Comment: Educated on safe sequencing with bed mobility, ambulatory transfers, and gait. Reviewed HEP and spinal precautions.                   Point: Body mechanics (Done)     Learning Progress Summary           Patient Acceptance, E,D, VU by RODGER at 8/10/2021 0940    Comment: Educated on safe sequencing with bed mobility, ambulatory transfers, and gait. Reviewed HEP and spinal precautions.                   Point: Precautions (Done)     Learning Progress Summary           Patient Acceptance, E,D, VU by RODGER at 8/10/2021 0940    Comment: Educated on safe sequencing with bed mobility, ambulatory transfers, and gait. Reviewed HEP and spinal precautions.                               User Key     Initials Effective Dates Name Provider Type Discipline     06/16/21 -  Sandeep Rollins, PT Physical Therapist PT              PT Recommendation and Plan  Planned Therapy Interventions (PT): balance training, bed mobility training, gait training, home exercise program, patient/family education, transfer training, stair training, strengthening  Plan of Care Reviewed With: patient  Progress: improving  Outcome Summary: PT eval complete. Pt ambulated 180 feet using RW and CGA. Gait limited by pain. Bed mobility performed with SBA and STS with supervision. Reviewed HEP, spinal precautions, and logroll technique. Recommend pt d/c home with assist when appropriate.     Time Calculation:   PT Charges     Row Name 08/10/21 0940             Time Calculation    Start Time  0940  -      PT Received On  08/10/21  -RODGER       PT Goal Re-Cert Due Date  08/20/21  -RODGER         Time Calculation- PT    Total Timed Code Minutes- PT  12 minute(s)  -RODGER         Timed Charges    46225 - PT Therapeutic Exercise Minutes  4  -RODGER      67296 - Gait Training Minutes   8  -RODGER         Untimed Charges    PT Eval/Re-eval Minutes  32  -RODGER         Total Minutes    Timed Charges Total Minutes  12  -RODGER      Untimed Charges Total Minutes  32  -RODGER       Total Minutes  44  -RODGER        User Key  (r) = Recorded By, (t) = Taken By, (c) = Cosigned By    Initials Name Provider Type    Sandeep Gamboa, PT Physical Therapist        Therapy Charges for Today     Code Description Service Date Service Provider Modifiers Qty    07136579050 HC GAIT TRAINING EA 15 MIN 8/10/2021 Sandeep Rollins, PT GP 1    06678734299 HC PT EVAL LOW COMPLEXITY 3 8/10/2021 Sandeep Rollins, PT GP 1          PT G-Codes  Outcome Measure Options: AM-PAC 6 Clicks Basic Mobility (PT)  AM-PAC 6 Clicks Score (PT): 22    Sandeep Rollins PT  8/10/2021

## 2021-08-10 NOTE — CONSULTS
INFECTIOUS DISEASE CONSULT/INITIAL HOSPITAL VISIT    Wenceslao Nava  1980  [id number]    Date of Consult: 8/10/2021    Admission Date: 8/9/2021      Requesting Provider: Sreekanth Sorto MD  Evaluating Physician: Abelardo Hansen MD    Reason for Consultation: lumbar back wound infection    History of present illness:    Patient is a 41 y.o. male with L3-L4 discectomy on 6/24/2021 complicated by wound drainage patient had cultures at neurosurgery office that grew Staph epidermidis patient given oral antibiotics Bactrim, doxycycline without substantial improvement because of elevated inflammatory markers, MRI which showed subcutaneous fluid collection patient brought to hospital for surgery, washout.  Patient has had surgery today and has been started on broad-spectrum antibiotics of vancomycin and ceftriaxone.    8/10/2021 still has some back pain no fevers rash sore throat is moving around better    Past Medical History:   Diagnosis Date   • Arthritis    • Diabetes mellitus (CMS/[facility])     po meds only    • Elevated cholesterol    • Hypertension    • Knee pain, left    • Wound infection after surgery 07/2021       Past Surgical History:   Procedure Laterality Date   • CHOLECYSTECTOMY     • LUMBAR DISCECTOMY Right 6/24/2021    Procedure: LAMINOTOMY, FORAMINOTOMY, DISCECTOMY, L3-4 RIGHT;  Surgeon: Sreekanth Sorto MD;  Location: [facility] OR;  Service: Neurosurgery;  Laterality: Right;   • LUMBAR DISCECTOMY N/A 8/9/2021    Procedure: INCISION AND DRAINAGE WOUND;  Surgeon: Sreekanth Sorto MD;  Location:  [facility] OR;  Service: Neurosurgery;  Laterality: N/A;   • NECK SURGERY      May 2019: Believes it to be an ACDF, RAVINDRA Jackman       Family History   Problem Relation Age of Onset   • Diabetes Brother    • Hypertension Brother    • Diabetes Mother        Social History     Socioeconomic History   • Marital status: Single     Spouse name: Not on file   • Number of children: Not on file   • Years of  education: Not on file   • Highest education level: Not on file   Tobacco Use   • Smoking status: Never Smoker   • Smokeless tobacco: Current User     Types: Snuff   Vaping Use   • Vaping Use: Never used   Substance and Sexual Activity   • Alcohol use: Not Currently     Comment: RARELY   • Drug use: No   • Sexual activity: Defer       No Known Allergies      Medication:    Current Facility-Administered Medications:   •  [START ON 8/11/2021] !Vancomycin trough 8/11 @ 0830. Please wait for evaluation by pharmacist., , Does not apply, Once, Yon Thakur, PharmD  •  acetaminophen (TYLENOL) tablet 650 mg, 650 mg, Oral, Q4H PRN, Sreekanth Sorto MD, 650 mg at 08/09/21 0950  •  aspirin EC tablet 81 mg, 81 mg, Oral, Daily, Sreekanth Sorto MD, 81 mg at 08/10/21 0826  •  atorvastatin (LIPITOR) tablet 20 mg, 20 mg, Oral, Daily, Sreekanth Sorto MD, 20 mg at 08/10/21 0826  •  bisacodyl (DULCOLAX) suppository 10 mg, 10 mg, Rectal, Daily PRN, Sreekanth Sorto MD  •  cefTRIAXone (ROCEPHIN) IVPB 2 g, 2 g, Intravenous, Q24H, Sreekanth Sorto MD, Last Rate: 100 mL/hr at 08/10/21 1433, 2 g at 08/10/21 1433  •  cyclobenzaprine (FLEXERIL) tablet 10 mg, 10 mg, Oral, TID PRN, Sreekanth Sorto MD, 10 mg at 08/09/21 2334  •  diphenhydrAMINE (BENADRYL) capsule 25 mg, 25 mg, Oral, Nightly PRN, Sreekanth Sorto MD  •  docusate sodium (COLACE) capsule 100 mg, 100 mg, Oral, TID PRN, Sreekanth Sorto MD, 100 mg at 08/09/21 2141  •  famotidine (PEPCID) injection 20 mg, 20 mg, Intravenous, Q12H **OR** famotidine (PEPCID) tablet 20 mg, 20 mg, Oral, Q12H, Sreekanth Sorto MD, 20 mg at 08/10/21 0826  •  FLUoxetine (PROzac) capsule 40 mg, 40 mg, Oral, Daily, Sreekanth Sorto MD, 40 mg at 08/10/21 0827  •  gabapentin (NEURONTIN) capsule 400 mg, 400 mg, Oral, TID, Srekeanth Sorto MD, 400 mg at 08/10/21 1621  •  heparin (porcine) 5000 UNIT/ML injection 5,000 Units, 5,000 Units, Subcutaneous, Q8H, Sreekanth Sorto MD, 5,000 Units at  08/10/21 0827  •  lactated ringers infusion, 9 mL/hr, Intravenous, Continuous, Sreekanth Sorto MD, Stopped at 08/09/21 0846  •  lisinopril (PRINIVIL,ZESTRIL) 10 mg, hydroCHLOROthiazide (HYDRODIURIL) 12.5 mg for ZESTORETIC 10-12.5, , Oral, Q24H, Sreekanth Sorto MD, Given at 08/10/21 0827  •  magnesium hydroxide (MILK OF MAGNESIA) suspension 2400 mg/10mL 10 mL, 10 mL, Oral, Daily PRN, Sreekanth Sorto MD  •  metFORMIN (GLUCOPHAGE) tablet 750 mg, 750 mg, Oral, BID With Meals, Sreekanth Sorto MD, 750 mg at 08/10/21 0826  •  Morphine sulfate (PF) injection 4 mg, 4 mg, Intravenous, Q4H PRN, 4 mg at 08/10/21 1044 **AND** naloxone (NARCAN) injection 0.4 mg, 0.4 mg, Intravenous, Q5 Min PRN, Sreekanth Sorto MD  •  naproxen (NAPROSYN) tablet 500 mg, 500 mg, Oral, BID PRN, Sreekanth Sorto MD  •  ondansetron (ZOFRAN) tablet 4 mg, 4 mg, Oral, Q6H PRN **OR** ondansetron (ZOFRAN) injection 4 mg, 4 mg, Intravenous, Q6H PRN, Sreekanth Sorto MD  •  oxyCODONE-acetaminophen (PERCOCET) 7.5-325 MG per tablet 1 tablet, 1 tablet, Oral, Q4H PRN, Sreekanth Sorto MD, 1 tablet at 08/10/21 1621  •  Pharmacy to dose vancomycin, , Does not apply, Continuous PRN, Yon Thakur, PharmD  •  sodium chloride 0.9 % flush 10 mL, 10 mL, Intravenous, PRN, Sreekanth Sorto MD  •  sodium chloride 0.9 % flush 3 mL, 3 mL, Intravenous, Q12H, Sreekanth Sorto MD  •  vancomycin 1500 mg/500 mL 0.9% NS IVPB ([facility]), 1,500 mg, Intravenous, Q12H, Yon Thakur, PharmD, Stopped at 08/10/21 1033    Antibiotics:  Anti-Infectives (From admission, onward)    Ordered     Dose/Rate Route Frequency Start Stop    08/09/21 1427  vancomycin 1500 mg/500 mL 0.9% NS IVPB ([facility])     Ordering Provider: Yon Thakur, PharmD    1,500 mg  over 120 Minutes Intravenous Every 12 Hours 08/09/21 2100 08/14/21 2059 08/09/21 1408  Pharmacy to dose vancomycin     Ordering Provider: Yon Thakur, PharmD     Does not apply Continuous PRN 08/09/21  1407 21 1406    21 0907  cefTRIAXone (ROCEPHIN) IVPB 2 g     Ordering Provider: Sreekanth Sorto MD    2 g  100 mL/hr over 30 Minutes Intravenous Every 24 Hours 21 1400 21 1359    21 0823  vancomycin 1750 mg/500 mL 0.9% NS IVPB ([facility])     Ordering Provider: Sreekanth Sorto MD    15 mg/kg × 119 kg  over 105 Minutes Intravenous Once 21 0825 21 0850            Review of Systems:  See HPI      Physical Exam:   Vital Signs  Temp (24hrs), Av.8 °F (36.6 °C), Min:97.6 °F (36.4 °C), Max:98.3 °F (36.8 °C)    Temp  Min: 97.6 °F (36.4 °C)  Max: 98.3 °F (36.8 °C)  BP  Min: 122/72  Max: 164/86  Pulse  Min: 65  Max: 80  Resp  Min: 16  Max: 20  SpO2  Min: 92 %  Max: 100 %    GENERAL: Awake and alert, in no acute distress.   HEENT: Normocephalic, atraumatic.  PERRL. EOMI. No conjunctival injection. No icterus. Oropharynx clear without evidence of thrush or exudate. No evidence of peridontal disease.      HEART: RRR; No murmur, rubs, gallops.   LUNGS: Clear to auscultation bilaterally  Normal respiratory effort. Nonlabored. No dullness.  ABDOMEN: Soft, nontender, nondistended. Positive bowel sounds.   EXT:  No edema  :  Without [place] catheter.  MSK: No joint deformity  SKIN: Warm and dry without cutaneous eruptions on Inspection/palpation.    NEURO: Oriented to PPT.  PSYCHIATRIC: Normal insight and judgement. Cooperative with PE    Laboratory Data    Results from last 7 days   Lab Units 21  0955   WBC 10*3/mm3 7.48   HEMOGLOBIN g/dL 12.6*   HEMATOCRIT % 36.7*   PLATELETS 10*3/mm3 334     Results from last 7 days   Lab Units 08/10/21  0620   SODIUM mmol/L 138   POTASSIUM mmol/L 3.8   CHLORIDE mmol/L 103   CO2 mmol/L 25.0   BUN mg/dL 13   CREATININE mg/dL 0.91   GLUCOSE mg/dL 119*   CALCIUM mg/dL 9.2     Results from last 7 days   Lab Units 21  1356   ALK PHOS U/L 102   BILIRUBIN mg/dL 0.3   ALT (SGPT) U/L 22   AST (SGOT) U/L 17     Results from last 7 days   Lab Units  08/04/21  0955   SED RATE mm/hr 42*     Results from last 7 days   Lab Units 08/04/21  0955   CRP mg/dL 2.33*                 Estimated Creatinine Clearance: 144.3 mL/min (by C-G formula based on SCr of 0.91 mg/dL).      Microbiology:  No results found for: ACANTHNAEG, AFBCX, BPERTUSSISCX, BLOODCX  No results found for: BCIDPCR, CXREFLEX, CSFCX, CULTURETIS  No results found for: CULTURES, HSVCX, URCX  No results found for: EYECULTURE, GCCX, HSVCULTURE, LABHSV  No results found for: LEGIONELLA, MRSACX, MUMPSCX, MYCOPLASCX  No results found for: NOCARDIACX, STOOLCX  No results found for: THROATCX, UNSTIMCULT, URINECX, CULTURE, VZVCULTUR  No results found for: VIRALCULTU, WOUNDCX        Radiology:  Imaging Results (Last 72 Hours)     ** No results found for the last 72 hours. **            Impression:   Lumbar wound infection  Status post L3-L4 discectomy  Back pain  PLAN/RECOMMENDATIONS:   Thank you for asking us to see Wenceslao Nava, I recommend the following:  Continue vancomycin per pharmacy dosing    Continue Rocephin 2 g IV daily    Follow-up operative cultures    Anticipate 2 to 6-week course of IV antibiotics upon discharge     Anticipate need for PICC line    Consider discharge home once cultures are final    I have discussed the case with patient's family    Abelardo Hansen MD  8/10/2021  16:57 EDT

## 2021-08-10 NOTE — CASE MANAGEMENT/SOCIAL WORK
Discharge Planning Assessment  [facility]     Patient Name: Wenceslao Nava  MRN: [id number]  Today's Date: 8/10/2021    Admit Date: 8/9/2021    Discharge Needs Assessment     Row Name 08/10/21 5262       Living Environment    Lives With  significant other    Name(s) of Who Lives With Patient  Ana Laura Bashir  [phone]    Current Living Arrangements  home/apartment/condo    Primary Care Provided by  self    Provides Primary Care For  no one    Family Caregiver if Needed  significant other    Family Caregiver Names  Ana Laura    Quality of Family Relationships  helpful;involved;supportive    Able to Return to Prior Arrangements  yes       Resource/Environmental Concerns    Resource/Environmental Concerns  none    Home Accessibility Concerns  stairs to enter home    Transportation Concerns  car, none       Transition Planning    Patient/Family Anticipates Transition to  home    Patient/Family Anticipated Services at Transition  home health care    Transportation Anticipated  family or friend will provide       Discharge Needs Assessment    Readmission Within the Last 30 Days  no previous admission in last 30 days    Equipment Currently Used at Home  none    Concerns to be Addressed  discharge planning;medication    Equipment Needed After Discharge  walker, rolling    Outpatient/Agency/Support Group Needs  homecare agency    Discharge Facility/Level of Care Needs  home with home health    Current Discharge Risk  physical impairment        Discharge Plan     Row Name 08/10/21 0225       Plan    Plan  Home    Patient/Family in Agreement with Plan  yes    Plan Comments  I met with Mr pickett and significant other at bedside to initiate d/c planning. His plan is to return home. Prior to admit he was independent with his own ADLs, he is on disability. we discussed need for long term IV antibiotics. He is agreeable. He states transportation to [place] for f/u will be very difficult to do weekly.but could arange to come  every 2 weks. Referred to [facility],await final orders for IV antibiotics. Discussed need for Home health to do weekly PICC care and lab work ( since he is 1 &1/2hrs away). Referred to [facility] [phone]. Await final orders from ID    Final Discharge Disposition Code  01 - home or self-care        Continued Care and Services - Admitted Since 8/9/2021    Coordination has not been started for this encounter.         Demographic Summary     Row Name 08/10/21 1540       General Information    Admission Type  observation    Arrived From  home    Referral Source  admission list    Reason for Consult  discharge planning    Preferred Language  English    General Information Comments  PCP- Passport       Contact Information    Permission Granted to Share Info With  ;family/designee        Functional Status     Row Name 08/10/21 1541       Functional Status    Usual Activity Tolerance  good    Current Activity Tolerance  -- see PT notes       Functional Status, IADL    Medications  independent    Meal Preparation  independent    Housekeeping  independent    Laundry  independent    Shopping  independent       Mental Status    General Appearance WDL  WDL       Mental Status Summary    Recent Changes in Mental Status/Cognitive Functioning  no changes       Employment/    Employment Status  disabled    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       Abuse/Neglect    No documentation.       Legal    No documentation.       Substance Abuse    No documentation.       Patient Forms    No documentation.           Sonja C Kellerman, HILL

## 2021-08-10 NOTE — PLAN OF CARE
Problem: Adult Inpatient Plan of Care  Goal: Plan of Care Review  Flowsheets (Taken 8/10/2021 6613)  Progress: improving  Plan of Care Reviewed With: patient  Outcome Summary: PT eval complete. Pt ambulated 180 feet using RW and CGA. Gait limited by pain. Bed mobility performed with SBA and STS with supervision. Reviewed HEP, spinal precautions, and logroll technique. Recommend pt d/c home with assist when appropriate.   Goal Outcome Evaluation:  Plan of Care Reviewed With: patient        Progress: improving  Outcome Summary: PT eval complete. Pt ambulated 180 feet using RW and CGA. Gait limited by pain. Bed mobility performed with SBA and STS with supervision. Reviewed HEP, spinal precautions, and logroll technique. Recommend pt d/c home with assist when appropriate.

## 2021-08-10 NOTE — PROGRESS NOTES
NEUROSURGERY PROGRESS NOTE     LOS: 0 days   Patient Care Team:  Flores Chua PA as PCP - General (Physician Assistant)  Flores Chua PA as Referring Physician (Physician Assistant)    Chief Complaint: Postop lumbar wound drainage.    POD#: 1 Day Post-Op  Procedures:  Lumbar wound I&D.    Interval History:   Patient Complaints: Mild low back and right leg pain.  Patient Denies: Weakness or numbness.    Vital Signs: Blood pressure 164/86, pulse 65, temperature 97.6 °F (36.4 °C), temperature source Oral, resp. rate 16, SpO2 96 %.  Intake/Output:     Intake/Output Summary (Last 24 hours) at 8/10/2021 0549  Last data filed at 8/10/2021 0200  Gross per 24 hour   Intake 2800 ml   Output 2000 ml   Net 800 ml       Physical Exam:  The patient is awake and alert.  He appears fairly comfortable.  There is mild serous staining on his dressing.     Data Review:    Intraoperative wound Gram stain demonstrates 3+ WBCs and a few gram-positive cocci.    Assessment/Plan:  1.  Postop lumbar wound drainage status post I&D.  2.  History of right L3-4 discectomy on 6/24/2021.  3.  Diabetes mellitus.  4.  Obesity.  5.  Disposition: Appreciate ID input.  Await final cultures.  Continue IV antibiotics.  PICC line/outpatient antibiotics and home when okay with ID.      Sreekanth Sorto MD  08/10/21  05:49 EDT

## 2021-08-11 LAB
GLUCOSE BLDC GLUCOMTR-MCNC: 82 MG/DL (ref 70–130)
GLUCOSE BLDC GLUCOMTR-MCNC: 86 MG/DL (ref 70–130)
GLUCOSE BLDC GLUCOMTR-MCNC: 91 MG/DL (ref 70–130)
GLUCOSE BLDC GLUCOMTR-MCNC: 93 MG/DL (ref 70–130)
VANCOMYCIN TROUGH SERPL-MCNC: 9.3 MCG/ML (ref 5–20)

## 2021-08-11 PROCEDURE — G0378 HOSPITAL OBSERVATION PER HR: HCPCS

## 2021-08-11 PROCEDURE — 25010000002 HEPARIN (PORCINE) PER 1000 UNITS: Performed by: NEUROLOGICAL SURGERY

## 2021-08-11 PROCEDURE — C1894 INTRO/SHEATH, NON-LASER: HCPCS

## 2021-08-11 PROCEDURE — 25010000002 VANCOMYCIN 10 G RECONSTITUTED SOLUTION

## 2021-08-11 PROCEDURE — C1751 CATH, INF, PER/CENT/MIDLINE: HCPCS

## 2021-08-11 PROCEDURE — 25010000003 CEFTRIAXONE PER 250 MG: Performed by: NEUROLOGICAL SURGERY

## 2021-08-11 PROCEDURE — 82962 GLUCOSE BLOOD TEST: CPT

## 2021-08-11 PROCEDURE — 99024 POSTOP FOLLOW-UP VISIT: CPT | Performed by: NEUROLOGICAL SURGERY

## 2021-08-11 PROCEDURE — 0 CEFTRIAXONE PER 250 MG: Performed by: NEUROLOGICAL SURGERY

## 2021-08-11 PROCEDURE — 25010000002 VANCOMYCIN PER 500 MG

## 2021-08-11 PROCEDURE — 97116 GAIT TRAINING THERAPY: CPT

## 2021-08-11 PROCEDURE — 80202 ASSAY OF VANCOMYCIN: CPT

## 2021-08-11 PROCEDURE — 25010000002 MORPHINE PER 10 MG: Performed by: NEUROLOGICAL SURGERY

## 2021-08-11 RX ORDER — SODIUM CHLORIDE 0.9 % (FLUSH) 0.9 %
10 SYRINGE (ML) INJECTION EVERY 12 HOURS SCHEDULED
Status: DISCONTINUED | OUTPATIENT
Start: 2021-08-11 | End: 2021-08-12 | Stop reason: HOSPADM

## 2021-08-11 RX ORDER — SODIUM CHLORIDE 0.9 % (FLUSH) 0.9 %
10 SYRINGE (ML) INJECTION AS NEEDED
Status: DISCONTINUED | OUTPATIENT
Start: 2021-08-11 | End: 2021-08-12 | Stop reason: HOSPADM

## 2021-08-11 RX ADMIN — OXYCODONE HYDROCHLORIDE AND ACETAMINOPHEN 1 TABLET: 7.5; 325 TABLET ORAL at 17:15

## 2021-08-11 RX ADMIN — GABAPENTIN 400 MG: 400 CAPSULE ORAL at 07:37

## 2021-08-11 RX ADMIN — SODIUM CHLORIDE, PRESERVATIVE FREE 10 ML: 5 INJECTION INTRAVENOUS at 20:29

## 2021-08-11 RX ADMIN — MORPHINE SULFATE 4 MG: 4 INJECTION, SOLUTION INTRAMUSCULAR; INTRAVENOUS at 08:35

## 2021-08-11 RX ADMIN — VANCOMYCIN HYDROCHLORIDE 1750 MG: 500 INJECTION, POWDER, LYOPHILIZED, FOR SOLUTION INTRAVENOUS at 21:21

## 2021-08-11 RX ADMIN — MORPHINE SULFATE 4 MG: 4 INJECTION, SOLUTION INTRAMUSCULAR; INTRAVENOUS at 23:33

## 2021-08-11 RX ADMIN — ATORVASTATIN CALCIUM 20 MG: 20 TABLET, FILM COATED ORAL at 07:36

## 2021-08-11 RX ADMIN — METFORMIN HYDROCHLORIDE 750 MG: 500 TABLET ORAL at 07:35

## 2021-08-11 RX ADMIN — FAMOTIDINE 20 MG: 20 TABLET ORAL at 20:29

## 2021-08-11 RX ADMIN — OXYCODONE HYDROCHLORIDE AND ACETAMINOPHEN 1 TABLET: 7.5; 325 TABLET ORAL at 12:18

## 2021-08-11 RX ADMIN — MORPHINE SULFATE 4 MG: 4 INJECTION, SOLUTION INTRAMUSCULAR; INTRAVENOUS at 14:07

## 2021-08-11 RX ADMIN — CEFTRIAXONE SODIUM 2 G: 2 INJECTION, SOLUTION INTRAVENOUS at 14:38

## 2021-08-11 RX ADMIN — HYDROCHLOROTHIAZIDE: 12.5 CAPSULE ORAL at 07:37

## 2021-08-11 RX ADMIN — GABAPENTIN 400 MG: 400 CAPSULE ORAL at 20:29

## 2021-08-11 RX ADMIN — FLUOXETINE HYDROCHLORIDE 40 MG: 20 CAPSULE ORAL at 07:37

## 2021-08-11 RX ADMIN — VANCOMYCIN HYDROCHLORIDE 1750 MG: 500 INJECTION, POWDER, LYOPHILIZED, FOR SOLUTION INTRAVENOUS at 10:36

## 2021-08-11 RX ADMIN — ACETAMINOPHEN 650 MG: 325 TABLET, FILM COATED ORAL at 10:36

## 2021-08-11 RX ADMIN — GABAPENTIN 400 MG: 400 CAPSULE ORAL at 14:48

## 2021-08-11 RX ADMIN — MORPHINE SULFATE 4 MG: 4 INJECTION, SOLUTION INTRAMUSCULAR; INTRAVENOUS at 04:25

## 2021-08-11 RX ADMIN — HEPARIN SODIUM 5000 UNITS: 5000 INJECTION INTRAVENOUS; SUBCUTANEOUS at 14:38

## 2021-08-11 RX ADMIN — OXYCODONE HYDROCHLORIDE AND ACETAMINOPHEN 1 TABLET: 7.5; 325 TABLET ORAL at 20:48

## 2021-08-11 RX ADMIN — SODIUM CHLORIDE, PRESERVATIVE FREE 3 ML: 5 INJECTION INTRAVENOUS at 21:23

## 2021-08-11 RX ADMIN — OXYCODONE HYDROCHLORIDE AND ACETAMINOPHEN 1 TABLET: 7.5; 325 TABLET ORAL at 07:35

## 2021-08-11 RX ADMIN — CYCLOBENZAPRINE 10 MG: 10 TABLET, FILM COATED ORAL at 07:34

## 2021-08-11 RX ADMIN — CYCLOBENZAPRINE 10 MG: 10 TABLET, FILM COATED ORAL at 20:29

## 2021-08-11 RX ADMIN — SODIUM CHLORIDE, POTASSIUM CHLORIDE, SODIUM LACTATE AND CALCIUM CHLORIDE 9 ML/HR: 600; 310; 30; 20 INJECTION, SOLUTION INTRAVENOUS at 14:38

## 2021-08-11 RX ADMIN — HEPARIN SODIUM 5000 UNITS: 5000 INJECTION INTRAVENOUS; SUBCUTANEOUS at 21:20

## 2021-08-11 RX ADMIN — FAMOTIDINE 20 MG: 20 TABLET ORAL at 07:37

## 2021-08-11 RX ADMIN — OXYCODONE HYDROCHLORIDE AND ACETAMINOPHEN 1 TABLET: 7.5; 325 TABLET ORAL at 02:22

## 2021-08-11 RX ADMIN — ASPIRIN 81 MG: 81 TABLET, COATED ORAL at 07:37

## 2021-08-11 RX ADMIN — HEPARIN SODIUM 5000 UNITS: 5000 INJECTION INTRAVENOUS; SUBCUTANEOUS at 06:04

## 2021-08-11 RX ADMIN — METFORMIN HYDROCHLORIDE 750 MG: 500 TABLET ORAL at 17:15

## 2021-08-11 NOTE — PROGRESS NOTES
Pharmacy Consult-Vancomycin Dosing  Wenceslao Nava is a  41 y.o. male receiving vancomycin therapy.     Indication: surg ppx, ssti  Consulting Provider: Macario NGUYỄN Consult: lori    Goal AUC: 400 - 600 mg/L*hr    Current Antimicrobial Therapy  Anti-Infectives (From admission, onward)      Ordered     Dose/Rate Route Frequency Start Stop    08/09/21 1427  vancomycin 1500 mg/500 mL 0.9% NS IVPB ([facility])     Ordering Provider: Yon Thakur PharmD    1,500 mg  over 120 Minutes Intravenous Every 12 Hours 08/09/21 2100 08/14/21 2059    08/09/21 1408  Pharmacy to dose vancomycin     Ordering Provider: Yon Thakur PharmD     Does not apply Continuous PRN 08/09/21 1407 08/14/21 1406    08/09/21 0907  cefTRIAXone (ROCEPHIN) IVPB 2 g     Ordering Provider: Sreekanth Sorto MD    2 g  100 mL/hr over 30 Minutes Intravenous Every 24 Hours 08/09/21 1400 08/14/21 1359    08/09/21 0823  vancomycin 1750 mg/500 mL 0.9% NS IVPB ([facility])     Ordering Provider: Sreekanth Sorto MD    15 mg/kg × 119 kg  over 105 Minutes Intravenous Once 08/09/21 0825 08/09/21 0850            Allergies  Allergies as of 08/06/2021    (No Known Allergies)       Labs    Results from last 7 days   Lab Units 08/10/21  0620 08/06/21  1356   BUN mg/dL 13 15   CREATININE mg/dL 0.91 1.17       Results from last 7 days   Lab Units 08/04/21  0955   WBC 10*3/mm3 7.48       Evaluation of Dosing     Last Dose Received in the ED/Outside Facility: n/a  Is Patient on Dialysis or Renal Replacement: n/a    Ht - 185.4 cm  Wt - 119 kg    Estimated Creatinine Clearance: 144.3 mL/min (by C-G formula based on SCr of 0.91 mg/dL).    Intake & Output (last 3 days)         08/08 0701 - 08/09 0700 08/09 0701 - 08/10 0700 08/10 0701 - 08/11 0700 08/11 0701 - 08/12 0700    P.O.  300 520 240    I.V.  1500 556     IV Piggyback  1000      Total Intake  2800 1076 240    Urine  2000      Total Output  2000      Net  +800 +1076 +240            Urine Unmeasured Occurrence  3 x 3  x     Stool Unmeasured Occurrence   3 x             Microbiology and Radiology  Microbiology Results (last 10 days)       Procedure Component Value - Date/Time    Wound Culture - Wound, Spine, Lumbar [[id number]] Collected: 08/09/21 0848    Lab Status: Preliminary result Specimen: Wound from Spine, Lumbar Updated: 08/10/21 0705     Wound Culture No growth     Gram Stain Moderate (3+) WBCs seen      Few (2+) Gram positive cocci    COVID PRE-OP / PRE-PROCEDURE SCREENING ORDER (NO ISOLATION) - Swab, Nasopharynx [[id number]]  (Normal) Collected: 08/06/21 1356    Lab Status: Final result Specimen: Swab from Nasopharynx Updated: 08/06/21 1805    Narrative:      The following orders were created for panel order COVID PRE-OP / PRE-PROCEDURE SCREENING ORDER (NO ISOLATION) - Swab, Nasopharynx.  Procedure                               Abnormality         Status                     ---------                               -----------         ------                     COVID-19, APTIMA PANTHER...[[id number]]  Normal              Final result                 Please view results for these tests on the individual orders.    COVID-19, APTIMA PANTHER FAM IN-HOUSE NP/OP SWAB IN UTM/VTM/SALINE TRANSPORT MEDIA 24HR TAT - Swab, Nasopharynx [[id number]]  (Normal) Collected: 08/06/21 1356    Lab Status: Final result Specimen: Swab from Nasopharynx Updated: 08/06/21 1805     COVID19 Not Detected    Narrative:      Fact sheet for providers: https://www.fda.gov/media/290853/download     Fact sheet for patients: https://www.fda.gov/media/809587/download    Test performed by RT PCR.            Reported Vancomycin Levels        Lab Results   Component Value Date    VANCOTROUGH 9.30 08/11/2021       Insight[facility] AUC Calculation:    Current AUC:  392 mg/L*hr    Predicted Steady State AUC on Current Dose: 412 mg/L*hr  _________________________________    Predicted Steady State AUC on New Dose: 480 mg/L*hr    Assessment/Plan:  Vanc 1750mg x 1 given prior to  surg. Then maintenance dose of Vanc 1500 mg q12h.  The AUC on the current dose was sub therapeutic at 392 mg/L*hr (Goal 400-600)  Will increase dose to 1750 mg q12h. With a predicted AUC of 480 mg/L*hr.  Will plan for a trough prior to the 4th dose.  Pharmacy will continue to follow.    Tamra Joshi, PharmD   08/11/21   09:15 EDT

## 2021-08-11 NOTE — PLAN OF CARE
Goal Outcome Evaluation:  Plan of Care Reviewed With: patient        Progress: improving  Outcome Summary: Pt. ambulated 550' w/rolling walker, stand by assist. Gait limited by fatigue. He tolerated progression in ther-ex well. Reviewed spinal precautions. Recommend home with assist upon discharge.

## 2021-08-11 NOTE — PROGRESS NOTES
NEUROSURGERY PROGRESS NOTE     LOS: 0 days   Patient Care Team:  Flores Chua PA as PCP - General (Physician Assistant)  Flores Chua PA as Referring Physician (Physician Assistant)    Chief Complaint: Postop lumbar wound drainage.    POD#: 2 Days Post-Op  Procedures:  Lumbar wound I&D.    Interval History:   The patient ambulated in the das yesterday.  Patient Complaints: Low back and bilateral hip pain, right greater than left.  Patient Denies: Leg pain.    Vital Signs: Blood pressure 155/99, pulse 61, temperature 97.7 °F (36.5 °C), temperature source Oral, resp. rate 18, SpO2 98 %.  Intake/Output:     Intake/Output Summary (Last 24 hours) at 8/11/2021 0653  Last data filed at 8/10/2021 1600  Gross per 24 hour   Intake 1076 ml   Output --   Net 1076 ml       Physical Exam:  Patient is awake and alert.  He is pleasant and cooperative.  Dry dressing is in place on his incision.     Data Review:    Wound culture positive for strep epidermidis in the office.  Intraoperative wound culture still pending.    Assessment/Plan:  1.  Postop lumbar wound drainage status post I&D.  2.  History of right L3-4 discectomy on 6/24/2021.  3.  Diabetes mellitus.  4.  Obesity.  5.  Disposition: Appreciate ID input.  Await final cultures.  Continue IV antibiotics.  PICC line/outpatient antibiotics and home when okay with ID.      Sreekanth Sorto MD  08/11/21  06:53 EDT

## 2021-08-11 NOTE — PROGRESS NOTES
INFECTIOUS DISEASE CONSULT/INITIAL HOSPITAL VISIT    Wenceslao Nava  1980  [id number]    Date of Consult: 8/11/2021    Admission Date: 8/9/2021      Requesting Provider: Sreekanth Sorto MD  Evaluating Physician: Abelardo Hansen MD    Reason for Consultation: lumbar back wound infection    History of present illness:    Patient is a 41 y.o. male with L3-L4 discectomy on 6/24/2021 complicated by wound drainage patient had cultures at neurosurgery office that grew Staph epidermidis patient given oral antibiotics Bactrim, doxycycline without substantial improvement because of elevated inflammatory markers, MRI which showed subcutaneous fluid collection patient brought to hospital for surgery, washout.  Patient has had surgery today and has been started on broad-spectrum antibiotics of vancomycin and ceftriaxone.    8/10/2021 still has some back pain no fevers rash sore throat is moving around better    8/11/21; no events overnight tolerating antibiotics denies fevers rash or sore throat still has back pain    Past Medical History:   Diagnosis Date   • Arthritis    • Diabetes mellitus (CMS/HCC)     po meds only    • Elevated cholesterol    • Hypertension    • Knee pain, left    • Wound infection after surgery 07/2021       Past Surgical History:   Procedure Laterality Date   • CHOLECYSTECTOMY     • LUMBAR DISCECTOMY Right 6/24/2021    Procedure: LAMINOTOMY, FORAMINOTOMY, DISCECTOMY, L3-4 RIGHT;  Surgeon: Sreekanth Sorto MD;  Location: [facility] OR;  Service: Neurosurgery;  Laterality: Right;   • LUMBAR DISCECTOMY N/A 8/9/2021    Procedure: INCISION AND DRAINAGE WOUND;  Surgeon: Sreekanth Sorto MD;  Location: [facility] OR;  Service: Neurosurgery;  Laterality: N/A;   • NECK SURGERY      May 2019: Believes it to be an ACDF, RAVIDNRA Jackman       Family History   Problem Relation Age of Onset   • Diabetes Brother    • Hypertension Brother    • Diabetes Mother        Social History     Socioeconomic  History   • Marital status: Single     Spouse name: Not on file   • Number of children: Not on file   • Years of education: Not on file   • Highest education level: Not on file   Tobacco Use   • Smoking status: Never Smoker   • Smokeless tobacco: Current User     Types: Snuff   Vaping Use   • Vaping Use: Never used   Substance and Sexual Activity   • Alcohol use: Not Currently     Comment: RARELY   • Drug use: No   • Sexual activity: Defer       No Known Allergies      Medication:    Current Facility-Administered Medications:   •  [START ON 8/12/2021] !Vancomycin trough 8/12 @ 2230. Please wait for evaluation by pharmacist before hanging dose., , Does not apply, Once, Tamra Joshi, PharmD  •  acetaminophen (TYLENOL) tablet 650 mg, 650 mg, Oral, Q4H PRN, Sreekanth Sorto MD, 650 mg at 08/11/21 1036  •  aspirin EC tablet 81 mg, 81 mg, Oral, Daily, Sreekanth Sorto MD, 81 mg at 08/11/21 0737  •  atorvastatin (LIPITOR) tablet 20 mg, 20 mg, Oral, Daily, Sreekanth Sorto MD, 20 mg at 08/11/21 0736  •  bisacodyl (DULCOLAX) suppository 10 mg, 10 mg, Rectal, Daily PRN, Sreekanth Sorto MD  •  cefTRIAXone (ROCEPHIN) IVPB 2 g, 2 g, Intravenous, Q24H, Sreekanth Sorto MD, Last Rate: 100 mL/hr at 08/10/21 1433, 2 g at 08/10/21 1433  •  cyclobenzaprine (FLEXERIL) tablet 10 mg, 10 mg, Oral, TID PRN, Sreekanth Sorto MD, 10 mg at 08/11/21 0734  •  diphenhydrAMINE (BENADRYL) capsule 25 mg, 25 mg, Oral, Nightly PRN, Sreekanth Sorto MD  •  docusate sodium (COLACE) capsule 100 mg, 100 mg, Oral, TID PRN, Sreekanth Sorto MD, 100 mg at 08/09/21 2141  •  famotidine (PEPCID) injection 20 mg, 20 mg, Intravenous, Q12H **OR** famotidine (PEPCID) tablet 20 mg, 20 mg, Oral, Q12H, Sreekanth Sorto MD, 20 mg at 08/11/21 0737  •  FLUoxetine (PROzac) capsule 40 mg, 40 mg, Oral, Daily, Sreekanth Sorto MD, 40 mg at 08/11/21 0737  •  gabapentin (NEURONTIN) capsule 400 mg, 400 mg, Oral, TID, Sreekanth Sorto MD, 400 mg at 08/11/21  0737  •  heparin (porcine) 5000 UNIT/ML injection 5,000 Units, 5,000 Units, Subcutaneous, Q8H, Sreekanth Sorto MD, 5,000 Units at 08/11/21 0604  •  lactated ringers infusion, 9 mL/hr, Intravenous, Continuous, Sreekanth Sorto MD, Stopped at 08/09/21 0846  •  lisinopril (PRINIVIL,ZESTRIL) 10 mg, hydroCHLOROthiazide (HYDRODIURIL) 12.5 mg for ZESTORETIC 10-12.5, , Oral, Q24H, Sreekanth Sorto MD, Given at 08/11/21 0737  •  magnesium hydroxide (MILK OF MAGNESIA) suspension 2400 mg/10mL 10 mL, 10 mL, Oral, Daily PRN, Sreekanth Sorto MD  •  metFORMIN (GLUCOPHAGE) tablet 750 mg, 750 mg, Oral, BID With Meals, Sreekanth Sorto MD, 750 mg at 08/11/21 0735  •  Morphine sulfate (PF) injection 4 mg, 4 mg, Intravenous, Q4H PRN, 4 mg at 08/11/21 0835 **AND** naloxone (NARCAN) injection 0.4 mg, 0.4 mg, Intravenous, Q5 Min PRN, Sreekanth Sorto MD  •  naproxen (NAPROSYN) tablet 500 mg, 500 mg, Oral, BID PRN, Sreekanth Sorto MD  •  ondansetron (ZOFRAN) tablet 4 mg, 4 mg, Oral, Q6H PRN **OR** ondansetron (ZOFRAN) injection 4 mg, 4 mg, Intravenous, Q6H PRN, Sreekanth Sorto MD  •  oxyCODONE-acetaminophen (PERCOCET) 7.5-325 MG per tablet 1 tablet, 1 tablet, Oral, Q4H PRN, Sreekanth Sorto MD, 1 tablet at 08/11/21 1218  •  Pharmacy to dose vancomycin, , Does not apply, Continuous PRN, Yon Thakur, PharmD  •  sodium chloride 0.9 % flush 10 mL, 10 mL, Intravenous, PRN, Sreekanth Sorto MD  •  sodium chloride 0.9 % flush 3 mL, 3 mL, Intravenous, Q12H, Sreekanth Sorto MD, 3 mL at 08/10/21 2057  •  vancomycin 1750 mg/500 mL 0.9% NS IVPB ([facility]), 1,750 mg, Intravenous, Q12H, Tamra Joshi, PharmD, 1,750 mg at 08/11/21 1036    Antibiotics:  Anti-Infectives (From admission, onward)    Ordered     Dose/Rate Route Frequency Start Stop    08/11/21 0927  !Vancomycin trough 8/12 @ 2230. Please wait for evaluation by pharmacist before hanging dose.     Ordering Provider: Tamra Joshi, PharmD     Does not apply Once  21 2200      21 0927  vancomycin 1750 mg/500 mL 0.9% NS IVPB ([facility])     Ordering Provider: Tamra Joshi PharmD    1,750 mg  over 120 Minutes Intravenous Every 12 Hours 21 1100 21 1059    21 1408  Pharmacy to dose vancomycin     Ordering Provider: Yon Thakur, Nickolas     Does not apply Continuous PRN 21 1407 21 1406    21 0907  cefTRIAXone (ROCEPHIN) IVPB 2 g     Ordering Provider: Sreekanth Sorto MD    2 g  100 mL/hr over 30 Minutes Intravenous Every 24 Hours 21 1400 21 1359    21 0823  vancomycin 1750 mg/500 mL 0.9% NS IVPB ([facility])     Ordering Provider: Sreekanth Sorto MD    15 mg/kg × 119 kg  over 105 Minutes Intravenous Once 21 0825 21 0850            Review of Systems:  See HPI      Physical Exam:   Vital Signs  Temp (24hrs), Av.5 °F (36.4 °C), Min:97.4 °F (36.3 °C), Max:97.7 °F (36.5 °C)    Temp  Min: 97.4 °F (36.3 °C)  Max: 97.7 °F (36.5 °C)  BP  Min: 130/84  Max: 155/99  Pulse  Min: 61  Max: 80  Resp  Min: 18  Max: 20  SpO2  Min: 92 %  Max: 98 %    GENERAL: Awake and alert, in no acute distress.   HEENT: Normocephalic, atraumatic.  PERRL. EOMI. No conjunctival injection. No icterus.  No external oral lesions    HEART: RRR; No murmur  LUNGS: Clear to auscultation bilaterally    ABDOMEN: Soft, nontender, nondistended.    EXT:  No edema  :  Without Petit catheter.  MSK: No joint deformity  SKIN: Warm and dry without cutaneous eruptions on Inspection/palpation.    NEURO: Oriented to PPT.  PSYCHIATRIC: Normal insight and judgement. Cooperative with PE    Laboratory Data        Results from last 7 days   Lab Units 08/10/21  0620   SODIUM mmol/L 138   POTASSIUM mmol/L 3.8   CHLORIDE mmol/L 103   CO2 mmol/L 25.0   BUN mg/dL 13   CREATININE mg/dL 0.91   GLUCOSE mg/dL 119*   CALCIUM mg/dL 9.2     Results from last 7 days   Lab Units 21  1356   ALK PHOS U/L 102   BILIRUBIN mg/dL 0.3   ALT (SGPT) U/L 22   AST (SGOT)  U/L 17                     Results from last 7 days   Lab Units 08/11/21  0748   VANCOMYCIN TR mcg/mL 9.30     Estimated Creatinine Clearance: 144.3 mL/min (by C-G formula based on SCr of 0.91 mg/dL).      Microbiology:  No results found for: ACANTHNAEG, AFBCX, BPERTUSSISCX, BLOODCX  No results found for: BCIDPCR, CXREFLEX, CSFCX, CULTURETIS  No results found for: CULTURES, HSVCX, URCX  No results found for: EYECULTURE, GCCX, HSVCULTURE, LABHSV  No results found for: LEGIONELLA, MRSACX, MUMPSCX, MYCOPLASCX  No results found for: NOCARDIACX, STOOLCX  No results found for: THROATCX, UNSTIMCULT, URINECX, CULTURE, VZVCULTUR  No results found for: VIRALCULTU, WOUNDCX        Radiology:  Imaging Results (Last 72 Hours)     ** No results found for the last 72 hours. **            Impression:   Lumbar wound infection  Status post L3-L4 discectomy  Back pain  PLAN/RECOMMENDATIONS:   Thank you for asking us to see Wenceslao Nava, I recommend the following:  Continue vancomycin per pharmacy dosing    Continue Rocephin 2 g IV daily    Follow-up operative cultures growth present awaiting final results    Anticipate 2 to 6-week course of IV antibiotics upon discharge     Order PICC line    Potential discharge as early as tomorrow    I have discussed case with Dr. Higinio Sorto    Discussed with family    Abelardo Hansen MD  8/11/2021  13:02 EDT

## 2021-08-11 NOTE — PLAN OF CARE
Goal Outcome Evaluation:               No new drainage noted on dressing. Pain still 5-10/10 all day today and requiring PRN morphine at times. PICC in place. Ambulating well and voiding.

## 2021-08-11 NOTE — THERAPY TREATMENT NOTE
Patient Name: Wenceslao Nava  : 1980    MRN: [id number]                              Today's Date: 2021       Admit Date: 2021    Visit Dx:     ICD-10-CM ICD-9-CM   1. Wound infection  T14.8XXA 958.3    L08.9      Patient Active Problem List   Diagnosis   • Hypertension   • Arthritis   • Chronic low back pain with bilateral sciatica   • DDD (degenerative disc disease), lumbar   • Spinal stenosis, lumbar region, with neurogenic claudication   • Morbid (severe) obesity due to excess calories (CMS/HCC)   • Physical deconditioning   • Cervical spondylosis without myelopathy   • HNP (herniated nucleus pulposus), lumbar   • Wound infection     Past Medical History:   Diagnosis Date   • Arthritis    • Diabetes mellitus (CMS/HCC)     po meds only    • Elevated cholesterol    • Hypertension    • Knee pain, left    • Wound infection after surgery 2021     Past Surgical History:   Procedure Laterality Date   • CHOLECYSTECTOMY     • LUMBAR DISCECTOMY Right 2021    Procedure: LAMINOTOMY, FORAMINOTOMY, DISCECTOMY, L3-4 RIGHT;  Surgeon: Sreekanth Sorto MD;  Location:  [facility] OR;  Service: Neurosurgery;  Laterality: Right;   • LUMBAR DISCECTOMY N/A 2021    Procedure: INCISION AND DRAINAGE WOUND;  Surgeon: Sreekanth Sorto MD;  Location: [facility] OR;  Service: Neurosurgery;  Laterality: N/A;   • NECK SURGERY      May 2019: Believes it to be an ACDF, RAVINDRA Jackman     General Information     Row Name 21 1128          Physical Therapy Time and Intention    Document Type  therapy note (daily note)  -SS     Mode of Treatment  physical therapy  -SS     Row Name 21 112          General Information    Patient Profile Reviewed  yes  -SS     Existing Precautions/Restrictions  spinal  -SS     Barriers to Rehab  none identified  -SS     Row Name 21 112          Cognition    Orientation Status (Cognition)  oriented x 4  -SS     Row Name 21 112          Safety Issues,  Functional Mobility    Safety Issues Affecting Function (Mobility)  awareness of need for assistance;insight into deficits/self-awareness;safety precaution awareness;safety precautions follow-through/compliance;sequencing abilities  -     Impairments Affecting Function (Mobility)  endurance/activity tolerance;strength;pain;range of motion (ROM);postural/trunk control  -       User Key  (r) = Recorded By, (t) = Taken By, (c) = Cosigned By    Initials Name Provider Type     Flores Charles PT Physical Therapist        Mobility     Row Name 08/11/21 1129          Bed Mobility    Bed Mobility  rolling right;sidelying-sit;rolling left;sit-sidelying  -     Rolling Left [place] (Bed Mobility)  standby assist;verbal cues  -     Rolling Right [place] (Bed Mobility)  verbal cues;standby assist  -     Sidelying-Sit [place] (Bed Mobility)  verbal cues;standby assist  -     Sit-Sidelying [place] (Bed Mobility)  standby assist;verbal cues  -     Assistive Device (Bed Mobility)  bed rails;head of bed elevated  -     Comment (Bed Mobility)  VC for log roll technique to maintain spinal precautions  -     Row Name 08/11/21 1129          Transfers    Comment (Transfers)  VC for hand placement, lowering with eccentric control  -     Row Name 08/11/21 1129          Sit-Stand Transfer    Sit-Stand [place] (Transfers)  verbal cues;standby assist  -     Assistive Device (Sit-Stand Transfers)  walker, front-wheeled  -[facility] Name 08/11/21 1129          Gait/Stairs (Locomotion)    [place] Level (Gait)  verbal cues;standby assist  -     Assistive Device (Gait)  walker, front-wheeled  -     Distance in Feet (Gait)  550'  -     Deviations/Abnormal Patterns (Gait)  bilateral deviations;[place] decreased;gait speed decreased  -     Bilateral Gait Deviations  forward flexed posture;heel strike decreased  -     [place] Level (Stairs)  not tested  -     Comment (Gait/Stairs)   Pt. ambulated with a step through gait pattern. VC for upright posture, increased heel strike and B foot clearance. Gait limited by fatigue.  -       User Key  (r) = Recorded By, (t) = Taken By, (c) = Cosigned By    Initials Name Provider Type     Flores Charles PT Physical Therapist        Obj/Interventions     Row Name 08/11/21 1131          Motor Skills    Therapeutic Exercise  hip;knee;ankle;other (see comments);shoulder ab sets x 15, bent knee fallouts x 15  -     Row Name 08/11/21 1131          Shoulder (Therapeutic Exercise)    Shoulder (Therapeutic Exercise)  AROM (active range of motion)  -     Shoulder AROM (Therapeutic Exercise)  bilateral;flexion;supine;10 repetitions;5 repetitions  -     Row Name 08/11/21 1131          Hip (Therapeutic Exercise)    Hip (Therapeutic Exercise)  isometric exercises;AROM (active range of motion)  -     Hip Isometrics (Therapeutic Exercise)  bilateral;gluteal sets;10 repetitions;5 repetitions  -     Row Name 08/11/21 1131          Knee (Therapeutic Exercise)    Knee (Therapeutic Exercise)  strengthening exercise;isometric exercises  -     Knee Isometrics (Therapeutic Exercise)  bilateral;quad sets;10 repetitions;5 repetitions  -     Knee Strengthening (Therapeutic Exercise)  bilateral;heel slides;10 repetitions;5 repetitions  -     Row Name 08/11/21 1131          Ankle (Therapeutic Exercise)    Ankle (Therapeutic Exercise)  AROM (active range of motion)  -     Ankle AROM (Therapeutic Exercise)  bilateral;dorsiflexion;plantarflexion;10 repetitions;5 repetitions  -     Row Name 08/11/21 1131          Balance    Balance Assessment  sitting static balance;sitting dynamic balance;standing static balance;standing dynamic balance  -     Static Sitting Balance  WFL;supported;unsupported  -     Dynamic Sitting Balance  WFL;supported;unsupported  -     Static Standing Balance  WFL;supported  -     Dynamic Standing Balance  WFL;supported  -      Balance Interventions  sitting;standing;sit to stand;supported;static;dynamic  -       User Key  (r) = Recorded By, (t) = Taken By, (c) = Cosigned By    Initials Name Provider Type    Flores Álvarez, [facility] Physical Therapist        Goals/Plan    No documentation.       Clinical Impression     Row Name 08/11/21 1133          Pain    Additional Documentation  Pain Scale: Numbers Pre/Post-Treatment (Group)  -SS     Row Name 08/11/21 1133          Pain Scale: Numbers Pre/Post-Treatment    Pretreatment Pain Rating  7/10  -SS     Posttreatment Pain Rating  7/10  -     Pain Location - Side  Bilateral  -     Pain Location - Orientation  generalized  -     Pain Location  back  -     Pain Intervention(s)  Ambulation/increased activity;Repositioned  -     Row Name 08/11/21 1133          Plan of Care Review    Plan of Care Reviewed With  patient  -     Progress  improving  -     Outcome Summary  Pt. ambulated 550' w/rolling walker, stand by assist. Gait limited by fatigue. He tolerated progression in ther-ex well. Reviewed spinal precautions. Recommend home with assist upon discharge.  -[facility] Name 08/11/21 1133          Therapy Assessment/Plan (PT)    Rehab Potential (PT)  good, to achieve stated therapy goals  -     Criteria for Skilled Interventions Met (PT)  yes;meets criteria;skilled treatment is necessary  -     Row Name 08/11/21 1133          Positioning and Restraints    Pre-Treatment Position  in bed  -     Post Treatment Position  bed  -SS     In Bed  notified nsg;supine;call light within reach;encouraged to call for assist;exit alarm on  -       User Key  (r) = Recorded By, (t) = Taken By, (c) = Cosigned By    Initials Name Provider Type    Flores Álvarez PT Physical Therapist        Outcome Measures     Row Name 08/11/21 1132          How much help from another person do you currently need...    Turning from your back to your side while in flat bed without using bedrails?  3  -      Moving from lying on back to sitting on the side of a flat bed without bedrails?  3  -SS     Moving to and from a bed to a chair (including a wheelchair)?  3  -SS     Standing up from a chair using your arms (e.g., wheelchair, bedside chair)?  3  -SS     Climbing 3-5 steps with a railing?  3  -SS     To walk in hospital room?  3  -SS     AM-PAC 6 Clicks Score (PT)  18  -SS     Row Name 08/11/21 1135          Functional Assessment    Outcome Measure Options  AM-PAC 6 Clicks Basic Mobility (PT)  -       User Key  (r) = Recorded By, (t) = Taken By, (c) = Cosigned By    Initials Name Provider Type    SS Flores Charles, [place] Physical Therapist                       Physical Therapy Education                 Title: PT OT SLP Therapies (Done)     Topic: Physical Therapy (Done)     Point: Mobility training (Done)     Learning Progress Summary           Patient Acceptance, E, VU,NR by  at 8/11/2021 1138    Comment: Reviewed safety/technique w/bed mobility (log roll), transfers, ambulation, spinal precautions, home exercise program    Acceptance, E,D, VU by RODGER at 8/10/2021 0940    Comment: Educated on safe sequencing with bed mobility, ambulatory transfers, and gait. Reviewed HEP and spinal precautions.                   Point: Home exercise program (Done)     Learning Progress Summary           Patient Acceptance, E, VU,NR by  at 8/11/2021 1138    Comment: Reviewed safety/technique w/bed mobility (log roll), transfers, ambulation, spinal precautions, home exercise program    Acceptance, E,D, VU by RODGER at 8/10/2021 0940    Comment: Educated on safe sequencing with bed mobility, ambulatory transfers, and gait. Reviewed HEP and spinal precautions.                   Point: Body mechanics (Done)     Learning Progress Summary           Patient Acceptance, E, VU,NR by  at 8/11/2021 1138    Comment: Reviewed safety/technique w/bed mobility (log roll), transfers, ambulation, spinal precautions, home exercise program     Acceptance, E,D, VU by  at 8/10/2021 0940    Comment: Educated on safe sequencing with bed mobility, ambulatory transfers, and gait. Reviewed HEP and spinal precautions.                   Point: Precautions (Done)     Learning Progress Summary           Patient Acceptance, E, VU,NR by  at 8/11/2021 1138    Comment: Reviewed safety/technique w/bed mobility (log roll), transfers, ambulation, spinal precautions, home exercise program    Acceptance, E,D, VU by  at 8/10/2021 0940    Comment: Educated on safe sequencing with bed mobility, ambulatory transfers, and gait. Reviewed HEP and spinal precautions.                               User Key     Initials Effective Dates Name Provider Type Discipline     06/16/21 -  Sandeep Rollins, PT Physical Therapist PT     06/01/21 -  Flores Charles PT Physical Therapist PT              PT Recommendation and Plan     Plan of Care Reviewed With: patient  Progress: improving  Outcome Summary: Pt. ambulated 550' w/rolling walker, stand by assist. Gait limited by fatigue. He tolerated progression in ther-ex well. Reviewed spinal precautions. Recommend home with assist upon discharge.     Time Calculation:   PT Charges     Row Name 08/11/21 1143             Time Calculation    Start Time  1108  -      Stop Time  1125  -SS      Time Calculation (min)  17 min  -SS      PT Received On  08/11/21  -         Time Calculation- PT    Total Timed Code Minutes- PT  17 minute(s)  -SS         Timed Charges    94541 - PT Therapeutic Exercise Minutes  4  -SS      67446 - Gait Training Minutes   10  -SS      30434 - PT Therapeutic Activity Minutes  3  -SS         Total Minutes    Timed Charges Total Minutes  17  -SS       Total Minutes  17  -SS        User Key  (r) = Recorded By, (t) = Taken By, (c) = Cosigned By    Initials Name Provider Type     Flores Charles, PT Physical Therapist        Therapy Charges for Today     Code Description Service Date Service Provider Modifiers Qty     [id number]  GAIT TRAINING EA 15 MIN 8/11/2021 Flores Charles, PT GP 1          PT G-Codes  Outcome Measure Options: AM-PAC 6 Clicks Basic Mobility (PT)  AM-PAC 6 Clicks Score (PT): 18    Flores Charles, PT  8/11/2021

## 2021-08-11 NOTE — PLAN OF CARE
Problem: Adult Inpatient Plan of Care  Goal: Plan of Care Review  Outcome: Ongoing, Progressing  Flowsheets  Taken 8/11/2021 0400 by Raymundo Coleman, RN  Outcome Summary: Pt rested well during night, some pain reported but PO medications did help. Ambulated around unit well with minimal increase in pain. Vital signs stable and wdl. Makes needs known to staff.  Taken 8/10/2021 0940 by Sandeep Rollins, PT  Progress: improving  Plan of Care Reviewed With: patient     Problem: Fall Injury Risk  Goal: Absence of Fall and Fall-Related Injury  Outcome: Ongoing, Progressing  Intervention: Identify and Manage Contributors to Fall Injury Risk  Recent Flowsheet Documentation  Taken 8/10/2021 2000 by Raymundo Coleman, RN  Medication Review/Management:   medications reviewed   high-risk medications identified  Intervention: Promote Injury-Free Environment  Recent Flowsheet Documentation  Taken 8/11/2021 0200 by Raymundo Coleman, RN  Safety Promotion/Fall Prevention:   activity supervised   assistive device/personal items within reach   clutter free environment maintained   fall prevention program maintained   gait belt   lighting adjusted   mobility aid in reach   nonskid shoes/slippers when out of bed   room organization consistent   safety round/check completed  Taken 8/11/2021 0000 by Raymundo Coleman, RN  Safety Promotion/Fall Prevention:   activity supervised   assistive device/personal items within reach   clutter free environment maintained   fall prevention program maintained   gait belt   lighting adjusted   mobility aid in reach   nonskid shoes/slippers when out of bed   room organization consistent   safety round/check completed  Taken 8/10/2021 2200 by Raymundo Coleman, RN  Safety Promotion/Fall Prevention:   activity supervised   assistive device/personal items within reach   clutter free environment maintained   fall prevention program maintained   lighting adjusted   nonskid shoes/slippers when out of bed   room organization  consistent   safety round/check completed  Taken 8/10/2021 2000 by Raymundo Coleman, RN  Safety Promotion/Fall Prevention:   activity supervised   assistive device/personal items within reach   clutter free environment maintained   fall prevention program maintained   gait belt   lighting adjusted   mobility aid in reach   nonskid shoes/slippers when out of bed   room organization consistent   safety round/check completed     Problem: Bleeding (Spinal Surgery)  Goal: Absence of Bleeding  Outcome: Ongoing, Progressing     Problem: Bowel Elimination Impaired (Spinal Surgery)  Goal: Effective Bowel Elimination  Outcome: Ongoing, Progressing     Problem: Functional Ability Impaired (Spinal Surgery)  Goal: Optimal Functional Ability  Outcome: Ongoing, Progressing  Intervention: Optimize Functional Status  Recent Flowsheet Documentation  Taken 8/11/2021 0200 by Raymundo Coleman RN  Positioning/Transfer Devices:   pillows   in use  Taken 8/11/2021 0000 by Raymundo Coleman, RN  Activity Management: activity adjusted per tolerance  Positioning/Transfer Devices:   pillows   in use  Taken 8/10/2021 2200 by Raymundo Coleman, RN  Activity Management:   activity adjusted per tolerance   up ad ranulfo  Positioning/Transfer Devices:   pillows   in use  Taken 8/10/2021 2000 by Raymundo Coleman RN  Activity Management:   activity adjusted per tolerance   up ad ranulfo  Positioning/Transfer Devices:   pillows   in use     Problem: Infection (Spinal Surgery)  Goal: Absence of Infection Signs and Symptoms  Outcome: Ongoing, Progressing     Problem: Neurologic Impairment (Spinal Surgery)  Goal: Optimal Neurologic Function  Outcome: Ongoing, Progressing  Intervention: Optimize Neurologic Function  Recent Flowsheet Documentation  Taken 8/11/2021 0200 by Raymundo Coleman, RN  Body Position: position changed independently  Taken 8/11/2021 0000 by Raymundo Coleman RN  Body Position: position changed independently  Taken 8/10/2021 2200 by Raymundo Coleman  RN  Body Position: position changed independently  Taken 8/10/2021 2000 by Raymundo Coleman RN  Body Position: position changed independently     Problem: Ongoing Anesthesia Effects (Spinal Surgery)  Goal: Anesthesia/Sedation Recovery  Outcome: Ongoing, Progressing  Intervention: Optimize Anesthesia Recovery  Recent Flowsheet Documentation  Taken 8/11/2021 0200 by Raymundo Coleman RN  Safety Promotion/Fall Prevention:   activity supervised   assistive device/personal items within reach   clutter free environment maintained   fall prevention program maintained   gait belt   lighting adjusted   mobility aid in reach   nonskid shoes/slippers when out of bed   room organization consistent   safety round/check completed  Taken 8/11/2021 0000 by Raymundo Coleman RN  Safety Promotion/Fall Prevention:   activity supervised   assistive device/personal items within reach   clutter free environment maintained   fall prevention program maintained   gait belt   lighting adjusted   mobility aid in reach   nonskid shoes/slippers when out of bed   room organization consistent   safety round/check completed  Taken 8/10/2021 2200 by Raymundo Coleman RN  Safety Promotion/Fall Prevention:   activity supervised   assistive device/personal items within reach   clutter free environment maintained   fall prevention program maintained   lighting adjusted   nonskid shoes/slippers when out of bed   room organization consistent   safety round/check completed  Taken 8/10/2021 2000 by Raymundo Coleman RN  Safety Promotion/Fall Prevention:   activity supervised   assistive device/personal items within reach   clutter free environment maintained   fall prevention program maintained   gait belt   lighting adjusted   mobility aid in reach   nonskid shoes/slippers when out of bed   room organization consistent   safety round/check completed     Problem: Pain (Spinal Surgery)  Goal: Acceptable Pain Control  Outcome: Ongoing, Progressing  Intervention:  Prevent or Manage Pain  Recent Flowsheet Documentation  Taken 8/11/2021 0200 by Raymundo Coleman RN  Pain Management Interventions:   pain management plan reviewed with patient/caregiver   pillow support provided   position adjusted   see MAR  Taken 8/11/2021 0000 by Raymundo Coleman RN  Pain Management Interventions:   pain management plan reviewed with patient/caregiver   pillow support provided   position adjusted  Taken 8/10/2021 2200 by Raymundo Coleman RN  Pain Management Interventions:   pain management plan reviewed with patient/caregiver   pillow support provided   position adjusted  Taken 8/10/2021 2000 by Raymundo Coleman RN  Pain Management Interventions:   cold applied   pain management plan reviewed with patient/caregiver   pillow support provided   position adjusted     Problem: Postoperative Nausea and Vomiting (Spinal Surgery)  Goal: Nausea and Vomiting Relief  Outcome: Ongoing, Progressing     Problem: Postoperative Urinary Retention (Spinal Surgery)  Goal: Effective Urinary Elimination  Outcome: Ongoing, Progressing  Intervention: Monitor and Manage Urinary Retention  Recent Flowsheet Documentation  Taken 8/10/2021 2000 by Raymundo Coleman RN  Urinary Elimination Promotion:   toileting offered   toileting scheduled   Goal Outcome Evaluation:              Outcome Summary: Pt rested well during night, some pain reported but PO medications did help. Ambulated around unit well with minimal increase in pain. Vital signs stable and wdl. Makes needs known to staff.

## 2021-08-12 VITALS
TEMPERATURE: 97.8 F | SYSTOLIC BLOOD PRESSURE: 152 MMHG | RESPIRATION RATE: 18 BRPM | DIASTOLIC BLOOD PRESSURE: 91 MMHG | OXYGEN SATURATION: 97 % | HEART RATE: 75 BPM

## 2021-08-12 LAB
BACTERIA SPEC AEROBE CULT: NORMAL
GLUCOSE BLDC GLUCOMTR-MCNC: 92 MG/DL (ref 70–130)
GLUCOSE BLDC GLUCOMTR-MCNC: 96 MG/DL (ref 70–130)
GRAM STN SPEC: NORMAL
GRAM STN SPEC: NORMAL

## 2021-08-12 PROCEDURE — 25010000002 DAPTOMYCIN PER 1 MG: Performed by: INTERNAL MEDICINE

## 2021-08-12 PROCEDURE — 25010000002 HEPARIN (PORCINE) PER 1000 UNITS: Performed by: NEUROLOGICAL SURGERY

## 2021-08-12 PROCEDURE — 82962 GLUCOSE BLOOD TEST: CPT

## 2021-08-12 PROCEDURE — 25010000002 VANCOMYCIN PER 500 MG

## 2021-08-12 PROCEDURE — 0 CEFTRIAXONE PER 250 MG: Performed by: NEUROLOGICAL SURGERY

## 2021-08-12 PROCEDURE — 25010000002 MORPHINE PER 10 MG: Performed by: NEUROLOGICAL SURGERY

## 2021-08-12 PROCEDURE — 25010000003 CEFTRIAXONE PER 250 MG: Performed by: NEUROLOGICAL SURGERY

## 2021-08-12 PROCEDURE — 25010000002 VANCOMYCIN 10 G RECONSTITUTED SOLUTION

## 2021-08-12 PROCEDURE — 99024 POSTOP FOLLOW-UP VISIT: CPT | Performed by: NEUROLOGICAL SURGERY

## 2021-08-12 PROCEDURE — G0378 HOSPITAL OBSERVATION PER HR: HCPCS

## 2021-08-12 RX ORDER — OXYCODONE HYDROCHLORIDE 15 MG/1
15 TABLET, FILM COATED, EXTENDED RELEASE ORAL EVERY 12 HOURS SCHEDULED
Qty: 14 TABLET | Refills: 0 | Status: SHIPPED | OUTPATIENT
Start: 2021-08-12 | End: 2021-08-25

## 2021-08-12 RX ORDER — OXYCODONE HYDROCHLORIDE 15 MG/1
15 TABLET, FILM COATED, EXTENDED RELEASE ORAL EVERY 8 HOURS SCHEDULED
Status: DISCONTINUED | OUTPATIENT
Start: 2021-08-12 | End: 2021-08-12 | Stop reason: HOSPADM

## 2021-08-12 RX ORDER — CEFTRIAXONE SODIUM 2 G/50ML
2 INJECTION, SOLUTION INTRAVENOUS EVERY 24 HOURS
Qty: 50 ML | Refills: 0 | Status: SHIPPED | OUTPATIENT
Start: 2021-08-13 | End: 2021-08-14

## 2021-08-12 RX ORDER — OXYCODONE AND ACETAMINOPHEN 7.5; 325 MG/1; MG/1
1 TABLET ORAL EVERY 4 HOURS PRN
Qty: 40 TABLET | Refills: 0 | Status: SHIPPED | OUTPATIENT
Start: 2021-08-12 | End: 2021-08-18

## 2021-08-12 RX ADMIN — ATORVASTATIN CALCIUM 20 MG: 20 TABLET, FILM COATED ORAL at 08:00

## 2021-08-12 RX ADMIN — HEPARIN SODIUM 5000 UNITS: 5000 INJECTION INTRAVENOUS; SUBCUTANEOUS at 05:09

## 2021-08-12 RX ADMIN — FLUOXETINE HYDROCHLORIDE 40 MG: 20 CAPSULE ORAL at 07:59

## 2021-08-12 RX ADMIN — OXYCODONE HYDROCHLORIDE 15 MG: 15 TABLET, FILM COATED, EXTENDED RELEASE ORAL at 14:46

## 2021-08-12 RX ADMIN — OXYCODONE HYDROCHLORIDE AND ACETAMINOPHEN 1 TABLET: 7.5; 325 TABLET ORAL at 15:32

## 2021-08-12 RX ADMIN — DAPTOMYCIN 550 MG: 500 INJECTION, POWDER, LYOPHILIZED, FOR SOLUTION INTRAVENOUS at 15:33

## 2021-08-12 RX ADMIN — CEFTRIAXONE SODIUM 2 G: 2 INJECTION, SOLUTION INTRAVENOUS at 14:46

## 2021-08-12 RX ADMIN — CYCLOBENZAPRINE 10 MG: 10 TABLET, FILM COATED ORAL at 09:04

## 2021-08-12 RX ADMIN — OXYCODONE HYDROCHLORIDE AND ACETAMINOPHEN 1 TABLET: 7.5; 325 TABLET ORAL at 03:35

## 2021-08-12 RX ADMIN — HEPARIN SODIUM 5000 UNITS: 5000 INJECTION INTRAVENOUS; SUBCUTANEOUS at 14:47

## 2021-08-12 RX ADMIN — GABAPENTIN 400 MG: 400 CAPSULE ORAL at 08:00

## 2021-08-12 RX ADMIN — OXYCODONE HYDROCHLORIDE AND ACETAMINOPHEN 1 TABLET: 7.5; 325 TABLET ORAL at 11:27

## 2021-08-12 RX ADMIN — METFORMIN HYDROCHLORIDE 750 MG: 500 TABLET ORAL at 07:58

## 2021-08-12 RX ADMIN — CYCLOBENZAPRINE 10 MG: 10 TABLET, FILM COATED ORAL at 17:02

## 2021-08-12 RX ADMIN — MORPHINE SULFATE 4 MG: 4 INJECTION, SOLUTION INTRAMUSCULAR; INTRAVENOUS at 08:59

## 2021-08-12 RX ADMIN — ASPIRIN 81 MG: 81 TABLET, COATED ORAL at 07:59

## 2021-08-12 RX ADMIN — GABAPENTIN 400 MG: 400 CAPSULE ORAL at 14:50

## 2021-08-12 RX ADMIN — OXYCODONE HYDROCHLORIDE AND ACETAMINOPHEN 1 TABLET: 7.5; 325 TABLET ORAL at 07:56

## 2021-08-12 RX ADMIN — FAMOTIDINE 20 MG: 20 TABLET ORAL at 08:00

## 2021-08-12 RX ADMIN — HYDROCHLOROTHIAZIDE: 12.5 CAPSULE ORAL at 07:59

## 2021-08-12 RX ADMIN — SODIUM CHLORIDE, PRESERVATIVE FREE 10 ML: 5 INJECTION INTRAVENOUS at 08:00

## 2021-08-12 RX ADMIN — VANCOMYCIN HYDROCHLORIDE 1750 MG: 500 INJECTION, POWDER, LYOPHILIZED, FOR SOLUTION INTRAVENOUS at 11:27

## 2021-08-12 NOTE — PLAN OF CARE
Goal Outcome Evaluation:         Patient pain improved to 5-6/10 maintained with PO pain medications. Up ad ranulfo. Dsg changed today. Eager to discharge.

## 2021-08-12 NOTE — PROGRESS NOTES
INFECTIOUS DISEASE CONSULT/INITIAL HOSPITAL VISIT    Wenceslao Nava  1980  [id number]    Date of Consult: 8/12/2021    Admission Date: 8/9/2021      Requesting Provider: Sreekanth Sorto MD  Evaluating Physician: Abelardo Hansen MD    Reason for Consultation: lumbar back wound infection    History of present illness:    Patient is a 41 y.o. male with L3-L4 discectomy on 6/24/2021 complicated by wound drainage patient had cultures at neurosurgery office that grew Staph epidermidis patient given oral antibiotics Bactrim, doxycycline without substantial improvement because of elevated inflammatory markers, MRI which showed subcutaneous fluid collection patient brought to hospital for surgery, washout.  Patient has had surgery today and has been started on broad-spectrum antibiotics of vancomycin and ceftriaxone.    8/10/2021 still has some back pain no fevers rash sore throat is moving around better    8/11/21; no events overnight tolerating antibiotics denies fevers rash or sore throat still has back pain    8/12/2021; doing well no events overnight wants to go home PICC line placed no fever rash or sore throat    Past Medical History:   Diagnosis Date   • Arthritis    • Diabetes mellitus (CMS/HCC)     po meds only    • Elevated cholesterol    • Hypertension    • Knee pain, left    • Wound infection after surgery 07/2021       Past Surgical History:   Procedure Laterality Date   • CHOLECYSTECTOMY     • LUMBAR DISCECTOMY Right 6/24/2021    Procedure: LAMINOTOMY, FORAMINOTOMY, DISCECTOMY, L3-4 RIGHT;  Surgeon: Sreekanth Sorto MD;  Location: [facility] OR;  Service: Neurosurgery;  Laterality: Right;   • LUMBAR DISCECTOMY N/A 8/9/2021    Procedure: INCISION AND DRAINAGE WOUND;  Surgeon: Sreekanth Sorto MD;  Location: [facility] OR;  Service: Neurosurgery;  Laterality: N/A;   • NECK SURGERY      May 2019: Believes it to be an ACDF, RAVINDRA Jackman       Family History   Problem Relation Age of Onset   •  Diabetes Brother    • Hypertension Brother    • Diabetes Mother        Social History     Socioeconomic History   • Marital status: Single     Spouse name: Not on file   • Number of children: Not on file   • Years of education: Not on file   • Highest education level: Not on file   Tobacco Use   • Smoking status: Never Smoker   • Smokeless tobacco: Current User     Types: Snuff   Vaping Use   • Vaping Use: Never used   Substance and Sexual Activity   • Alcohol use: Not Currently     Comment: RARELY   • Drug use: No   • Sexual activity: Defer       No Known Allergies      Medication:    Current Facility-Administered Medications:   •  acetaminophen (TYLENOL) tablet 650 mg, 650 mg, Oral, Q4H PRN, Sreekanth Sorto MD, 650 mg at 08/11/21 1036  •  aspirin EC tablet 81 mg, 81 mg, Oral, Daily, Sreekanth Sorto MD, 81 mg at 08/12/21 0759  •  atorvastatin (LIPITOR) tablet 20 mg, 20 mg, Oral, Daily, Sreekanth Sorto MD, 20 mg at 08/12/21 0800  •  bisacodyl (DULCOLAX) suppository 10 mg, 10 mg, Rectal, Daily PRN, Sreekatnh Sorto MD  •  cefTRIAXone (ROCEPHIN) IVPB 2 g, 2 g, Intravenous, Q24H, Sreekanth Sorto MD, Last Rate: 100 mL/hr at 08/12/21 1446, 2 g at 08/12/21 1446  •  cyclobenzaprine (FLEXERIL) tablet 10 mg, 10 mg, Oral, TID PRN, Sreekanth Sorto MD, 10 mg at 08/12/21 0904  •  DAPTOmycin (CUBICIN) 550 mg in sodium chloride 0.9 % 50 mL IVPB, 6 mg/kg (Adjusted), Intravenous, Q24H, Abelardo Hansen MD  •  diphenhydrAMINE (BENADRYL) capsule 25 mg, 25 mg, Oral, Nightly PRN, Sreekanth Sorto MD  •  docusate sodium (COLACE) capsule 100 mg, 100 mg, Oral, TID PRN, Sreekanth Sorto MD, 100 mg at 08/09/21 2141  •  famotidine (PEPCID) injection 20 mg, 20 mg, Intravenous, Q12H **OR** famotidine (PEPCID) tablet 20 mg, 20 mg, Oral, Q12H, Sreekanth Sorto MD, 20 mg at 08/12/21 0800  •  FLUoxetine (PROzac) capsule 40 mg, 40 mg, Oral, Daily, Sreekanth Sorto MD, 40 mg at 08/12/21 0759  •  gabapentin (NEURONTIN) capsule 400  mg, 400 mg, Oral, TID, Sreekanth Sorto MD, 400 mg at 08/12/21 1450  •  heparin (porcine) 5000 UNIT/ML injection 5,000 Units, 5,000 Units, Subcutaneous, Q8H, Sreekanth Sorto MD, 5,000 Units at 08/12/21 1447  •  lactated ringers infusion, 9 mL/hr, Intravenous, Continuous, Sreekanth Sorto MD, Last Rate: 9 mL/hr at 08/11/21 1438, 9 mL/hr at 08/11/21 1438  •  lisinopril (PRINIVIL,ZESTRIL) 10 mg, hydroCHLOROthiazide (HYDRODIURIL) 12.5 mg for ZESTORETIC 10-12.5, , Oral, Q24H, Sreekanth Sorto MD, Given at 08/12/21 0759  •  magnesium hydroxide (MILK OF MAGNESIA) suspension 2400 mg/10mL 10 mL, 10 mL, Oral, Daily PRN, Sreekanth Sorto MD  •  metFORMIN (GLUCOPHAGE) tablet 750 mg, 750 mg, Oral, BID With Meals, Sreekanth Sorto MD, 750 mg at 08/12/21 0758  •  Morphine sulfate (PF) injection 4 mg, 4 mg, Intravenous, Q4H PRN, 4 mg at 08/12/21 0859 **AND** naloxone (NARCAN) injection 0.4 mg, 0.4 mg, Intravenous, Q5 Min PRN, Sreekanth Sorto MD  •  naproxen (NAPROSYN) tablet 500 mg, 500 mg, Oral, BID PRN, Sreekanth Sorto MD  •  ondansetron (ZOFRAN) tablet 4 mg, 4 mg, Oral, Q6H PRN **OR** ondansetron (ZOFRAN) injection 4 mg, 4 mg, Intravenous, Q6H PRN, Sreekanth Sorto MD  •  oxyCODONE ER (oxyCONTIN) 12 hr tablet 15 mg, 15 mg, Oral, Q8H, Sreekanth Sorto MD, 15 mg at 08/12/21 1446  •  oxyCODONE-acetaminophen (PERCOCET) 7.5-325 MG per tablet 1 tablet, 1 tablet, Oral, Q4H PRN, Sreekanth Sorto MD, 1 tablet at 08/12/21 1127  •  sodium chloride 0.9 % flush 10 mL, 10 mL, Intravenous, PRN, Sreekanth Sorto MD  •  sodium chloride 0.9 % flush 10 mL, 10 mL, Intravenous, Q12H, Abelardo Hansen MD, 10 mL at 08/11/21 2029  •  sodium chloride 0.9 % flush 10 mL, 10 mL, Intravenous, PRN, Abelardo Hansen MD, 10 mL at 08/12/21 0800  •  sodium chloride 0.9 % flush 3 mL, 3 mL, Intravenous, Q12H, Sreekanth Sorto MD, 3 mL at 08/11/21 2123    Antibiotics:  Anti-Infectives (From admission, onward)    Ordered     Dose/Rate Route  Frequency Start Stop    21 1352  DAPTOmycin (CUBICIN) 550 mg in sodium chloride 0.9 % 50 mL IVPB     Ordering Provider: Abelardo Hansen MD    6 mg/kg × 95.5 kg (Adjusted)  100 mL/hr over 30 Minutes Intravenous Every 24 Hours 21 1500 21 1459    21 0907  cefTRIAXone (ROCEPHIN) IVPB 2 g     Ordering Provider: Sreekanth Sorto MD    2 g  100 mL/hr over 30 Minutes Intravenous Every 24 Hours 21 1400 21 1359    21 0823  vancomycin 1750 mg/500 mL 0.9% NS IVPB ([facility])     Ordering Provider: Sreekanth Sorto MD    15 mg/kg × 119 kg  over 105 Minutes Intravenous Once 21 0825 21 0850            Review of Systems:  See HPI      Physical Exam:   Vital Signs  Temp (24hrs), Av.6 °F (36.4 °C), Min:97.5 °F (36.4 °C), Max:97.8 °F (36.6 °C)    Temp  Min: 97.5 °F (36.4 °C)  Max: 97.8 °F (36.6 °C)  BP  Min: 132/93  Max: 166/102  Pulse  Min: 68  Max: 81  Resp  Min: 18  Max: 20  SpO2  Min: 97 %  Max: 97 %    GENERAL: Awake and alert, in no acute distress.   HEENT: Normocephalic, atraumatic.  PERRL. EOMI. No conjunctival injection. No icterus.  No external oral lesions    HEART: RRR; No murmur  LUNGS: Clear to auscultation bilaterally    ABDOMEN: Soft, nontender, nondistended.    EXT:  No edema  :  Without Petit catheter.  MSK: No joint deformity  SKIN: Warm and dry without cutaneous eruptions    NEURO: Oriented to PPT.  PSYCHIATRIC: Normal insight and judgement. Cooperative with PE    Laboratory Data        Results from last 7 days   Lab Units 08/10/21  0620   SODIUM mmol/L 138   POTASSIUM mmol/L 3.8   CHLORIDE mmol/L 103   CO2 mmol/L 25.0   BUN mg/dL 13   CREATININE mg/dL 0.91   GLUCOSE mg/dL 119*   CALCIUM mg/dL 9.2     Results from last 7 days   Lab Units 21  1356   ALK PHOS U/L 102   BILIRUBIN mg/dL 0.3   ALT (SGPT) U/L 22   AST (SGOT) U/L 17                     Results from last 7 days   Lab Units 21  0748   VANCOMYCIN TR mcg/mL 9.30     Estimated Creatinine  Clearance: 144.3 mL/min (by C-G formula based on SCr of 0.91 mg/dL).      Microbiology:  No results found for: ACANTHNAEG, AFBCX, BPERTUSSISCX, BLOODCX  No results found for: BCIDPCR, CXREFLEX, CSFCX, CULTURETIS  No results found for: CULTURES, HSVCX, URCX  No results found for: EYECULTURE, GCCX, HSVCULTURE, LABHSV  No results found for: LEGIONELLA, MRSACX, MUMPSCX, MYCOPLASCX  No results found for: NOCARDIACX, STOOLCX  No results found for: THROATCX, UNSTIMCULT, URINECX, CULTURE, VZVCULTUR  No results found for: VIRALCULTU, WOUNDCX        Radiology:  Imaging Results (Last 72 Hours)     ** No results found for the last 72 hours. **            Impression:   Lumbar wound infection  Status post L3-L4 discectomy  Back pain  PLAN/RECOMMENDATIONS:   Thank you for asking us to see Wenceslao Nava, I recommend the following:  Change vancomycin to daptomycin 6 mg/kg IV now  Continue Rocephin 2 g IV daily    Follow-up operative cultures growth present awaiting final results    Case discussed with , family, [facility] office    Daptomycin 500 mg daily for 2 weeks  Rocephin 2 g IV daily for 2 weeks  PICC line care  Follow-up in 1 week  Abelardo Hansen MD  8/12/2021  15:22 EDT

## 2021-08-12 NOTE — DISCHARGE SUMMARY
DISCHARGE SUMMARY  PATIENT:  SENAIT NAVA  YOB: 1980  VISIT ID:  [id number]  PRIMARY CARE:  Flores Chua PA  ADMITTING PHYSICIAN:  Sreekanth Sorto MD    DATE OF ADMISSION:  8/9/2021  DATE OF DISCHARGE: 8/12/2021      ADMITTING DIAGNOSIS:  Lumbar wound infection    DISCHARGE DIAGNOSIS:  Same    Past Medical History:   Diagnosis Date   • Arthritis    • Diabetes mellitus (CMS/HCC)     po meds only    • Elevated cholesterol    • Hypertension    • Knee pain, left    • Wound infection after surgery 07/2021         PROCEDURES:  Debridement of infected surgical wound    BRIEF HOSPITAL COURSE:  Mr. Nava is a 41 y.o. male is well-known to the neurosurgical practice for undergoing a right-sided L3-4 lamina foraminotomy on 6/24/2021.  Patient was noted to have a very scant amount of serosanguineous drainage on 7/16/2021 that was collected sterilely.  Patient had elevated baseline inflammatory markers wound cultures showed light growth of Staph epidermidis.  Patient was prescribed Bactrim DS and doxycycline.    Unfortunately patient continued to drain and he was having serial wound checks every couple of days and ultimately was required to undergo a wound exploration and debridement for infection.  Intraoperative cultures sent.  Final results still pending.    Dr. Hansen was asked to see the patient and has recommended daptomycin and Rocephin in outpatient fashion.  Patient had a PICC line placed yesterday and is getting education from the infusion nurse as we speak.    Patient's pain has been under control and has been ambulating around the unit.    Patient has sutures in and will require suture removal in about 10 days postop which would be around 8-18/8-20.        DISCHARGE MEDICATIONS:     Discharge Medications      ASK your doctor about these medications      Instructions Start Date   aspirin 81 MG EC tablet   81 mg, Oral, Daily      atorvastatin 20 MG tablet  Commonly known as: LIPITOR   20  mg, Oral, Daily      cyclobenzaprine 10 MG tablet  Commonly known as: FLEXERIL   10 mg, Oral, 3 Times Daily PRN      docusate sodium 100 MG capsule  Commonly known as: Colace   100 mg, Oral, 3 Times Daily PRN      FLUoxetine 40 MG capsule  Commonly known as: PROzac   40 mg, Oral, Daily      gabapentin 400 MG capsule  Commonly known as: NEURONTIN   400 mg, Oral, 3 Times Daily      HYDROcodone-acetaminophen 7.5-325 MG per tablet  Commonly known as: NORCO   1 tablet, Oral, 3 Times Daily      lisinopril-hydrochlorothiazide 10-12.5 MG per tablet  Commonly known as: PRINZIDE,ZESTORETIC   1 tablet, Oral, Daily      meloxicam 15 MG tablet  Commonly known as: MOBIC   15 mg, Oral, Daily      metFORMIN  MG 24 hr tablet  Commonly known as: GLUCOPHAGE-XR   750 mg, Oral, 2 times daily      nabumetone 500 MG tablet  Commonly known as: RELAFEN   500 mg, Oral, 2 Times Daily PRN      oxyCODONE-acetaminophen 7.5-325 MG per tablet  Commonly known as: PERCOCET   1 tablet, Oral, As Needed      Trulicity 0.75 MG/0.5ML solution pen-injector  Generic drug: Dulaglutide   0.75 mg, Subcutaneous, Weekly, fridays               ACTIVITY:  No heavy lifting bending or twisting.  Patient should keep incision clean and dry.    DIET:  Normal    FOLLOW UP:       Contact information for follow-up providers     Abelardo Hansen MD Follow up.    Specialty: Infectious Diseases  Why: August 18, 2021 @ 10:45 a.m.  Contact information:  [address]  [facility] [address]  [place] [address] [phone]             Flores Parker PA-C Follow up on 8/20/2021.    Specialties: Physician Assistant, Neurosurgery  Why: suture removal  Contact information:  [address]  [facility] [address]  [place] [address] [phone]                   Contact information for after-discharge care     Dialysis/Infusion     [facility] .    Service: Infusion and IV Therapy  Contact information:  [address]  [facility]  [address]  [phone]                 Home Medical Care     [facility] DEPT [facility] .    Service: Home Health Services  Contact information:  [address] [facility] [address] [phone]

## 2021-08-12 NOTE — PLAN OF CARE
Goal Outcome Evaluation:      Pt with significant amt of pain this shift. Has required iv and po prn pain meds.  Lumbar dressing stained. Picc line in place. Ambulating with assist of one staff.  Tolerating well.  Vss.

## 2021-08-12 NOTE — CASE MANAGEMENT/SOCIAL WORK
Case Management Discharge Note      Final Note: Case mgt f/u Orders noted for home IV antibiotics. Arangements complete for [facility] infusion to provide home IV Dapto and Rocephin,they will also provide instructions. Referred to [facility] to do PICC care and lab work. I spoke with Rocío at [phone] who accepted referral, they will plan to see at home for initial home dose and next Mon or Tues for PICC site care and labs. Mr mazariegos in agreement with plan.         Selected Continued Care - Admitted Since 8/9/2021     Destination    No services have been selected for the patient.              Durable Medical Equipment    No services have been selected for the patient.              Dialysis/Infusion Coordination complete.    Service Provider Selected Services Address Phone Fax Patient Preferred    [facility]  Infusion and IV Therapy [address], [facility] [address] [phone] --          Home Medical Care Coordination complete.    Service Provider Selected Services Address Phone Fax Patient Preferred    [facility] [address] [place] [address] [phone] --          Therapy    No services have been selected for the patient.              Community Resources    No services have been selected for the patient.              Community & DME    No services have been selected for the patient.                       Final Discharge Disposition Code: 06 - home with home health care

## 2021-08-12 NOTE — PROGRESS NOTES
NEUROSURGERY PROGRESS NOTE     LOS: 0 days   Patient Care Team:  Flores Chua PA as PCP - General (Physician Assistant)  Flores Chua PA as Referring Physician (Physician Assistant)    Chief Complaint: Postop lumbar wound drainage.    POD#: 3 Days Post-Op  Procedures:  Lumbar wound I&D.    Interval History:   PICC line now in place.  Patient Complaints: Ongoing low-grade back and right hip pain.  Patient Denies: Weakness or numbness.    Vital Signs: Blood pressure 139/83, pulse 68, temperature 97.6 °F (36.4 °C), temperature source Oral, resp. rate 20, SpO2 98 %.  Intake/Output:     Intake/Output Summary (Last 24 hours) at 8/12/2021 0627  Last data filed at 8/11/2021 1800  Gross per 24 hour   Intake 900 ml   Output --   Net 900 ml       Physical Exam:  The patient is awake and alert.  He is in good spirits.  Mild heme staining is noted on his dressing.     Data Review:    Await intraoperative culture results.    Assessment/Plan:  1.  Postop lumbar wound drainage status post I&D.  2.  History of right L3-4 discectomy on 6/24/2021.  3.  Diabetes mellitus.  4.  Obesity.  5.  Disposition: Appreciate ID input.  Await final cultures.  Continue IV antibiotics.  PICC line/outpatient antibiotics and home when okay with ID.  Home soon.      Sreekanth Sorto MD  08/12/21  06:27 EDT

## 2021-08-13 ENCOUNTER — TELEPHONE (OUTPATIENT)
Dept: NEUROSURGERY | Facility: CLINIC | Age: 41
End: 2021-08-13

## 2021-08-13 NOTE — TELEPHONE ENCOUNTER
DOCUMENTATION ONLY    PA submitted for Oxycodone ER 15mg.  I will notify patient when I receive a response.

## 2021-08-14 LAB — BACTERIA SPEC ANAEROBE CULT: NORMAL

## 2021-08-16 ENCOUNTER — LAB REQUISITION (OUTPATIENT)
Dept: LAB | Facility: HOSPITAL | Age: 41
End: 2021-08-16

## 2021-08-16 ENCOUNTER — TELEPHONE (OUTPATIENT)
Dept: NEUROSURGERY | Facility: CLINIC | Age: 41
End: 2021-08-16

## 2021-08-16 DIAGNOSIS — R79.89 OTHER SPECIFIED ABNORMAL FINDINGS OF BLOOD CHEMISTRY: ICD-10-CM

## 2021-08-16 DIAGNOSIS — M51.36 DDD (DEGENERATIVE DISC DISEASE), LUMBAR: ICD-10-CM

## 2021-08-16 DIAGNOSIS — L08.9 WOUND INFECTION: ICD-10-CM

## 2021-08-16 DIAGNOSIS — L24.A9 WOUND DRAINAGE: ICD-10-CM

## 2021-08-16 DIAGNOSIS — M51.26 HNP (HERNIATED NUCLEUS PULPOSUS), LUMBAR: Primary | ICD-10-CM

## 2021-08-16 DIAGNOSIS — T14.8XXA WOUND INFECTION: ICD-10-CM

## 2021-08-16 LAB
ALBUMIN SERPL-MCNC: 3.89 G/DL (ref 3.5–5.2)
ALBUMIN/GLOB SERPL: 1.1 G/DL
ALP SERPL-CCNC: 113 U/L (ref 39–117)
ALT SERPL W P-5'-P-CCNC: 27 U/L (ref 1–41)
ANION GAP SERPL CALCULATED.3IONS-SCNC: 11.7 MMOL/L (ref 5–15)
AST SERPL-CCNC: 18 U/L (ref 1–40)
BASOPHILS # BLD AUTO: 0.06 10*3/MM3 (ref 0–0.2)
BASOPHILS NFR BLD AUTO: 0.9 % (ref 0–1.5)
BILIRUB SERPL-MCNC: 0.3 MG/DL (ref 0–1.2)
BUN SERPL-MCNC: 16 MG/DL (ref 6–20)
BUN/CREAT SERPL: 17.4 (ref 7–25)
CALCIUM SPEC-SCNC: 9.1 MG/DL (ref 8.6–10.5)
CHLORIDE SERPL-SCNC: 99 MMOL/L (ref 98–107)
CK SERPL-CCNC: 38 U/L (ref 20–200)
CO2 SERPL-SCNC: 24.3 MMOL/L (ref 22–29)
CREAT SERPL-MCNC: 0.92 MG/DL (ref 0.76–1.27)
CRP SERPL-MCNC: 4.77 MG/DL (ref 0–0.5)
DEPRECATED RDW RBC AUTO: 38.3 FL (ref 37–54)
EOSINOPHIL # BLD AUTO: 0.38 10*3/MM3 (ref 0–0.4)
EOSINOPHIL NFR BLD AUTO: 5.4 % (ref 0.3–6.2)
ERYTHROCYTE [DISTWIDTH] IN BLOOD BY AUTOMATED COUNT: 12 % (ref 12.3–15.4)
ERYTHROCYTE [SEDIMENTATION RATE] IN BLOOD: 59 MM/HR (ref 0–15)
GFR SERPL CREATININE-BSD FRML MDRD: 91 ML/MIN/1.73
GLOBULIN UR ELPH-MCNC: 3.6 GM/DL
GLUCOSE SERPL-MCNC: 98 MG/DL (ref 65–99)
HCT VFR BLD AUTO: 34.6 % (ref 37.5–51)
HGB BLD-MCNC: 11.7 G/DL (ref 13–17.7)
IMM GRANULOCYTES # BLD AUTO: 0.02 10*3/MM3 (ref 0–0.05)
IMM GRANULOCYTES NFR BLD AUTO: 0.3 % (ref 0–0.5)
LYMPHOCYTES # BLD AUTO: 1.61 10*3/MM3 (ref 0.7–3.1)
LYMPHOCYTES NFR BLD AUTO: 23.1 % (ref 19.6–45.3)
MCH RBC QN AUTO: 29.1 PG (ref 26.6–33)
MCHC RBC AUTO-ENTMCNC: 33.8 G/DL (ref 31.5–35.7)
MCV RBC AUTO: 86.1 FL (ref 79–97)
MONOCYTES # BLD AUTO: 0.74 10*3/MM3 (ref 0.1–0.9)
MONOCYTES NFR BLD AUTO: 10.6 % (ref 5–12)
NEUTROPHILS NFR BLD AUTO: 4.17 10*3/MM3 (ref 1.7–7)
NEUTROPHILS NFR BLD AUTO: 59.7 % (ref 42.7–76)
NRBC BLD AUTO-RTO: 0 /100 WBC (ref 0–0.2)
PLATELET # BLD AUTO: 378 10*3/MM3 (ref 140–450)
PMV BLD AUTO: 9.8 FL (ref 6–12)
POTASSIUM SERPL-SCNC: 3.9 MMOL/L (ref 3.5–5.2)
PROT SERPL-MCNC: 7.5 G/DL (ref 6–8.5)
RBC # BLD AUTO: 4.02 10*6/MM3 (ref 4.14–5.8)
SODIUM SERPL-SCNC: 135 MMOL/L (ref 136–145)
WBC # BLD AUTO: 6.98 10*3/MM3 (ref 3.4–10.8)

## 2021-08-16 PROCEDURE — 85025 COMPLETE CBC W/AUTO DIFF WBC: CPT | Performed by: PHYSICIAN ASSISTANT

## 2021-08-16 PROCEDURE — 80053 COMPREHEN METABOLIC PANEL: CPT | Performed by: PHYSICIAN ASSISTANT

## 2021-08-16 PROCEDURE — 86140 C-REACTIVE PROTEIN: CPT | Performed by: PHYSICIAN ASSISTANT

## 2021-08-16 PROCEDURE — 85652 RBC SED RATE AUTOMATED: CPT | Performed by: PHYSICIAN ASSISTANT

## 2021-08-16 PROCEDURE — 82550 ASSAY OF CK (CPK): CPT | Performed by: PHYSICIAN ASSISTANT

## 2021-08-16 NOTE — TELEPHONE ENCOUNTER
Provider:  Macario  Caller: brannon  Time of call:   9:36  Phone #:  [phone]  Surgery:  I & D  Surgery Date:  08/09/21  Last visit:   surgery  Next visit:     CINDY:         Reason for call:     Patient notified his Oxycodone ER 15 mg has been denied even after a PA was submitted.  This was denied because patient was not prescribed Naloxone with the Rx.    Patient wants to know if we can cancel this Rx with [facility] in [place] and send it to [facility] in [place] where he lives?    Rx pended if approved.

## 2021-08-16 NOTE — TELEPHONE ENCOUNTER
Caller: CARLOS Bashir    Relationship to patient: Emergency Contact    CARLOS CALLED TO SEE IF THE POST-OP FOR PATIENT COULD BE SCHEDULED FOR THIS Wednesday AS HE HAS ANOTHER APPOINTMENT ON THE SAME DAY AND THEY HAVE TO TRAVEL 1.5 HOURS TO CLINIC

## 2021-08-17 RX ORDER — OXYCODONE HYDROCHLORIDE 15 MG/1
15 TABLET, FILM COATED, EXTENDED RELEASE ORAL EVERY 12 HOURS SCHEDULED
Qty: 14 TABLET | Refills: 0 | OUTPATIENT
Start: 2021-08-17 | End: 2021-08-27

## 2021-08-18 ENCOUNTER — OFFICE VISIT (OUTPATIENT)
Dept: NEUROSURGERY | Facility: CLINIC | Age: 41
End: 2021-08-18

## 2021-08-18 VITALS
HEIGHT: 72 IN | WEIGHT: 253 LBS | SYSTOLIC BLOOD PRESSURE: 122 MMHG | TEMPERATURE: 97.1 F | BODY MASS INDEX: 34.27 KG/M2 | DIASTOLIC BLOOD PRESSURE: 70 MMHG

## 2021-08-18 DIAGNOSIS — T81.49XA WOUND INFECTION AFTER SURGERY: ICD-10-CM

## 2021-08-18 DIAGNOSIS — L08.9 WOUND INFECTION: ICD-10-CM

## 2021-08-18 DIAGNOSIS — R53.81 PHYSICAL DECONDITIONING: ICD-10-CM

## 2021-08-18 DIAGNOSIS — T14.8XXA WOUND INFECTION: ICD-10-CM

## 2021-08-18 DIAGNOSIS — G89.29 CHRONIC BILATERAL LOW BACK PAIN WITH BILATERAL SCIATICA: ICD-10-CM

## 2021-08-18 DIAGNOSIS — M47.812 CERVICAL SPONDYLOSIS WITHOUT MYELOPATHY: ICD-10-CM

## 2021-08-18 DIAGNOSIS — M19.90 ARTHRITIS: ICD-10-CM

## 2021-08-18 DIAGNOSIS — M51.36 DDD (DEGENERATIVE DISC DISEASE), LUMBAR: Primary | ICD-10-CM

## 2021-08-18 DIAGNOSIS — M54.42 CHRONIC BILATERAL LOW BACK PAIN WITH BILATERAL SCIATICA: ICD-10-CM

## 2021-08-18 DIAGNOSIS — M54.41 CHRONIC BILATERAL LOW BACK PAIN WITH BILATERAL SCIATICA: ICD-10-CM

## 2021-08-18 PROCEDURE — 99024 POSTOP FOLLOW-UP VISIT: CPT | Performed by: PHYSICIAN ASSISTANT

## 2021-08-18 RX ORDER — EPINEPHRINE 0.15 MG/.3ML
INJECTION INTRAMUSCULAR
COMMUNITY
Start: 2021-08-12 | End: 2023-03-01

## 2021-08-18 RX ORDER — SODIUM CHLORIDE 9 MG/ML
INJECTION, SOLUTION INTRAVENOUS
COMMUNITY
Start: 2021-08-12 | End: 2023-03-01

## 2021-08-18 RX ORDER — TIZANIDINE 4 MG/1
4 TABLET ORAL EVERY 8 HOURS PRN
Qty: 30 TABLET | Refills: 1 | Status: SHIPPED | OUTPATIENT
Start: 2021-08-18

## 2021-08-18 RX ORDER — DAPTOMYCIN 50 MG/ML
INJECTION, POWDER, LYOPHILIZED, FOR SOLUTION INTRAVENOUS
COMMUNITY
Start: 2021-08-12 | End: 2022-08-29

## 2021-08-18 RX ORDER — OXYCODONE HYDROCHLORIDE AND ACETAMINOPHEN 5; 325 MG/1; MG/1
TABLET ORAL
COMMUNITY
Start: 2021-08-06 | End: 2021-09-01 | Stop reason: SDUPTHER

## 2021-08-18 RX ORDER — LISINOPRIL AND HYDROCHLOROTHIAZIDE 20; 12.5 MG/1; MG/1
TABLET ORAL
COMMUNITY
Start: 2021-08-06

## 2021-08-18 RX ORDER — SODIUM CHLORIDE 0.9 % (FLUSH) 0.9 %
SYRINGE (ML) INJECTION
COMMUNITY
Start: 2021-08-12 | End: 2021-10-11

## 2021-08-18 RX ORDER — DIPHENHYDRAMINE HCL 50 MG
CAPSULE ORAL
COMMUNITY
Start: 2021-08-12 | End: 2023-03-01

## 2021-08-18 NOTE — PROGRESS NOTES
Wenceslao Nava  1980 08/18/2021  [id number]    CC: Bilateral hip pain    HPI:  S/P right L3-4 laminotomy, medial facetectomy, foraminotomy and discectomy on 6/24/2021.  He has had significant improvement in his right leg radicular symptoms however as he has had a considerable amount of low back pain and bilateral hip pain.  He unfortunately developed a wound infection and as such he went back for a incision and drainage, cultures revealed gram-positive cocci.  The patient has a PICC line and is undergoing IV antibiotic therapy with infectious disease.  The patient reports today that he has 1 more week of IV antibiotics and then will be transitioned to p.o. antibiotics.  The patient also reports that he is getting muscle spasms in his legs.  No new weakness in his legs.  The bilateral hip pain is severe and is worse with sitting and walking.    Past Medical History:   Diagnosis Date   • Arthritis    • Diabetes mellitus (CMS/[facility])     po meds only    • Elevated cholesterol    • Hypertension    • Knee pain, left    • Wound infection after surgery 07/2021       No Known Allergies      Current Outpatient Medications:   •  aspirin (aspirin) 81 MG EC tablet, Take 1 tablet by mouth Daily. (Patient taking differently: Take 81 mg by mouth Daily. Will stop for surgery), Disp: 30 tablet, Rfl: 0  •  atorvastatin (LIPITOR) 20 MG tablet, Take 20 mg by mouth Daily., Disp: , Rfl:   •  cyclobenzaprine (FLEXERIL) 10 MG tablet, Take 1 tablet by mouth 3 (Three) Times a Day As Needed for Muscle Spasms., Disp: 60 tablet, Rfl: 0  •  DAPTOmycin (CUBICIN) 500 MG injection, , Disp: , Rfl:   •  DAPTOmycin 550 mg in sodium chloride 0.9 % 50 mL, Infuse 550 mg into a venous catheter Daily for 6 doses. Indications: Bone and/or Joint Infection, Disp: , Rfl:   •  diphenhydrAMINE (BENADRYL) 50 MG capsule, , Disp: , Rfl:   •  docusate sodium (Colace) 100 MG capsule, Take 1 capsule by mouth 3 (Three) Times a Day As Needed for Constipation.,  "Disp: 30 capsule, Rfl: 0  •  Dulaglutide (Trulicity) 0.75 MG/0.5ML solution pen-injector, Inject 0.75 mg under the skin into the appropriate area as directed 1 (One) Time Per Week. fridays, Disp: , Rfl:   •  EPINEPHrine (EPIPEN JR) 0.15 MG/0.3ML solution auto-injector injection, , Disp: , Rfl:   •  FLUoxetine (PROzac) 40 MG capsule, Take 40 mg by mouth Daily., Disp: , Rfl:   •  gabapentin (NEURONTIN) 400 MG capsule, Take 400 mg by mouth 3 (Three) Times a Day., Disp: , Rfl:   •  lisinopril-hydrochlorothiazide (PRINZIDE,ZESTORETIC) 20-12.5 MG per tablet, , Disp: , Rfl:   •  metFORMIN ER (GLUCOPHAGE-XR) 750 MG 24 hr tablet, Take 750 mg by mouth 2 (two) times a day., Disp: , Rfl:   •  nabumetone (RELAFEN) 500 MG tablet, Take 500 mg by mouth 2 (Two) Times a Day As Needed for Mild Pain ., Disp: , Rfl:   •  oxyCODONE ER (oxyCONTIN) 15 MG tablet extended-release 12 hour, Take 1 tablet by mouth Every 12 (Twelve) Hours, Disp: 14 tablet, Rfl: 0  •  oxyCODONE-acetaminophen (PERCOCET) 5-325 MG per tablet, , Disp: , Rfl:   •  sodium chloride 0.9 % solution, , Disp: , Rfl:   •  Sodium Chloride Flush (sodium chloride 0.9 % flush) 0.9 % solution, , Disp: , Rfl:       PE:  /70 (BP Location: Left arm, Patient Position: Standing, Cuff Size: Adult)   Temp 97.1 °F (36.2 °C)   Ht 182.9 cm (72\")   Wt 115 kg (253 lb)   BMI 34.31 kg/m²   Heart- RRR  Lungs- no wheezing, normal expansion    Wound-flat, healing well.  Sutures removed today without any difficulties.    Neurologic Exam   Slow steady steps without focal weakness.  Pain to palpation of both hips.  Severe decreased range of motion of the lumbar spine with flexion and extension.    MDM   I have prescribed Zanaflex and instead of the Flexeril.  I would like him to continue to ambulate as much as possible.  He has a follow-up with infectious disease next week and I will plan on seeing him in the [place] neurosurgery clinic in 1 month.  I have asked him to give me a call to see " how he responds to the change in his muscle relaxant.  He continues on Percocet and Neurontin for pain.    Danita Peña, PAC

## 2021-08-23 ENCOUNTER — LAB REQUISITION (OUTPATIENT)
Dept: LAB | Facility: HOSPITAL | Age: 41
End: 2021-08-23

## 2021-08-23 DIAGNOSIS — B95.7 OTHER STAPHYLOCOCCUS AS THE CAUSE OF DISEASES CLASSIFIED ELSEWHERE: ICD-10-CM

## 2021-08-23 DIAGNOSIS — Z45.2 ENCOUNTER FOR ADJUSTMENT AND MANAGEMENT OF VASCULAR ACCESS DEVICE: ICD-10-CM

## 2021-08-23 DIAGNOSIS — E11.9 TYPE 2 DIABETES MELLITUS WITHOUT COMPLICATIONS (HCC): ICD-10-CM

## 2021-08-23 LAB
ALBUMIN SERPL-MCNC: 4.07 G/DL (ref 3.5–5.2)
ALBUMIN/GLOB SERPL: 1.3 G/DL
ALP SERPL-CCNC: 110 U/L (ref 39–117)
ALT SERPL W P-5'-P-CCNC: 24 U/L (ref 1–41)
ANION GAP SERPL CALCULATED.3IONS-SCNC: 12.4 MMOL/L (ref 5–15)
AST SERPL-CCNC: 16 U/L (ref 1–40)
BASOPHILS # BLD AUTO: 0.04 10*3/MM3 (ref 0–0.2)
BASOPHILS NFR BLD AUTO: 0.5 % (ref 0–1.5)
BILIRUB SERPL-MCNC: 0.2 MG/DL (ref 0–1.2)
BUN SERPL-MCNC: 14 MG/DL (ref 6–20)
BUN/CREAT SERPL: 15.1 (ref 7–25)
CALCIUM SPEC-SCNC: 9.1 MG/DL (ref 8.6–10.5)
CHLORIDE SERPL-SCNC: 102 MMOL/L (ref 98–107)
CK SERPL-CCNC: 58 U/L (ref 20–200)
CO2 SERPL-SCNC: 25.6 MMOL/L (ref 22–29)
CREAT SERPL-MCNC: 0.93 MG/DL (ref 0.76–1.27)
CRP SERPL-MCNC: 2.01 MG/DL (ref 0–0.5)
DEPRECATED RDW RBC AUTO: 37.8 FL (ref 37–54)
EOSINOPHIL # BLD AUTO: 0.3 10*3/MM3 (ref 0–0.4)
EOSINOPHIL NFR BLD AUTO: 3.9 % (ref 0.3–6.2)
ERYTHROCYTE [DISTWIDTH] IN BLOOD BY AUTOMATED COUNT: 12 % (ref 12.3–15.4)
ERYTHROCYTE [SEDIMENTATION RATE] IN BLOOD: 59 MM/HR (ref 0–15)
GFR SERPL CREATININE-BSD FRML MDRD: 90 ML/MIN/1.73
GLOBULIN UR ELPH-MCNC: 3.2 GM/DL
GLUCOSE SERPL-MCNC: 66 MG/DL (ref 65–99)
HCT VFR BLD AUTO: 32.7 % (ref 37.5–51)
HGB BLD-MCNC: 10.8 G/DL (ref 13–17.7)
IMM GRANULOCYTES # BLD AUTO: 0.02 10*3/MM3 (ref 0–0.05)
IMM GRANULOCYTES NFR BLD AUTO: 0.3 % (ref 0–0.5)
LYMPHOCYTES # BLD AUTO: 1.46 10*3/MM3 (ref 0.7–3.1)
LYMPHOCYTES NFR BLD AUTO: 19.1 % (ref 19.6–45.3)
MCH RBC QN AUTO: 28.4 PG (ref 26.6–33)
MCHC RBC AUTO-ENTMCNC: 33 G/DL (ref 31.5–35.7)
MCV RBC AUTO: 86.1 FL (ref 79–97)
MONOCYTES # BLD AUTO: 0.59 10*3/MM3 (ref 0.1–0.9)
MONOCYTES NFR BLD AUTO: 7.7 % (ref 5–12)
NEUTROPHILS NFR BLD AUTO: 5.25 10*3/MM3 (ref 1.7–7)
NEUTROPHILS NFR BLD AUTO: 68.5 % (ref 42.7–76)
NRBC BLD AUTO-RTO: 0 /100 WBC (ref 0–0.2)
PLATELET # BLD AUTO: 339 10*3/MM3 (ref 140–450)
PMV BLD AUTO: 10 FL (ref 6–12)
POTASSIUM SERPL-SCNC: 3.5 MMOL/L (ref 3.5–5.2)
PROT SERPL-MCNC: 7.3 G/DL (ref 6–8.5)
RBC # BLD AUTO: 3.8 10*6/MM3 (ref 4.14–5.8)
SODIUM SERPL-SCNC: 140 MMOL/L (ref 136–145)
WBC # BLD AUTO: 7.66 10*3/MM3 (ref 3.4–10.8)

## 2021-08-23 PROCEDURE — 86140 C-REACTIVE PROTEIN: CPT | Performed by: INTERNAL MEDICINE

## 2021-08-23 PROCEDURE — 85652 RBC SED RATE AUTOMATED: CPT | Performed by: INTERNAL MEDICINE

## 2021-08-23 PROCEDURE — 85025 COMPLETE CBC W/AUTO DIFF WBC: CPT | Performed by: INTERNAL MEDICINE

## 2021-08-23 PROCEDURE — 80053 COMPREHEN METABOLIC PANEL: CPT | Performed by: INTERNAL MEDICINE

## 2021-08-23 PROCEDURE — 82550 ASSAY OF CK (CPK): CPT | Performed by: INTERNAL MEDICINE

## 2021-08-24 ENCOUNTER — TELEPHONE (OUTPATIENT)
Dept: NEUROSURGERY | Facility: CLINIC | Age: 41
End: 2021-08-24

## 2021-08-24 NOTE — TELEPHONE ENCOUNTER
Caller: Patient    Best call back number: [phone]    Medication needed: PERCOCET       When do you need the refill by: TODAY    What additional details did the patient provide when requesting the medication:  PATIENT WOULD LIKE TO INCREASE THE STRENGTH STATING THE CURRENT STRENGTH IS NOT WORKING    Does the patient have less than a 3 day supply:  [x] Yes  [] No    What is the patient's preferred pharmacy:    [facility]

## 2021-08-24 NOTE — TELEPHONE ENCOUNTER
Called pt and informed that he is to take meds as prescribed, not more.      Pt states he did NOT ask to take more, he asked if the dose could be increased to 10/325mg.      Pt aware it will be tomorrow before we are able to call him back.

## 2021-08-24 NOTE — TELEPHONE ENCOUNTER
Nazario: 08/12/2021 Oxycodone/Acetaminophen  325MG/7.5MG  1980 [address] [facility]  08/18/2021 Gabapentin 400MG 1980 90 [address] [facility] [place] 1  08/18/2021 Oxycontin 15MG 1980 [address] [facility] 2

## 2021-08-24 NOTE — TELEPHONE ENCOUNTER
Provider:  Macario  Caller: patient  Time of call:   2:02  Phone #:  [phone]  Surgery:  I & D  Surgery Date:  08/09/21  Last visit:  08/18/21   Next visit: 09/16/21    CINDY:         Reason for call:     Patient requests to take his Percocet more often.    Please advise.

## 2021-08-24 NOTE — TELEPHONE ENCOUNTER
Pt's girlfriend, Ana Laura called to check above message: told her the message has been sent to the physician and someone would contact them to let them know.

## 2021-08-25 RX ORDER — DOXYCYCLINE 100 MG/1
CAPSULE ORAL
Qty: 20 CAPSULE | Refills: 0 | Status: SHIPPED | OUTPATIENT
Start: 2021-08-25 | End: 2022-08-29

## 2021-08-25 RX ORDER — CYCLOBENZAPRINE HCL 10 MG
TABLET ORAL
Qty: 60 TABLET | Refills: 0 | Status: SHIPPED | OUTPATIENT
Start: 2021-08-25 | End: 2022-08-29

## 2021-08-25 NOTE — TELEPHONE ENCOUNTER
Provider:  Macario  Caller: automated refill rx   Time of call:   --  Phone #:  [phone]  Surgery:  I & D  Surgery Date:  08/09/21  Last visit:   Office Visit with Fabby Peña PA-C (08/18/2021)    Next visit: Appointment with Fabby Peña PA-C (09/16/2021)      Requested Prescriptions     Pending Prescriptions Disp Refills   • cyclobenzaprine (FLEXERIL) 10 MG tablet [Pharmacy Med Name: CYCLOBENZAPRINE 10 MG TAB 10 Tablet] 60 tablet 0     Sig: TAKE ONE TABLET BY MOUTH THREE TIMES A DAY AS NEEDED FOR MUSCULAR SPASMS (MAY CAUSE DROWSINESS)   • doxycycline (MONODOX) 100 MG capsule [Pharmacy Med Name: DOXYCYCLINE MONO 100 MG  Capsule] 20 capsule 0     Sig: TAKE ONE CAPSULE BY MOUTH TWO TIMES A DAY (TAKE WITH FOOD)

## 2021-08-27 ENCOUNTER — TELEPHONE (OUTPATIENT)
Dept: NEUROSURGERY | Facility: CLINIC | Age: 41
End: 2021-08-27

## 2021-08-27 DIAGNOSIS — T14.8XXA WOUND INFECTION: ICD-10-CM

## 2021-08-27 DIAGNOSIS — M54.41 CHRONIC BILATERAL LOW BACK PAIN WITH BILATERAL SCIATICA: Primary | ICD-10-CM

## 2021-08-27 DIAGNOSIS — T81.49XA WOUND INFECTION AFTER SURGERY: ICD-10-CM

## 2021-08-27 DIAGNOSIS — Z98.890 S/P LUMBAR DISCECTOMY: ICD-10-CM

## 2021-08-27 DIAGNOSIS — G89.29 CHRONIC BILATERAL LOW BACK PAIN WITH BILATERAL SCIATICA: Primary | ICD-10-CM

## 2021-08-27 DIAGNOSIS — L08.9 WOUND INFECTION: ICD-10-CM

## 2021-08-27 DIAGNOSIS — M51.36 DDD (DEGENERATIVE DISC DISEASE), LUMBAR: ICD-10-CM

## 2021-08-27 DIAGNOSIS — M54.42 CHRONIC BILATERAL LOW BACK PAIN WITH BILATERAL SCIATICA: Primary | ICD-10-CM

## 2021-08-27 NOTE — TELEPHONE ENCOUNTER
Provider:  Danita PAULSON  Caller: patient  Time of call:     Phone #:  [phone]  Surgery:  08/09/21  Surgery Date:  I & D   Last visit:   08/18/21  Next visit: 09/16/21    CINDY:         Reason for call:     Patient called and asked to speak with Danita PAULSON.  He mentioned he would like to have a MRI prior to his follow up on 09/16/21.  He did request a refill on Zanaflex but this was just filled 2 days ago.     He said he has had buttock and bilateral leg pain since his   I & D.  He has sharp pain that runs down his legs into his feet.  R> L.

## 2021-08-30 NOTE — TELEPHONE ENCOUNTER
Spoke to patient. Patient requested [facility] location for MRI. Patient made aware of date and time to arrive for test.

## 2021-09-01 DIAGNOSIS — Z98.890 S/P LUMBAR DISCECTOMY: ICD-10-CM

## 2021-09-01 DIAGNOSIS — T14.8XXA WOUND INFECTION: ICD-10-CM

## 2021-09-01 DIAGNOSIS — G89.29 CHRONIC BILATERAL LOW BACK PAIN WITH BILATERAL SCIATICA: ICD-10-CM

## 2021-09-01 DIAGNOSIS — L08.9 WOUND INFECTION: ICD-10-CM

## 2021-09-01 DIAGNOSIS — M54.42 CHRONIC BILATERAL LOW BACK PAIN WITH BILATERAL SCIATICA: ICD-10-CM

## 2021-09-01 DIAGNOSIS — M54.41 CHRONIC BILATERAL LOW BACK PAIN WITH BILATERAL SCIATICA: ICD-10-CM

## 2021-09-01 DIAGNOSIS — M51.36 DDD (DEGENERATIVE DISC DISEASE), LUMBAR: Primary | ICD-10-CM

## 2021-09-01 RX ORDER — CYCLOBENZAPRINE HCL 10 MG
TABLET ORAL
Qty: 60 TABLET | Refills: 0 | OUTPATIENT
Start: 2021-09-01

## 2021-09-01 NOTE — TELEPHONE ENCOUNTER
Provider:  Macario  Caller: wife  Time of call:   11:22  Phone #:  [phone]  Surgery:  I & D  Surgery Date:  08/09/21  Last visit:   08/18/21  Next visit: 09/16/21    CINDY:  08/12/2021 Oxycodone/Acetaminophen  325MG/7.5MG  1980 40 7 MACARIO MENESES [facility]  Retail Pharmacy  [facility] 64 2  08/18/2021 Gabapentin 400MG 1980 90 30 [facility] [place] 1  08/18/2021 Oxycontin 15MG 1980 14 7 MACARIO MENESES [facility]  Retail Pharmacy  [facility] 45 2     07/09/2021 Hydrocodone/Acetaminophen  325MG/7.5MG  1980 90 30 [facility] [place] [place] 23 1  07/16/2021 Oxycodone/Acetaminophen  325MG/7.5MG  1980 30 10 MACARIO MENESES [place] [facility] [place] 34 1  07/29/2021 Oxycodone/Acetaminophen 325MG/5MG 1980 30 10 MACARIO MENESES [place] [facility] [place] 23 1  08/06/2021 Oxycodone/Acetaminophen 325MG/5MG 1980 30 10 MACARIO MENESES [place] [facility] [place] 23 1      Reason for call:     Ana Laura called and said husbands pain has worsened.  He is requesting a refill on his pain medication.

## 2021-09-02 RX ORDER — OXYCODONE HYDROCHLORIDE AND ACETAMINOPHEN 5; 325 MG/1; MG/1
1 TABLET ORAL EVERY 8 HOURS PRN
Qty: 25 TABLET | Refills: 0 | Status: SHIPPED | OUTPATIENT
Start: 2021-09-02 | End: 2023-03-01

## 2021-09-10 ENCOUNTER — HOSPITAL ENCOUNTER (OUTPATIENT)
Dept: MRI IMAGING | Facility: HOSPITAL | Age: 41
Discharge: HOME | End: 2021-09-10
Admitting: PHYSICIAN ASSISTANT

## 2021-09-10 DIAGNOSIS — T81.49XA WOUND INFECTION AFTER SURGERY: ICD-10-CM

## 2021-09-10 DIAGNOSIS — L08.9 WOUND INFECTION: ICD-10-CM

## 2021-09-10 DIAGNOSIS — M51.36 DDD (DEGENERATIVE DISC DISEASE), LUMBAR: ICD-10-CM

## 2021-09-10 DIAGNOSIS — M54.42 CHRONIC BILATERAL LOW BACK PAIN WITH BILATERAL SCIATICA: ICD-10-CM

## 2021-09-10 DIAGNOSIS — M54.41 CHRONIC BILATERAL LOW BACK PAIN WITH BILATERAL SCIATICA: ICD-10-CM

## 2021-09-10 DIAGNOSIS — Z98.890 S/P LUMBAR DISCECTOMY: ICD-10-CM

## 2021-09-10 DIAGNOSIS — T14.8XXA WOUND INFECTION: ICD-10-CM

## 2021-09-10 DIAGNOSIS — G89.29 CHRONIC BILATERAL LOW BACK PAIN WITH BILATERAL SCIATICA: ICD-10-CM

## 2021-09-10 PROCEDURE — A9577 INJ MULTIHANCE: HCPCS | Performed by: PHYSICIAN ASSISTANT

## 2021-09-10 PROCEDURE — 72158 MRI LUMBAR SPINE W/O & W/DYE: CPT

## 2021-09-10 PROCEDURE — 0 GADOBENATE DIMEGLUMINE 529 MG/ML SOLUTION: Performed by: PHYSICIAN ASSISTANT

## 2021-09-10 PROCEDURE — 72158 MRI LUMBAR SPINE W/O & W/DYE: CPT | Performed by: RADIOLOGY

## 2021-09-10 RX ADMIN — GADOBENATE DIMEGLUMINE 20 ML: 529 INJECTION, SOLUTION INTRAVENOUS at 09:10

## 2021-09-16 ENCOUNTER — OFFICE VISIT (OUTPATIENT)
Dept: NEUROSURGERY | Facility: CLINIC | Age: 41
End: 2021-09-16

## 2021-09-16 VITALS
HEIGHT: 73 IN | SYSTOLIC BLOOD PRESSURE: 157 MMHG | RESPIRATION RATE: 18 BRPM | TEMPERATURE: 97.4 F | OXYGEN SATURATION: 99 % | WEIGHT: 252 LBS | BODY MASS INDEX: 33.4 KG/M2 | HEART RATE: 87 BPM | DIASTOLIC BLOOD PRESSURE: 96 MMHG

## 2021-09-16 DIAGNOSIS — T14.8XXA WOUND INFECTION: ICD-10-CM

## 2021-09-16 DIAGNOSIS — L08.9 WOUND INFECTION: ICD-10-CM

## 2021-09-16 DIAGNOSIS — M51.16 LUMBAR DISC HERNIATION WITH RADICULOPATHY: ICD-10-CM

## 2021-09-16 DIAGNOSIS — M51.36 DDD (DEGENERATIVE DISC DISEASE), LUMBAR: Primary | ICD-10-CM

## 2021-09-16 PROCEDURE — 99024 POSTOP FOLLOW-UP VISIT: CPT | Performed by: PHYSICIAN ASSISTANT

## 2021-09-16 RX ORDER — HYDROXYZINE HYDROCHLORIDE 25 MG/1
TABLET, FILM COATED ORAL
COMMUNITY
Start: 2021-08-28 | End: 2023-03-06 | Stop reason: SDUPTHER

## 2021-09-16 RX ORDER — CEPHALEXIN 500 MG/1
CAPSULE ORAL
COMMUNITY
Start: 2021-08-25 | End: 2023-03-01

## 2021-09-16 RX ORDER — GABAPENTIN 800 MG/1
800 TABLET ORAL 3 TIMES DAILY
Qty: 90 TABLET | Refills: 1 | Status: SHIPPED | OUTPATIENT
Start: 2021-09-16 | End: 2021-11-12 | Stop reason: SDUPTHER

## 2021-09-16 NOTE — PROGRESS NOTES
Wenceslao Nava  1980 09/16/2021  [id number]    CC: Back, left hip, bilateral thigh pain    HPI:  S/P discectomy L3-4, right on 6/24/2021.  The patient unfortunately developed a wound infection after surgery and underwent a washout  On 8/9/2021.  He completed IV antibiotics and currently on oral antibiotics, he is here for surgical follow-up and review of new MRI of the lumbar spine.    Past Medical History:   Diagnosis Date   • Arthritis    • Diabetes mellitus (CMS/HCC)     po meds only    • Elevated cholesterol    • Hypertension    • Knee pain, left    • Wound infection after surgery 07/2021       No Known Allergies      Current Outpatient Medications:   •  aspirin (aspirin) 81 MG EC tablet, Take 1 tablet by mouth Daily. (Patient taking differently: Take 81 mg by mouth Daily. Will stop for surgery), Disp: 30 tablet, Rfl: 0  •  atorvastatin (LIPITOR) 20 MG tablet, Take 20 mg by mouth Daily., Disp: , Rfl:   •  cyclobenzaprine (FLEXERIL) 10 MG tablet, TAKE ONE TABLET BY MOUTH THREE TIMES A DAY AS NEEDED FOR MUSCULAR SPASMS (MAY CAUSE DROWSINESS), Disp: 60 tablet, Rfl: 0  •  DAPTOmycin (CUBICIN) 500 MG injection, , Disp: , Rfl:   •  diphenhydrAMINE (BENADRYL) 50 MG capsule, , Disp: , Rfl:   •  docusate sodium (Colace) 100 MG capsule, Take 1 capsule by mouth 3 (Three) Times a Day As Needed for Constipation., Disp: 30 capsule, Rfl: 0  •  doxycycline (MONODOX) 100 MG capsule, TAKE ONE CAPSULE BY MOUTH TWO TIMES A DAY (TAKE WITH FOOD), Disp: 20 capsule, Rfl: 0  •  Dulaglutide (Trulicity) 0.75 MG/0.5ML solution pen-injector, Inject 0.75 mg under the skin into the appropriate area as directed 1 (One) Time Per Week. fridays, Disp: , Rfl:   •  EPINEPHrine (EPIPEN JR) 0.15 MG/0.3ML solution auto-injector injection, , Disp: , Rfl:   •  FLUoxetine (PROzac) 40 MG capsule, Take 40 mg by mouth Daily., Disp: , Rfl:   •  gabapentin (NEURONTIN) 400 MG capsule, Take 400 mg by mouth 3 (Three) Times a Day., Disp: , Rfl:   •   "lisinopril-hydrochlorothiazide (PRINZIDE,ZESTORETIC) 20-12.5 MG per tablet, , Disp: , Rfl:   •  metFORMIN ER (GLUCOPHAGE-XR) 750 MG 24 hr tablet, Take 750 mg by mouth 2 (two) times a day., Disp: , Rfl:   •  oxyCODONE-acetaminophen (PERCOCET) 5-325 MG per tablet, Take 1 tablet by mouth Every 8 (Eight) Hours As Needed for Moderate Pain ., Disp: 25 tablet, Rfl: 0  •  cephalexin (KEFLEX) 500 MG capsule, , Disp: , Rfl:   •  hydrOXYzine (ATARAX) 25 MG tablet, , Disp: , Rfl:   •  nabumetone (RELAFEN) 500 MG tablet, Take 500 mg by mouth 2 (Two) Times a Day As Needed for Mild Pain ., Disp: , Rfl:   •  sodium chloride 0.9 % solution, , Disp: , Rfl:   •  Sodium Chloride Flush (sodium chloride 0.9 % flush) 0.9 % solution, , Disp: , Rfl:   •  tiZANidine (Zanaflex) 4 MG tablet, Take 1 tablet by mouth Every 8 (Eight) Hours As Needed for Muscle Spasms., Disp: 30 tablet, Rfl: 1    Review of Systems   Constitutional: Negative for fever.   Cardiovascular: Negative for chest pain.   Gastrointestinal: Positive for rectal pain. Negative for abdominal pain.   Genitourinary: Negative for dysuria.   Musculoskeletal: Positive for arthralgias, back pain, myalgias, neck pain and neck stiffness.   Neurological: Positive for dizziness, tremors, weakness and numbness. Negative for headaches.   All other systems reviewed and are negative.        PE:  /96 (BP Location: Right arm, Patient Position: Sitting, Cuff Size: Large Adult)   Pulse 87   Temp 97.4 °F (36.3 °C) (Infrared)   Resp 18   Ht 185.4 cm (73\")   Wt 114 kg (252 lb)   SpO2 99%   BMI 33.25 kg/m²   Heart- RRR  Lungs- no wheezing, normal expansion    Wound-healed well and flat.    Neurologic Exam   His gait is steady, he does have more back pain going from a sitting to standing position.  Reflexes are intact in the lower extremities.  Straight leg raising is positive.    MDM   1.  Degenerative disc disease L3-4  2.  Status post discectomy L3-4, right, postop wound " infection.  Has completed IV antibiotics and currently on p.o. antibiotics.  3.  Ongoing back, worse left-sided, bilateral hip bilateral thigh pain  4.  Increase Neurontin to 800 mg twice daily and then 3 times daily as tolerated.  He will wean his Percocet down.  His pain clinic has provided him with Norco 7.5.  I would plan on a telemedicine visit in 1 month.  Activities and restrictions were discussed.      Danita Peña, PAC    Answers for HPI/ROS submitted by the patient on 9/14/2021  What is the primary reason for your visit?: Back Pain  Chronicity: chronic  Onset: more than 1 month ago  Frequency: constantly  Progression since onset: rapidly worsening  Pain location: gluteal, sacro-iliac  Pain quality: aching, burning, cramping, shooting, stabbing  Radiates to: left foot, left knee, left thigh, right foot, right knee, right thigh  Pain - numeric: 10/10  Pain is: worse during the night  Aggravated by: bending, position, sitting, standing, twisting  Stiffness is present: all day  bladder incontinence: No  bowel incontinence: No  leg pain: Yes  paresis: No  paresthesias: No  pelvic pain: Yes  perianal numbness: No  tingling: Yes  weight loss: Yes  Risk factors: lack of exercise, obesity, sedentary lifestyle

## 2021-10-11 ENCOUNTER — OFFICE VISIT (OUTPATIENT)
Dept: NEUROSURGERY | Facility: CLINIC | Age: 41
End: 2021-10-11

## 2021-10-11 VITALS — HEIGHT: 72 IN | BODY MASS INDEX: 34.13 KG/M2 | WEIGHT: 252 LBS

## 2021-10-11 DIAGNOSIS — M54.42 CHRONIC BILATERAL LOW BACK PAIN WITH BILATERAL SCIATICA: Primary | ICD-10-CM

## 2021-10-11 DIAGNOSIS — M54.41 CHRONIC BILATERAL LOW BACK PAIN WITH BILATERAL SCIATICA: Primary | ICD-10-CM

## 2021-10-11 DIAGNOSIS — G89.29 CHRONIC BILATERAL LOW BACK PAIN WITH BILATERAL SCIATICA: Primary | ICD-10-CM

## 2021-10-11 PROCEDURE — 99442 PR PHYS/QHP TELEPHONE EVALUATION 11-20 MIN: CPT | Performed by: PHYSICIAN ASSISTANT

## 2021-10-11 NOTE — PROGRESS NOTES
Wenceslao LITTLE Elijah   1980   [id number]     10/11/2021     HPI:  Telemedicine: KEITH Alanis  Location of Provider: Office  Type of Service: consult via telemedicine  Any physical exam was assisted by the patient.  All communications with the patient (verbal, audiovisual and written) were documented in the patient's medical record per documentation standards.  Mode of transmission: Telemedicine via 2-way interactive A/V telecommunication.  Basis for telemedicine: COVID-19    You have chosen to receive care through a telephone visit. Do you consent to use a telephone visit for your medical care today? yes    CC:bilateral buttock and left leg pain    HPI:  Patient is a 41-year-old status post discectomy at L3-4 on the right on 6/24/2021 by Dr. Sorto.  He unfortunately developed wound drainage and underwent a wound washout on 8/9/2021.  He has already completed IV eye antibiotics and currently on p.o. antibiotics.    Past medical history, allergies, medications, surgical history, social history, family history reviewed and updated.    Social History     Tobacco Use   Smoking Status Never Smoker   Smokeless Tobacco Current User   • Types: Snuff        Body mass index is 34.18 kg/m².       Physical examination:  The patient sounds well on the phone, no cough, SOB or wheezing is noted.    Assessment/Plan:  Patient will continue his treatment, f/u with ID. I will get last visit and labs.   Will have telemed f/u with results.    This was an audio and video enabled telemedicine encounter. This visit has been rescheduled as a phone visit to comply with patient safety concerns in accordance with CDC recommendations.     Total time of discussion was 20 minutes.       KEITH Knox Sarah Beth, PA

## 2021-10-15 ENCOUNTER — TELEPHONE (OUTPATIENT)
Dept: NEUROSURGERY | Facility: CLINIC | Age: 41
End: 2021-10-15

## 2021-10-15 DIAGNOSIS — M54.41 CHRONIC BILATERAL LOW BACK PAIN WITH BILATERAL SCIATICA: ICD-10-CM

## 2021-10-15 DIAGNOSIS — M54.42 CHRONIC BILATERAL LOW BACK PAIN WITH BILATERAL SCIATICA: ICD-10-CM

## 2021-10-15 DIAGNOSIS — L08.9 WOUND INFECTION: Primary | ICD-10-CM

## 2021-10-15 DIAGNOSIS — G89.29 CHRONIC BILATERAL LOW BACK PAIN WITH BILATERAL SCIATICA: ICD-10-CM

## 2021-10-15 DIAGNOSIS — M51.36 DDD (DEGENERATIVE DISC DISEASE), LUMBAR: ICD-10-CM

## 2021-10-15 DIAGNOSIS — T14.8XXA WOUND INFECTION: Primary | ICD-10-CM

## 2021-10-15 NOTE — TELEPHONE ENCOUNTER
Spoke with Wenceslao.  He is having bilateral low back pain into the buttocks and down the leg to the foot.  I received the last set of labs from infectious disease dated 8/23/2021 his C-reactive protein is 2.01 and his sed rate is 59.  RBC 7.66.  I told him I was going to order new labs that he can have done in [place] and will take a look at those results and give him a call back next week.    Fabby Peña PA-C

## 2021-10-15 NOTE — TELEPHONE ENCOUNTER
Provider:  Danita PAULSON  Caller: patient  Time of call:   9:58  Phone #:  [phone]  Surgery:  I & D after lumbar discectomy  Surgery Date:  08/09/21  Last visit:  10/11/21   Next visit:     CINDY:         Reason for call:     Patient called and said his left buttock and leg pain has increased tremendously since his visit with Khadijah PAULSON on 10/11/21.  Patient states he is in extreme pain and would like to speak with someone.    Patient takes Gabapentin 800 mg tid.

## 2021-10-18 ENCOUNTER — LAB (OUTPATIENT)
Dept: LAB | Facility: HOSPITAL | Age: 41
End: 2021-10-18

## 2021-10-18 DIAGNOSIS — G89.29 CHRONIC BILATERAL LOW BACK PAIN WITH BILATERAL SCIATICA: ICD-10-CM

## 2021-10-18 DIAGNOSIS — T14.8XXA WOUND INFECTION: ICD-10-CM

## 2021-10-18 DIAGNOSIS — M54.41 CHRONIC BILATERAL LOW BACK PAIN WITH BILATERAL SCIATICA: ICD-10-CM

## 2021-10-18 DIAGNOSIS — M51.36 DDD (DEGENERATIVE DISC DISEASE), LUMBAR: ICD-10-CM

## 2021-10-18 DIAGNOSIS — M54.42 CHRONIC BILATERAL LOW BACK PAIN WITH BILATERAL SCIATICA: ICD-10-CM

## 2021-10-18 DIAGNOSIS — L08.9 WOUND INFECTION: ICD-10-CM

## 2021-10-18 LAB
CRP SERPL-MCNC: 0.62 MG/DL (ref 0–0.5)
ERYTHROCYTE [SEDIMENTATION RATE] IN BLOOD: 8 MM/HR (ref 0–15)

## 2021-10-18 PROCEDURE — 36415 COLL VENOUS BLD VENIPUNCTURE: CPT

## 2021-10-18 PROCEDURE — 85652 RBC SED RATE AUTOMATED: CPT

## 2021-10-18 PROCEDURE — 86140 C-REACTIVE PROTEIN: CPT

## 2021-11-12 DIAGNOSIS — M51.36 DDD (DEGENERATIVE DISC DISEASE), LUMBAR: ICD-10-CM

## 2021-11-12 DIAGNOSIS — T14.8XXA WOUND INFECTION: ICD-10-CM

## 2021-11-12 DIAGNOSIS — M51.16 LUMBAR DISC HERNIATION WITH RADICULOPATHY: ICD-10-CM

## 2021-11-12 DIAGNOSIS — L08.9 WOUND INFECTION: ICD-10-CM

## 2021-11-12 NOTE — TELEPHONE ENCOUNTER
Provider:  Wes  Caller: patient  Time of call:  3:23   Phone #:  [phone]  Surgery:  Incision and Drainage Wound  Surgery Date:  8-9-21  Last visit:   10-11-21NA  Next visit:     CINDY:    08/18/2021 Gabapentin 400MG 1980 90 30 [facility] [place] [facility]  08/18/2021 Oxycontin 15MG 1980 14 7 DESMOND GIDEON [facility]  Retail Pharmacy  [facility] 45 2  09/02/2021 Oxycodone/Acetaminophen 325MG/5MG 1980 25 8 DESMOND MENESES [place] [place] [facility] [place] 23 1  09/14/2021 Hydrocodone/Acetaminophen  325MG/7.5MG  1980 90 30 [facility] [place] 23 1  09/16/2021 Gabapentin 800MG 1980 90 30 [facility] [place] 1   10/13/2021 Gabapentin 800MG 1980 90 30 [facility] [place] 1  10/13/2021 Hydrocodone/Acetaminophen  325MG/7.5MG  1980 90 30 [facility] [place] 23 1      Reason for call:    Patient called and requested a refill for his Gabapentin. Please Advise. Thank you.      Requested Prescriptions     Pending Prescriptions Disp Refills   • gabapentin (NEURONTIN) 800 MG tablet 90 tablet 1     Sig: Take 1 tablet by mouth 3 (Three) Times a Day.

## 2021-11-16 RX ORDER — GABAPENTIN 800 MG/1
800 TABLET ORAL 3 TIMES DAILY
Qty: 90 TABLET | Refills: 1 | Status: SHIPPED | OUTPATIENT
Start: 2021-11-16 | End: 2022-01-24 | Stop reason: SDUPTHER

## 2021-11-17 ENCOUNTER — TELEPHONE (OUTPATIENT)
Dept: NEUROSURGERY | Facility: CLINIC | Age: 41
End: 2021-11-17

## 2021-11-22 ENCOUNTER — TELEPHONE (OUTPATIENT)
Dept: NEUROSURGERY | Facility: CLINIC | Age: 41
End: 2021-11-22

## 2021-12-01 NOTE — TELEPHONE ENCOUNTER
I spoke to patient and let him know that Flores talked to the surgeon and recommends more time. If symptoms worsen we can offer Tramadol and Ibuprofen. He was thankful for the call back.

## 2021-12-01 NOTE — TELEPHONE ENCOUNTER
I reviewed everything with Dr. Sorto   - he needs more time and if he is requiring pain medication we can offer tramadol and ibuprofen.     Otherwise medication will need to come from PM or PCP

## 2021-12-01 NOTE — TELEPHONE ENCOUNTER
Flores already spoke with Dr. Sorto. -He needs more timing and if he is requiring pain medication we can offer Tramadol and IBU. Otherwise meds will need to come from PCP

## 2021-12-01 NOTE — TELEPHONE ENCOUNTER
I spoke to patient and relayed the PA's message. He said from the time he sent the message he is doing better.    Patient states that the last phone visit he had with Casey, she mentioned she was going to talk to the surgeon about his back. He is wondering if she had a chance to talk to them about this? I let patient know that Danita is in surgery today and it might be tomorrow before we get back with him. He was understanding of this.     I asked patient how his back is doing.     He said he is still have buttock pain and bilateral leg pain.     Patient states that he will call our office if he feels like he needs to have an rx of Tramadol.     He was thankful for the call.

## 2022-01-24 DIAGNOSIS — L08.9 WOUND INFECTION: ICD-10-CM

## 2022-01-24 DIAGNOSIS — M51.16 LUMBAR DISC HERNIATION WITH RADICULOPATHY: ICD-10-CM

## 2022-01-24 DIAGNOSIS — M51.36 DDD (DEGENERATIVE DISC DISEASE), LUMBAR: ICD-10-CM

## 2022-01-24 DIAGNOSIS — T14.8XXA WOUND INFECTION: ICD-10-CM

## 2022-01-24 RX ORDER — GABAPENTIN 800 MG/1
800 TABLET ORAL 3 TIMES DAILY
Qty: 90 TABLET | Refills: 1 | Status: SHIPPED | OUTPATIENT
Start: 2022-01-24 | End: 2022-01-25

## 2022-01-24 NOTE — TELEPHONE ENCOUNTER
Provider:Macario    Caller: patient  Time of call:   9:30  Phone #: [phone]   Surgery:  I & D  Surgery Date:  08/09/21  Last visit:   10/11/21  Next visit:     CINDY:10/13/2021 Hydrocodone/Acetaminophen  325MG/7.5MG  1980 90 30 [facility]  [facility]  [place] 23 1  11/11/2021 Hydrocodone/Acetaminophen  325MG/7.5MG  1980 90 30 [facility]  [facility]  [place] 23 1  11/17/2021 Gabapentin 800MG 1980 90 30 Fabby Peña DIS[facility]  DRUG  [place] 1  12/11/2021 Hydrocodone/Acetaminophen  325MG/7.5MG  1980 90 30 [facility]  DRUG  [place] 23 1  12/22/2021 Gabapentin 800MG 1980 90 30 Fabby Peña DISCOUNT  DRUG  [place] 1  01/11/2022 Hydrocodone/Acetaminophen  325MG/7.5MG  1980 90 30 [facility]  [place] 23 1         Reason for call:     Patient requests refill on Gabapentin.

## 2022-01-25 RX ORDER — GABAPENTIN 800 MG/1
800 TABLET ORAL 3 TIMES DAILY
Qty: 90 TABLET | Refills: 1 | Status: SHIPPED | OUTPATIENT
Start: 2022-01-25 | End: 2022-05-16 | Stop reason: SDUPTHER

## 2022-01-25 NOTE — TELEPHONE ENCOUNTER
E-Prescribing Status: Transmission to pharmacy failed (1/24/2022  9:48 AM EST)      Will re-pend medication.

## 2022-02-25 ENCOUNTER — OFFICE VISIT (OUTPATIENT)
Dept: PULMONOLOGY | Facility: CLINIC | Age: 42
End: 2022-02-25

## 2022-02-25 VITALS
SYSTOLIC BLOOD PRESSURE: 158 MMHG | TEMPERATURE: 96.9 F | OXYGEN SATURATION: 98 % | WEIGHT: 254 LBS | HEART RATE: 75 BPM | HEIGHT: 72 IN | BODY MASS INDEX: 34.4 KG/M2 | DIASTOLIC BLOOD PRESSURE: 98 MMHG

## 2022-02-25 DIAGNOSIS — E66.9 OBESITY (BMI 30-39.9): ICD-10-CM

## 2022-02-25 DIAGNOSIS — G47.33 OSA (OBSTRUCTIVE SLEEP APNEA): Primary | ICD-10-CM

## 2022-02-25 PROCEDURE — 99202 OFFICE O/P NEW SF 15 MIN: CPT | Performed by: NURSE PRACTITIONER

## 2022-02-25 NOTE — PROGRESS NOTES
"Chief Complaint  Sleep Apnea    Subjective          Wenceslao Nava presents to [facility] P[facility] CRITICAL CARE MEDICINE  History of Present Illness    Mr. Nava is a 41 year old male with a medical history significant for diabetes, hyperlipidemia, and hypertension.    He presents today for evaluation of sleep apnea.  He states that he feels unrested after waking up in the morning and experiences daytime fatigue.  He reports that he snores at nighttime.  He also tells me that he often wakes up throughout the night gasping for air or choking.        Objective   Vital Signs:   /98   Pulse 75   Temp 96.9 °F (36.1 °C) (Temporal)   Ht 182.9 cm (72\")   Wt 115 kg (254 lb)   SpO2 98%   BMI 34.45 kg/m²     Physical Exam     GENERAL APPEARANCE: Well developed, well nourished, alert and cooperative, and appears to be in no acute distress.    HEAD: normocephalic. Atraumatic.    EYES: PERRL, EOMI. Vision is grossly intact.    THROAT: Oral cavity and pharynx normal. No inflammation, swelling, exudate, or lesions.     NECK: Neck supple.  No thyromegaly.    CARDIAC: Normal S1 and S2. No S3, S4 or murmurs. Rhythm is regular.     RESPIRATORY:Bilateral air entry positive. Bilateral diminished breath sounds. No wheezing, crackles or rhonchi noted.    GI: Positive bowel sounds. Soft, nondistended, nontender.     MUSCULOSKELETAL: No significant deformity or joint abnormality. No edema. Peripheral pulses intact. No varicosities.    NEUROLOGICAL: Strength and sensation symmetric and intact throughout.     PSYCHIATRIC: The mental examination revealed the patient was oriented to person, place, and time.     Result Review :   The following data was reviewed by: MAGED Lewis on 02/25/2022:  Common labs    Common Labsle 8/10/21 8/16/21 8/16/21 8/23/21 8/23/21     1112 1112 1304 1304   Glucose 119 (A)  98  66   BUN 13  16  14   Creatinine 0.91  0.92  0.93   eGFR Non  Am 92  91  90 "   Sodium 138  135 (A)  140   Potassium 3.8  3.9  3.5   Chloride 103  99  102   Calcium 9.2  9.1  9.1   Albumin   3.89  4.07   Total Bilirubin   0.3  0.2   Alkaline Phosphatase   113  110   AST (SGOT)   18  16   ALT (SGPT)   27  24   WBC  6.98  7.66    Hemoglobin  11.7 (A)  10.8 (A)    Hematocrit  34.6 (A)  32.7 (A)    Platelets  378  339    (A) Abnormal value       Comments are available for some flowsheets but are not being displayed.                    Assessment and Plan    Diagnoses and all orders for this visit:    1. RYANNE (obstructive sleep apnea) (Primary)  -     Home Sleep Study; Future    2. Obesity (BMI 30-39.9)          RYANNE:  We will order home sleep study to evaluate for sleep apnea.  Patient's Body mass index is 34.45 kg/m². indicating that he is obese (BMI >30). Obesity-related health conditions include the following: hypertension, diabetes mellitus and dyslipidemias. Obesity is unchanged. BMI is is above average; BMI management plan is completed. We discussed portion control and increasing exercise..   Patient was educated on positive airway pressure treatment.  As per CMS guidelines, more than 4 hours on 70% of observed nights is considered adherence. Patient was strongly encouraged to use CPAP as much as possible during sleep as more CPAP use is equal to more benefit. Use of heated humidification in positive airway pressure treatment to improve the adherence to the device.  In case of claustrophobia, we will provide the patient cognitive behavioral therapy and desensitization. Oral appliances use will be discussed with the patient in case of mild to moderate sleep apnea or if the patient with severe disease fail positive airway pressure treatment.       The patient was extensively educated on the consequences of untreated obstructive sleep apnea namely cardiovascular/metabolic disorder, neurocognitive deficit, daytime sleepiness, motor vehicle accidents, depression, mood disorders and reduced quality  of life.  At the end of conversation, the patient voices understanding of the disease process and treatment modality.  Patient also understands the risk of untreated obstructive sleep apnea and benefit benefits of the treatment.    Counseling time was greater than 10 minutes.        Follow Up   Return in about 3 months (around 5/25/2022).  Patient was given instructions and counseling regarding his condition or for health maintenance advice. Please see specific information pulled into the AVS if appropriate.

## 2022-03-15 ENCOUNTER — TELEPHONE (OUTPATIENT)
Dept: PULMONOLOGY | Facility: CLINIC | Age: 42
End: 2022-03-15

## 2022-03-15 DIAGNOSIS — G47.33 OSA (OBSTRUCTIVE SLEEP APNEA): Primary | ICD-10-CM

## 2022-05-16 DIAGNOSIS — M51.16 LUMBAR DISC HERNIATION WITH RADICULOPATHY: ICD-10-CM

## 2022-05-16 DIAGNOSIS — L08.9 WOUND INFECTION: ICD-10-CM

## 2022-05-16 DIAGNOSIS — M51.36 DDD (DEGENERATIVE DISC DISEASE), LUMBAR: ICD-10-CM

## 2022-05-16 DIAGNOSIS — T14.8XXA WOUND INFECTION: ICD-10-CM

## 2022-05-16 RX ORDER — GABAPENTIN 800 MG/1
800 TABLET ORAL 3 TIMES DAILY
Qty: 90 TABLET | Refills: 1 | Status: SHIPPED | OUTPATIENT
Start: 2022-05-16 | End: 2023-03-01

## 2022-05-16 NOTE — TELEPHONE ENCOUNTER
Provider: Casey   Caller: patient  Time of call:  9:05   Phone #:  [phone]  Surgery:  Incision and drainage wound  Surgery Date:  8-9-221  Last visit:   10-11-21  Next visit: na    Reason For Call: Patient is requesting a refill for Gabapentin. Please Advise. Thank you.    CINDY:   01/28/2022 Gabapentin 800MG 1980 90 30 [facility]  DRUG  [place] 1  02/10/2022 Hydrocodone/Acetaminophen  325MG/7.5MG  1980 90 30 [facility] DIS[facility]  DRUG  [place] 23 1  03/07/2022 Gabapentin 800MG 1980 90 30 Flores [facility] DIS[facility]  DRUG  [place] 1  03/14/2022 Hydrocodone/Acetaminophen  325MG/7.5MG  1980 90 30 [facility] DIS[facility]  DRUG  [place] 23 1  04/14/2022 Hydrocodone/Acetaminophen  325MG/7.5MG  1980 90 30 [facility] DIS[facility]  DRUG  [place] 23 1          Requested Prescriptions     Pending Prescriptions Disp Refills   • gabapentin (NEURONTIN) 800 MG tablet 90 tablet 1     Sig: Take 1 tablet by mouth 3 (Three) Times a Day.

## 2022-08-15 DIAGNOSIS — L08.9 WOUND INFECTION: ICD-10-CM

## 2022-08-15 DIAGNOSIS — T14.8XXA WOUND INFECTION: ICD-10-CM

## 2022-08-15 DIAGNOSIS — M51.16 LUMBAR DISC HERNIATION WITH RADICULOPATHY: ICD-10-CM

## 2022-08-15 DIAGNOSIS — M51.36 DDD (DEGENERATIVE DISC DISEASE), LUMBAR: ICD-10-CM

## 2022-08-15 RX ORDER — GABAPENTIN 800 MG/1
800 TABLET ORAL 3 TIMES DAILY
Qty: 90 TABLET | Refills: 1 | OUTPATIENT
Start: 2022-08-15

## 2022-08-15 NOTE — TELEPHONE ENCOUNTER
Provider:  Casey  Surgery/Procedure:  INCISION AND DRAINAGE WOUND  Surgery/Procedure Date: 8-9-21  LAMINOTOMY, FORAMINOTOMY, DISCECTOMY, L3-4 RIGHT  6-24-21  Last visit:   10-11-21  Next visit: TBD     Reason for call: Patient called and wanted to know if he could have a refill for his Gabapentin. He also wants to get an appointment to see Fabby again because her is having pain in his leg and left arm. He would like to see her in [place] but if not her will come to [place].  Patient is not had a fever or having B&B problems. Please Advise. Thank you.     When did it start:2 weeks ago    Where is it located:left leg and left arm    How long has this been going on/is this new or the same as before surgery:  2 weeks leg is the same    Characteristics of symptom/severity:sharp pain numbness in the left leg    Timing- Is it constant or intermittent: intermittent     What makes it worse:nothing    What makes it better:nothing    What therapies/medications have you tried:  Gabapentin 800 MG 3 times a day  Norco 7.5 /325 3 times a day    CINDY:06/15/2022 Hydrocodone Bitartrate/Ac  325MG/7.5MG  1980 90 30 [facility]  [place] 23 1  07/05/2022 Gabapentin 800MG 1980 90 30 [facility]  [place]  07/15/2022 Hydrocodone Bitartrate/Ac  325MG/7.5MG  1980 90 30 [facility]  DRUG  [place] 23 1    Requested Prescriptions     Pending Prescriptions Disp Refills   • gabapentin (NEURONTIN) 800 MG tablet 90 tablet 1     Sig: Take 1 tablet by mouth 3 (Three) Times a Day.

## 2022-08-18 NOTE — TELEPHONE ENCOUNTER
Pt called into clinical line requesting Иван refill, notified him PCP, he is currently there and would see about that. In the  Meantime he is requesting the appt with Danita. Can we get this scheduled

## 2022-08-25 ENCOUNTER — OFFICE VISIT (OUTPATIENT)
Dept: PULMONOLOGY | Facility: CLINIC | Age: 42
End: 2022-08-25

## 2022-08-25 VITALS
WEIGHT: 265 LBS | BODY MASS INDEX: 35.89 KG/M2 | OXYGEN SATURATION: 98 % | SYSTOLIC BLOOD PRESSURE: 142 MMHG | DIASTOLIC BLOOD PRESSURE: 88 MMHG | HEIGHT: 72 IN | TEMPERATURE: 97.3 F | HEART RATE: 72 BPM

## 2022-08-25 DIAGNOSIS — G47.33 OSA (OBSTRUCTIVE SLEEP APNEA): Primary | ICD-10-CM

## 2022-08-25 DIAGNOSIS — E66.01 SEVERE OBESITY (BMI 35.0-35.9 WITH COMORBIDITY): ICD-10-CM

## 2022-08-25 PROBLEM — M54.50 LOW BACK PAIN: Status: ACTIVE | Noted: 2019-11-15

## 2022-08-25 PROBLEM — K80.20 CALCULUS OF GALLBLADDER: Status: ACTIVE | Noted: 2022-08-25

## 2022-08-25 PROBLEM — M54.2 NECK PAIN: Status: ACTIVE | Noted: 2019-11-15

## 2022-08-25 PROCEDURE — 99214 OFFICE O/P EST MOD 30 MIN: CPT | Performed by: NURSE PRACTITIONER

## 2022-08-25 RX ORDER — HYDROCODONE BITARTRATE AND ACETAMINOPHEN 7.5; 325 MG/1; MG/1
TABLET ORAL
COMMUNITY
Start: 2022-08-16

## 2022-08-25 RX ORDER — LOSARTAN POTASSIUM AND HYDROCHLOROTHIAZIDE 25; 100 MG/1; MG/1
TABLET ORAL
COMMUNITY

## 2022-08-25 RX ORDER — EXENATIDE 2 MG/.85ML
INJECTION, SUSPENSION, EXTENDED RELEASE SUBCUTANEOUS
COMMUNITY
Start: 2022-07-05 | End: 2023-03-01

## 2022-08-25 NOTE — PROGRESS NOTES
"Chief Complaint  Sleep Apnea    Subjective        Wenceslao Nava presents to [facility] PULMONARY & CRITICAL CARE MEDICINE  History of Present Illness     Mr. Nava is a 42 year old male with a medical history significant for diabetes, hyperlipidemia, insomnia, RYANNE and hypertension.    He presents today for follow up on sleep apnea.  He states that he is using his cpap nightly.  He reports that he feels that he is not getting enough pressure.  He is using hydroxyzine nightly to sleep.      Objective   Vital Signs:  /88 (BP Location: Right arm, Patient Position: Sitting, Cuff Size: Adult)   Pulse 72   Temp 97.3 °F (36.3 °C) (Infrared)   Ht 182.9 cm (72\")   Wt 120 kg (265 lb)   SpO2 98%   BMI 35.94 kg/m²   Estimated body mass index is 35.94 kg/m² as calculated from the following:    Height as of this encounter: 182.9 cm (72\").    Weight as of this encounter: 120 kg (265 lb).    Class 2 Severe Obesity (BMI >=35 and <=39.9). Obesity-related health conditions include the following: obstructive sleep apnea, hypertension, diabetes mellitus and dyslipidemias. Obesity is unchanged. BMI is is above average; BMI management plan is completed. We discussed portion control and increasing exercise.      Physical Exam     GENERAL APPEARANCE: Well developed, well nourished, alert and cooperative, and appears to be in no acute distress.    HEAD: normocephalic. Atraumatic.    EYES: PERRL, EOMI. Vision is grossly intact.    THROAT: Oral cavity and pharynx normal. No inflammation, swelling, exudate, or lesions.     NECK: Neck supple.  No thyromegaly.    CARDIAC: Normal S1 and S2. No S3, S4 or murmurs. Rhythm is regular.     RESPIRATORY:Bilateral air entry positive. Bilateral diminished breath sounds. No wheezing, crackles or rhonchi noted.    GI: Positive bowel sounds. Soft, nondistended, nontender.     MUSCULOSKELETAL: No significant deformity or joint abnormality. No edema. Peripheral pulses " intact. No varicosities.    NEUROLOGICAL: Strength and sensation symmetric and intact throughout.     PSYCHIATRIC: The mental examination revealed the patient was oriented to person, place, and time.     Result Review :  The following data was reviewed by: MAGED Lewis on 08/25/2022:             Assessment and Plan   Diagnoses and all orders for this visit:    1. RYANNE (obstructive sleep apnea) (Primary)  -     Miscellaneous DME    2. Severe obesity (BMI 35.0-35.9 with comorbidity) (HCC)           Complaint with use of cpap nightly. He requests that his pressure be increased, he feels that he is not getting enough.  Continue use of hydroxyzine for insomnia.   Patient was educated on positive airway pressure treatment.  As per CMS guidelines, more than 4 hours on 70% of observed nights is considered adherence. Patient was strongly encouraged to use CPAP as much as possible during sleep as more CPAP use is equal to more benefit. Use of heated humidification in positive airway pressure treatment to improve the adherence to the device.  In case of claustrophobia, we will provide the patient cognitive behavioral therapy and desensitization. Oral appliances use will be discussed with the patient in case of mild to moderate sleep apnea or if the patient with severe disease fail positive airway pressure treatment.       The patient was extensively educated on the consequences of untreated obstructive sleep apnea namely cardiovascular/metabolic disorder, neurocognitive deficit, daytime sleepiness, motor vehicle accidents, depression, mood disorders and reduced quality of life.  At the end of conversation, the patient voices understanding of the disease process and treatment modality.  Patient also understands the risk of untreated obstructive sleep apnea and benefit benefits of the treatment.    Counseling time was greater than 10 minutes.        Follow Up   Return in about 6 months (around 2/25/2023).  Patient  was given instructions and counseling regarding his condition or for health maintenance advice. Please see specific information pulled into the AVS if appropriate.

## 2022-08-29 ENCOUNTER — OFFICE VISIT (OUTPATIENT)
Dept: NEUROSURGERY | Facility: CLINIC | Age: 42
End: 2022-08-29

## 2022-08-29 VITALS — WEIGHT: 265 LBS | HEIGHT: 72 IN | BODY MASS INDEX: 35.89 KG/M2

## 2022-08-29 DIAGNOSIS — M51.16 LUMBAR DISC HERNIATION WITH RADICULOPATHY: ICD-10-CM

## 2022-08-29 DIAGNOSIS — M51.36 DDD (DEGENERATIVE DISC DISEASE), LUMBAR: ICD-10-CM

## 2022-08-29 DIAGNOSIS — G89.29 CHRONIC BILATERAL LOW BACK PAIN WITH BILATERAL SCIATICA: ICD-10-CM

## 2022-08-29 DIAGNOSIS — M54.41 CHRONIC BILATERAL LOW BACK PAIN WITH BILATERAL SCIATICA: ICD-10-CM

## 2022-08-29 DIAGNOSIS — Z87.39 H/O DISCITIS: Primary | ICD-10-CM

## 2022-08-29 DIAGNOSIS — Z98.890 S/P LUMBAR DISCECTOMY: ICD-10-CM

## 2022-08-29 DIAGNOSIS — M54.10 RADICULAR LEG PAIN: ICD-10-CM

## 2022-08-29 DIAGNOSIS — M51.9 LUMBOSACRAL DISC DISEASE: ICD-10-CM

## 2022-08-29 DIAGNOSIS — M54.42 CHRONIC BILATERAL LOW BACK PAIN WITH BILATERAL SCIATICA: ICD-10-CM

## 2022-08-29 PROCEDURE — 99443 PR PHYS/QHP TELEPHONE EVALUATION 21-30 MIN: CPT | Performed by: PHYSICIAN ASSISTANT

## 2022-08-29 NOTE — PROGRESS NOTES
Wenceslao ANABEL Nava   1980   [id number]     08/29/2022     Telemedicine: KEITH Alanis  Location of Provider: Office  Type of Service: consult via telemedicine  Any physical exam was assisted by the patient.  All communications with the patient (verbal, audiovisual and written) were documented in the patient's medical record per documentation standards.  Mode of transmission: Telemedicine via 2-way interactive A/V telecommunication.  Basis for telemedicine: COVID-19    You have chosen to receive care through a telephone visit. Do you consent to use a telephone visit for your medical care today? yes    CC:left leg pain > right leg with hip pain.     HPI:  Standing can get numbness in the left leg, can sit and numbness resolves.  Leg pain is > back pain. after walking a distance he gets pain down the leg into the top of the foot.   He is not currently on any further antibiotic therapy.    Past medical history, allergies, medications, surgical history, social history, family history reviewed and updated.    Social History     Tobacco Use   Smoking Status Never Smoker   Smokeless Tobacco Current User   • Types: Snuff        Body mass index is 35.94 kg/m².       Physical examination:  The patient sounds well on the phone, no cough, SOB or wheezing is noted.    Review of new studies:  I have reviewed his prior MRI from a year ago.  No new studies.    Assessment/Plan:  Diagnoses and all orders for this visit:    1. H/O discitis (Primary)  -     MRI Lumbar Spine With & Without Contrast; Future  -     Sedimentation Rate; Future  -     C-reactive Protein; Future    2. DDD (degenerative disc disease), lumbar  -     MRI Lumbar Spine With & Without Contrast; Future  -     Sedimentation Rate; Future  -     C-reactive Protein; Future    3. Lumbar disc herniation with radiculopathy  -     MRI Lumbar Spine With & Without Contrast; Future  -     Sedimentation Rate; Future  -     C-reactive Protein; Future    4. S/P lumbar  discectomy  -     MRI Lumbar Spine With & Without Contrast; Future  -     Sedimentation Rate; Future  -     C-reactive Protein; Future    5. Radicular leg pain  -     MRI Lumbar Spine With & Without Contrast; Future  -     Sedimentation Rate; Future  -     C-reactive Protein; Future    6. Chronic bilateral low back pain with bilateral sciatica    7. Lumbosacral disc disease       I have ordered follow-up laboratory data as well as a new MRI of the lumbar spine.  We will plan on a follow-up telemedicine visit after his MRI scan is completed.  He has ask about starting back on his gabapentin, at this point I would need him to talk to pain management about continuing this medication.  Whether or not he needs further surgery would be determined after his MRI scan is completed.        This was an audio and video enabled telemedicine encounter. This visit has been rescheduled as a phone visit to comply with patient safety concerns in accordance with CDC recommendations.     Total time of discussion was 30 minutes.       KEITH Knox Sarah Beth, PA

## 2022-09-19 ENCOUNTER — HOSPITAL ENCOUNTER (OUTPATIENT)
Dept: MRI IMAGING | Facility: HOSPITAL | Age: 42
Discharge: HOME | End: 2022-09-19

## 2022-09-19 ENCOUNTER — LAB (OUTPATIENT)
Dept: LAB | Facility: HOSPITAL | Age: 42
End: 2022-09-19

## 2022-09-19 DIAGNOSIS — Z98.890 S/P LUMBAR DISCECTOMY: ICD-10-CM

## 2022-09-19 DIAGNOSIS — Z87.39 H/O DISCITIS: ICD-10-CM

## 2022-09-19 DIAGNOSIS — M54.10 RADICULAR LEG PAIN: ICD-10-CM

## 2022-09-19 DIAGNOSIS — M51.36 DDD (DEGENERATIVE DISC DISEASE), LUMBAR: ICD-10-CM

## 2022-09-19 DIAGNOSIS — M51.16 LUMBAR DISC HERNIATION WITH RADICULOPATHY: ICD-10-CM

## 2022-09-19 LAB
CREAT BLDA-MCNC: 1 MG/DL (ref 0.6–1.3)
CRP SERPL-MCNC: <0.3 MG/DL (ref 0–0.5)
ERYTHROCYTE [SEDIMENTATION RATE] IN BLOOD: 11 MM/HR (ref 0–15)

## 2022-09-19 PROCEDURE — 0 GADOBENATE DIMEGLUMINE 529 MG/ML SOLUTION: Performed by: PHYSICIAN ASSISTANT

## 2022-09-19 PROCEDURE — 82565 ASSAY OF CREATININE: CPT

## 2022-09-19 PROCEDURE — 85652 RBC SED RATE AUTOMATED: CPT

## 2022-09-19 PROCEDURE — 36415 COLL VENOUS BLD VENIPUNCTURE: CPT

## 2022-09-19 PROCEDURE — 72158 MRI LUMBAR SPINE W/O & W/DYE: CPT | Performed by: RADIOLOGY

## 2022-09-19 PROCEDURE — A9577 INJ MULTIHANCE: HCPCS | Performed by: PHYSICIAN ASSISTANT

## 2022-09-19 PROCEDURE — 86140 C-REACTIVE PROTEIN: CPT

## 2022-09-19 PROCEDURE — 72158 MRI LUMBAR SPINE W/O & W/DYE: CPT

## 2022-09-19 RX ADMIN — GADOBENATE DIMEGLUMINE 20 ML: 529 INJECTION, SOLUTION INTRAVENOUS at 10:30

## 2022-09-23 ENCOUNTER — OFFICE VISIT (OUTPATIENT)
Dept: NEUROSURGERY | Facility: CLINIC | Age: 42
End: 2022-09-23

## 2022-09-23 VITALS — BODY MASS INDEX: 35.89 KG/M2 | HEIGHT: 72 IN | WEIGHT: 265 LBS

## 2022-09-23 DIAGNOSIS — Z98.890 S/P LUMBAR DISCECTOMY: ICD-10-CM

## 2022-09-23 DIAGNOSIS — G89.29 CHRONIC BILATERAL LOW BACK PAIN WITH LEFT-SIDED SCIATICA: Primary | ICD-10-CM

## 2022-09-23 DIAGNOSIS — M54.10 RADICULAR LEG PAIN: ICD-10-CM

## 2022-09-23 DIAGNOSIS — M51.36 DDD (DEGENERATIVE DISC DISEASE), LUMBAR: ICD-10-CM

## 2022-09-23 DIAGNOSIS — M51.9 LUMBOSACRAL DISC DISEASE: ICD-10-CM

## 2022-09-23 DIAGNOSIS — M54.42 CHRONIC BILATERAL LOW BACK PAIN WITH LEFT-SIDED SCIATICA: Primary | ICD-10-CM

## 2022-09-23 DIAGNOSIS — M19.90 ARTHRITIS: ICD-10-CM

## 2022-09-23 PROCEDURE — 99443 PR PHYS/QHP TELEPHONE EVALUATION 21-30 MIN: CPT | Performed by: PHYSICIAN ASSISTANT

## 2022-09-23 NOTE — PROGRESS NOTES
Patient ID: Wenceslao Nava is a 42 y.o. male  [id number]    You have chosen to receive care through a telephone visit. Do you consent to use a telephone visit for your medical care today? YES    CC: back and left leg pain    History of Present Illness   This is a 42-year-old gentleman with degenerative disc disease at L2-3 and L3-4, he has a long history of back pain for some 3 to 4 years.  He presented in 2021 with back and right leg pain and underwent a right L3-4 laminotomy, medial facetectomy, foraminotomy and discectomy on 6/24/2021.  He developed a wound infection and underwent a washout on 8/9/2021.  The patient called the office with back and left leg pain with  numbness down leg to the top of the foot and he was sent for an MRI of the lumbar spine as well as laboratory data to follow-up on his prior wound infection.  We are reviewing his diagnostic studies today.    Past Medical History:   Diagnosis Date   • Arthritis    • CTS (carpal tunnel syndrome) January 2019   • Diabetes mellitus (HCC)     po meds only    • Elevated cholesterol    • Hypertension    • Knee pain, left    • Low back pain January 2019   • Lumbosacral disc disease January 2019   • Peripheral neuropathy January 2019   • Wound infection after surgery 07/2021       No Known Allergies    Current Outpatient Medications:   •  aspirin (aspirin) 81 MG EC tablet, Take 1 tablet by mouth Daily. (Patient taking differently: Take 81 mg by mouth Daily. Will stop for surgery), Disp: 30 tablet, Rfl: 0  •  atorvastatin (LIPITOR) 20 MG tablet, Take 20 mg by mouth Daily., Disp: , Rfl:   •  Bydureon BCise 2 MG/0.85ML auto-injector injection, , Disp: , Rfl:   •  cephalexin (KEFLEX) 500 MG capsule, , Disp: , Rfl:   •  diphenhydrAMINE (BENADRYL) 50 MG capsule, , Disp: , Rfl:   •  docusate sodium (Colace) 100 MG capsule, Take 1 capsule by mouth 3 (Three) Times a Day As Needed for Constipation., Disp: 30 capsule, Rfl: 0  •  Dulaglutide (Trulicity) 0.75  MG/0.5ML solution pen-injector, Inject 0.75 mg under the skin into the appropriate area as directed 1 (One) Time Per Week. fridays, Disp: , Rfl:   •  EPINEPHrine (EPIPEN JR) 0.15 MG/0.3ML solution auto-injector injection, , Disp: , Rfl:   •  FLUoxetine (PROzac) 40 MG capsule, Take 40 mg by mouth Daily., Disp: , Rfl:   •  gabapentin (NEURONTIN) 800 MG tablet, Take 1 tablet by mouth 3 (Three) Times a Day., Disp: 90 tablet, Rfl: 1  •  HYDROcodone-acetaminophen (NORCO) 7.5-325 MG per tablet, , Disp: , Rfl:   •  hydrOXYzine (ATARAX) 25 MG tablet, , Disp: , Rfl:   •  lisinopril-hydrochlorothiazide (PRINZIDE,ZESTORETIC) 20-12.5 MG per tablet, , Disp: , Rfl:   •  losartan-hydrochlorothiazide (HYZAAR) 100-25 MG per tablet, losartan 100 mg-hydrochlorothiazide 25 mg tablet  Take by oral route., Disp: , Rfl:   •  metFORMIN ER (GLUCOPHAGE-XR) 750 MG 24 hr tablet, Take 750 mg by mouth 2 (two) times a day., Disp: , Rfl:   •  nabumetone (RELAFEN) 500 MG tablet, Take 500 mg by mouth 2 (Two) Times a Day As Needed for Mild Pain ., Disp: , Rfl:   •  oxyCODONE-acetaminophen (PERCOCET) 5-325 MG per tablet, Take 1 tablet by mouth Every 8 (Eight) Hours As Needed for Moderate Pain ., Disp: 25 tablet, Rfl: 0  •  sodium chloride 0.9 % solution, , Disp: , Rfl:   •  tiZANidine (Zanaflex) 4 MG tablet, Take 1 tablet by mouth Every 8 (Eight) Hours As Needed for Muscle Spasms., Disp: 30 tablet, Rfl: 1  Social History     Tobacco Use   • Smoking status: Never Smoker   • Smokeless tobacco: Current User     Types: Snuff   Vaping Use   • Vaping Use: Never used   Substance Use Topics   • Alcohol use: Not Currently     Comment: RARELY   • Drug use: No     Review of Systems   Constitutional: Negative for activity change, appetite change, chills, diaphoresis, fatigue, fever and unexpected weight change.   HENT: Negative for congestion, dental problem, drooling, ear discharge, ear pain, facial swelling, hearing loss, mouth sores, nosebleeds, postnasal drip,  "rhinorrhea, sinus pressure, sinus pain, sneezing, sore throat, tinnitus, trouble swallowing and voice change.    Eyes: Negative for photophobia, pain, discharge, redness, itching and visual disturbance.   Respiratory: Negative for apnea, cough, choking, chest tightness, shortness of breath, wheezing and stridor.    Cardiovascular: Negative for chest pain, palpitations and leg swelling.   Gastrointestinal: Positive for rectal pain. Negative for abdominal distention, abdominal pain, anal bleeding, blood in stool, constipation, diarrhea, nausea and vomiting.   Endocrine: Negative for cold intolerance, heat intolerance, polydipsia, polyphagia and polyuria.   Genitourinary: Negative for decreased urine volume, difficulty urinating, dysuria, enuresis, flank pain, frequency, genital sores, hematuria, penile discharge, penile pain, penile swelling, scrotal swelling, testicular pain and urgency.   Musculoskeletal: Positive for arthralgias, back pain, myalgias, neck pain and neck stiffness. Negative for gait problem and joint swelling.   Skin: Negative for color change, pallor, rash and wound.   Allergic/Immunologic: Negative for environmental allergies, food allergies and immunocompromised state.   Neurological: Positive for dizziness, tremors, weakness and numbness. Negative for seizures, syncope, facial asymmetry, speech difficulty, light-headedness and headaches.   Hematological: Negative for adenopathy. Does not bruise/bleed easily.   Psychiatric/Behavioral: Negative for agitation, behavioral problems, confusion, decreased concentration, dysphoric mood, hallucinations, self-injury, sleep disturbance and suicidal ideas. The patient is not nervous/anxious and is not hyperactive.    All other systems reviewed and are negative.       Ht 182.9 cm (72\")   Wt 120 kg (265 lb)   BMI 35.94 kg/m²     PE:  The patient sounds well on the phone.  The patient reports that he does not have any weakness in his legs.  Bladder function " is normal.    Independent Review of Radiographic Studies:   Stenosis L2-3 and L3-4 facet arthropathy.      Medical Decision Making:   Impression:  Ongoing degenerative disc disease at L2-3 and L3-4.  There is collapse of the disc space at L3-4. At L2-3 there is significant degenerative disc disease with broad-based disc bulging mild facet arthropathy and moderate bilateral foraminal narrowing.  At L3-4 there is collapse of the disc space with Modic changes within the bone there is bulging of the disc that is broad-based and causes bilateral foraminal narrowing.    Plan:  1.  Degenerative disc disease, L2-3 and L3-4  2.  Bilateral foraminal narrowing L2-3 and L3-4.  3.  Chronic low back pain.  4.  Left leg pain    With the new studies which shows ongoing bilateral foraminal narrowing at L2-3 and L3-4 I have recommended follow-up with pain management for steroid injection.  I have reviewed the patient's laboratory data which is back to normal and no signs of infection.  I would like to have a telemedicine visit with the patient after his injection and see how his leg pain is doing.    Class 2 Severe Obesity (BMI >=35 and <=39.9). Obesity-related health conditions include the following: osteoarthritis. Obesity is unchanged. BMI is is above average; BMI management plan is completed.     Social History    Tobacco Use      Smoking status: Never Smoker      Smokeless tobacco: Current User        Types: Snuff       This visit has been rescheduled as a phone visit to comply with patient safety concerns in accordance with CDC recommendation.  Total time of discussion was 30 minutes.    Danita Peña, PAC

## 2023-03-01 ENCOUNTER — OFFICE VISIT (OUTPATIENT)
Dept: PULMONOLOGY | Facility: CLINIC | Age: 43
End: 2023-03-01
Payer: MEDICARE

## 2023-03-01 VITALS
HEIGHT: 72 IN | SYSTOLIC BLOOD PRESSURE: 152 MMHG | HEART RATE: 75 BPM | OXYGEN SATURATION: 99 % | TEMPERATURE: 98 F | WEIGHT: 246.6 LBS | BODY MASS INDEX: 33.4 KG/M2 | DIASTOLIC BLOOD PRESSURE: 96 MMHG

## 2023-03-01 DIAGNOSIS — G47.33 OSA (OBSTRUCTIVE SLEEP APNEA): Primary | ICD-10-CM

## 2023-03-01 DIAGNOSIS — G47.00 INSOMNIA, UNSPECIFIED TYPE: ICD-10-CM

## 2023-03-01 DIAGNOSIS — E66.9 OBESITY (BMI 30-39.9): ICD-10-CM

## 2023-03-01 PROCEDURE — 99214 OFFICE O/P EST MOD 30 MIN: CPT | Performed by: NURSE PRACTITIONER

## 2023-03-01 RX ORDER — GABAPENTIN 600 MG/1
TABLET ORAL
COMMUNITY
Start: 2023-02-17

## 2023-03-01 RX ORDER — MOXIFLOXACIN 5 MG/ML
SOLUTION/ DROPS OPHTHALMIC
COMMUNITY
Start: 2023-02-07

## 2023-03-01 RX ORDER — NEOMYCIN SULFATE, POLYMYXIN B SULFATE, AND DEXAMETHASONE 3.5; 10000; 1 MG/G; [USP'U]/G; MG/G
OINTMENT OPHTHALMIC
COMMUNITY
Start: 2023-02-07

## 2023-03-01 RX ORDER — AMLODIPINE BESYLATE 5 MG/1
TABLET ORAL
COMMUNITY
Start: 2023-02-02 | End: 2023-03-01

## 2023-03-01 RX ORDER — SEMAGLUTIDE 2.68 MG/ML
INJECTION, SOLUTION SUBCUTANEOUS
COMMUNITY
Start: 2023-02-24

## 2023-03-01 NOTE — PROGRESS NOTES
"Chief Complaint  Sleep Apnea (NONCOMPLIANT, UNABLE TO TOLERATE MASK. WOULD LIKE A NASAL MASK ONLY. )    Subjective        Wenceslao Nava presents to [facility] PULMONARY & CRITICAL CARE MEDICINE  History of Present Illness     Mr. Nava is a 42 year old male with a medical history significant for arthritis, diabetes, hypertension, and RYANNE.    He presents today for follow up on sleep apnea.  He state that he has not been able to use his cpap because he has been unable to tolerate his mask.  He would like to use a nasal mask.  He is currently taking hydroxyzine to help him sleep.    Objective   Vital Signs:  /96 (BP Location: Left arm, Patient Position: Sitting)   Pulse 75   Temp 98 °F (36.7 °C)   Ht 182.9 cm (72\")   Wt 112 kg (246 lb 9.6 oz)   SpO2 99%   BMI 33.44 kg/m²   Estimated body mass index is 33.44 kg/m² as calculated from the following:    Height as of this encounter: 182.9 cm (72\").    Weight as of this encounter: 112 kg (246 lb 9.6 oz).       BMI is >= 30 and <35. (Class 1 Obesity). The following options were offered after discussion;: exercise counseling/recommendations and nutrition counseling/recommendations      Physical Exam     GENERAL APPEARANCE: Well developed, well nourished, alert and cooperative, and appears to be in no acute distress.    HEAD: normocephalic. Atraumatic.    EYES: PERRL, EOMI. Vision is grossly intact.    THROAT: Oral cavity and pharynx normal. No inflammation, swelling, exudate, or lesions.     NECK: Neck supple.  No thyromegaly.    CARDIAC: Normal S1 and S2. No S3, S4 or murmurs. Rhythm is regular.     RESPIRATORY:Bilateral air entry positive. Bilateral diminished breath sounds. No wheezing, crackles or rhonchi noted.    GI: Positive bowel sounds. Soft, nondistended, nontender.     MUSCULOSKELETAL: No significant deformity or joint abnormality. No edema. Peripheral pulses intact. No varicosities.    NEUROLOGICAL: Strength and sensation " symmetric and intact throughout.     PSYCHIATRIC: The mental examination revealed the patient was oriented to person, place, and time.     Result Review :  The following data was reviewed by: MAGED Lewis on 03/01/2023:  Common labs    Common Labs 9/19/22   Creatinine 1.00      Comments are available for some flowsheets but are not being displayed.                        Assessment and Plan   Diagnoses and all orders for this visit:    1. RYANNE (obstructive sleep apnea) (Primary)    2. Obesity (BMI 30-39.9)    3. Insomnia, unspecified type    Other orders  -     hydrOXYzine (ATARAX) 25 MG tablet; Take 1-2 tablets nightly for insomnia.  Dispense: 60 tablet; Refill: 5              He has not been able to use cpap due to his mask.  He would like to try a nasal mask.  Will also send cpap supplies to DME.   Patient was educated on positive airway pressure treatment.  As per CMS guidelines, more than 4 hours on 70% of observed nights is considered adherence. Patient was strongly encouraged to use CPAP as much as possible during sleep as more CPAP use is equal to more benefit. Use of heated humidification in positive airway pressure treatment to improve the adherence to the device.  In case of claustrophobia, we will provide the patient cognitive behavioral therapy and desensitization. Oral appliances use will be discussed with the patient in case of mild to moderate sleep apnea or if the patient with severe disease fail positive airway pressure treatment.       The patient was extensively educated on the consequences of untreated obstructive sleep apnea namely cardiovascular/metabolic disorder, neurocognitive deficit, daytime sleepiness, motor vehicle accidents, depression, mood disorders and reduced quality of life.  At the end of conversation, the patient voices understanding of the disease process and treatment modality.  Patient also understands the risk of untreated obstructive sleep apnea and benefit  benefits of the treatment.    Counseling time was greater than 10 minutes.      Continue hydroxyzine for insomnia.  Will send refills.      Wenceslao LITTLE Welch  reports that he has never smoked. He has been exposed to tobacco smoke. His smokeless tobacco use includes snuff..We discussed cessation of smokeless tobacco.               Follow Up   Return in about 3 months (around 6/1/2023).  Patient was given instructions and counseling regarding his condition or for health maintenance advice. Please see specific information pulled into the AVS if appropriate.

## 2023-03-06 RX ORDER — HYDROXYZINE HYDROCHLORIDE 25 MG/1
TABLET, FILM COATED ORAL
Qty: 60 TABLET | Refills: 5 | Status: SHIPPED | OUTPATIENT
Start: 2023-03-06

## 2024-09-05 NOTE — TELEPHONE ENCOUNTER
11/17/21 8:51a   Pt called requesting a refill on his Gabapentin. Pt aware that this was approved and sent to his pharmacy yesterday.    The resident's documentation has been prepared under my direction and personally reviewed by me in its entirety. I confirm that the note above accurately reflects all work, treatment, procedures, and medical decision making performed by me. Please see JACINTO Wilson MD PEM Attending

## 2025-01-29 ENCOUNTER — TRANSCRIBE ORDERS (OUTPATIENT)
Dept: ADMINISTRATIVE | Facility: HOSPITAL | Age: 45
End: 2025-01-29
Payer: MEDICARE

## 2025-01-29 DIAGNOSIS — R07.9 CHEST PAIN, UNSPECIFIED TYPE: Primary | ICD-10-CM

## 2025-02-06 ENCOUNTER — TRANSCRIBE ORDERS (OUTPATIENT)
Dept: ADMINISTRATIVE | Facility: HOSPITAL | Age: 45
End: 2025-02-06
Payer: MEDICARE

## 2025-02-06 DIAGNOSIS — R07.9 CHEST PAIN, UNSPECIFIED TYPE: Primary | ICD-10-CM

## 2025-07-28 ENCOUNTER — OFFICE VISIT (OUTPATIENT)
Dept: ORTHOPEDIC SURGERY | Facility: CLINIC | Age: 45
End: 2025-07-28
Payer: MEDICARE

## 2025-07-28 VITALS
SYSTOLIC BLOOD PRESSURE: 126 MMHG | HEART RATE: 66 BPM | WEIGHT: 250 LBS | HEIGHT: 72 IN | BODY MASS INDEX: 33.86 KG/M2 | DIASTOLIC BLOOD PRESSURE: 84 MMHG

## 2025-07-28 DIAGNOSIS — M65.351 TRIGGER LITTLE FINGER OF RIGHT HAND: Primary | ICD-10-CM

## 2025-07-28 PROBLEM — M65.352 TRIGGER LITTLE FINGER OF LEFT HAND: Status: ACTIVE | Noted: 2025-07-28

## 2025-07-28 RX ORDER — FLUTICASONE PROPIONATE 50 MCG
SPRAY, SUSPENSION (ML) NASAL
COMMUNITY
Start: 2025-07-07

## 2025-07-28 RX ORDER — IBUPROFEN 800 MG/1
800 TABLET, FILM COATED ORAL
COMMUNITY
Start: 2025-07-14

## 2025-07-28 RX ORDER — AMLODIPINE BESYLATE 5 MG/1
TABLET ORAL
COMMUNITY

## 2025-07-28 RX ORDER — GLIPIZIDE 5 MG/1
TABLET ORAL
COMMUNITY

## 2025-07-28 RX ADMIN — LIDOCAINE HYDROCHLORIDE 2 ML: 10 INJECTION, SOLUTION EPIDURAL; INFILTRATION; INTRACAUDAL; PERINEURAL at 16:49

## 2025-07-28 RX ADMIN — METHYLPREDNISOLONE ACETATE 40 MG: 40 INJECTION, SUSPENSION INTRA-ARTICULAR; INTRALESIONAL; INTRAMUSCULAR; SOFT TISSUE at 16:49

## 2025-07-28 NOTE — PROGRESS NOTES
New Patient Visit      Patient: Wenceslao Nava  YOB: 1980  Date of Encounter: 07/28/2025        Chief Complaint:   Chief Complaint   Patient presents with   • Right Hand - Pain, Initial Evaluation           HPI:   Wenceslao Nava, 45 y.o. male, referred by Flores Chua PA presents for evaluation of triggering of his right hand fifth finger and fourth finger of approximately 3 months reports no trauma.        Active Problem List:  Patient Active Problem List   Diagnosis   • Hypertension   • Arthritis   • Chronic low back pain with bilateral sciatica   • DDD (degenerative disc disease), lumbar   • Spinal stenosis, lumbar region, with neurogenic claudication   • Physical deconditioning   • Cervical spondylosis without myelopathy   • HNP (herniated nucleus pulposus), lumbar   • Wound infection   • Wound infection after surgery   • Calculus of gallbladder   • Neck pain   • Low back pain   • S/P lumbar discectomy   • H/O discitis   • Radicular leg pain   • Lumbosacral disc disease   • Trigger little finger of left hand           Past Medical History:  Past Medical History:   Diagnosis Date   • Arthritis    • CTS (carpal tunnel syndrome) January 2019   • Diabetes mellitus     po meds only    • Elevated cholesterol    • Hypertension    • Knee pain, left    • Low back pain January 2019   • Lumbosacral disc disease January 2019   • Melanoma     LEFT EYE   • Peripheral neuropathy January 2019   • Wound infection after surgery 07/2021           Past Surgical History:  Past Surgical History:   Procedure Laterality Date   • BACK SURGERY  Na   • CHOLECYSTECTOMY     • EYE SURGERY Left 02/15/2023   • LUMBAR DISCECTOMY Right 06/24/2021    Procedure: LAMINOTOMY, FORAMINOTOMY, DISCECTOMY, L3-4 RIGHT;  Surgeon: Sreekanth Sorto MD;  Location: [facility];  Service: Neurosurgery;  Laterality: Right;   • LUMBAR DISCECTOMY N/A 08/09/2021    Procedure: INCISION AND DRAINAGE WOUND;  Surgeon: Sreekanth Sorto MD;   Location: [facility];  Service: Neurosurgery;  Laterality: N/A;   • NECK SURGERY      May 2019: Believes it to be an ACDF, RAVINDRA Jackman           Family History:  Family History   Problem Relation Age of Onset   • Diabetes Brother    • Hypertension Brother    • Developmental Disability Brother    • Diabetes Mother          Social History:  Social History     Socioeconomic History   • Marital status:    Tobacco Use   • Smoking status: Never     Passive exposure: Current   • Smokeless tobacco: Current     Types: Snuff   Vaping Use   • Vaping status: Never Used   Substance and Sexual Activity   • Alcohol use: Not Currently     Comment: RARELY   • Drug use: No   • Sexual activity: Not Currently     Partners: Female     Birth control/protection: None     Body mass index is 33.91 kg/m².  BMI is >= 30 and <35. (Class 1 Obesity). The following options were offered after discussion;: weight loss educational material (shared in after visit summary)      Medications:  Current Outpatient Medications   Medication Sig Dispense Refill   • atorvastatin (LIPITOR) 20 MG tablet Take 1 tablet by mouth Daily.     • FLUoxetine (PROzac) 40 MG capsule Take 1 capsule by mouth Daily.     • fluticasone (FLONASE) 50 MCG/ACT nasal spray      • gabapentin (NEURONTIN) 600 MG tablet      • HYDROcodone-acetaminophen (NORCO) 7.5-325 MG per tablet      • hydrOXYzine (ATARAX) 25 MG tablet Take 1-2 tablets nightly for insomnia. 60 tablet 5   • ibuprofen (ADVIL,MOTRIN) 800 MG tablet Take 1 tablet by mouth.     • lisinopril-hydrochlorothiazide (PRINZIDE,ZESTORETIC) 20-12.5 MG per tablet      • losartan-hydrochlorothiazide (HYZAAR) 100-25 MG per tablet losartan 100 mg-hydrochlorothiazide 25 mg tablet   Take by oral route.     • metFORMIN ER (GLUCOPHAGE-XR) 750 MG 24 hr tablet Take 1 tablet by mouth 2 (two) times a day.     • tiZANidine (Zanaflex) 4 MG tablet Take 1 tablet by mouth Every 8 (Eight) Hours As Needed for Muscle Spasms. 30  tablet 1   • amLODIPine (NORVASC) 5 MG tablet amlodipine 5 mg tablet     • glipizide (GLUCOTROL) 5 MG tablet      • moxifloxacin (VIGAMOX) 0.5 % ophthalmic solution  (Patient not taking: Reported on 7/28/2025)     • nabumetone (RELAFEN) 500 MG tablet Take 1 tablet by mouth 2 (Two) Times a Day As Needed for Mild Pain.     • neomycin-polymyxin-dexamethamethasone (POLYDEX) 3.5-98939-8.1 ointment ophthalmic ointment  (Patient not taking: Reported on 7/28/2025)     • Ozempic, 2 MG/DOSE, 8 MG/3ML solution pen-injector  (Patient not taking: Reported on 7/28/2025)       No current facility-administered medications for this visit.         Allergies:  No Known Allergies      Review of Systems:   Review of Systems   Constitutional: Negative.  Negative for chills, fatigue and fever.   HENT: Negative.  Negative for congestion, facial swelling, mouth sores, sore throat, trouble swallowing and voice change.    Eyes: Negative.  Negative for pain, discharge and visual disturbance.   Respiratory: Negative.  Negative for apnea, cough, chest tightness, shortness of breath and wheezing.    Cardiovascular: Negative.  Negative for chest pain, palpitations and leg swelling.   Gastrointestinal: Negative.  Negative for abdominal distention, abdominal pain, anal bleeding, constipation, diarrhea, nausea and vomiting.   Endocrine: Negative.  Negative for cold intolerance, heat intolerance, polyphagia and polyuria.   Genitourinary: Negative.  Negative for difficulty urinating, dysuria, flank pain and hematuria.   Musculoskeletal:  Positive for arthralgias, back pain and neck pain.   Skin: Negative.  Negative for color change, pallor, rash and wound.   Allergic/Immunologic: Negative.  Negative for environmental allergies, food allergies and immunocompromised state.   Neurological: Negative.  Negative for dizziness, tremors, seizures, syncope, facial asymmetry, speech difficulty, weakness, light-headedness, numbness and headaches.   Hematological:  "Negative.  Negative for adenopathy. Does not bruise/bleed easily.   Psychiatric/Behavioral:  Positive for dysphoric mood. Negative for behavioral problems, confusion, self-injury, sleep disturbance and suicidal ideas. The patient is not nervous/anxious.          Physical Exam:   Physical Exam  GENERAL: 45 y.o. male, alert and oriented X 3 in no acute distress.   Visit Vitals  /84   Pulse 66   Ht 182.9 cm (72\")   Wt 113 kg (250 lb)   BMI 33.91 kg/m²   BMI is >= 30 and <35. (Class 1 Obesity). The following options were offered after discussion;: weight loss educational material (shared in after visit summary)      GENERAL APPEARANCE: Awake, alert & oriented, in no acute distress and well developed, well nourished.   PSYCH: Normal mood and affect  LUNGS: Breathing nonlabored, no wheezing  EYES: Sclera anicteric, pupils equal  CARDIOVASCULAR: Palpable pulses. Capillary refill less than 2 seconds  INTEGUMENTARY: Skin intact, co clubbing, cyanosis  NEUROLOGIC: Normal Sensation  MUSCULOSKELETAL:  Orthopedic Examination: Right hand reveals tenderness in his palm directly over A1 pulley fifth finger with.  Minimal discomfort over the fourth finger A1 pulley with minimal if any triggering.          Assessment & Plan:   45 y.o. male presents with right hand complaints with fairly obvious trigger fifth finger and mild involvement of the fourth he is initially provided steroid injection fifth finger flexor tendon sheath region of A1 pulley following the injection he had complete relief no further pain no further triggering the fourth finger reevaluated demonstrated no triggering.  He will return as needed.        ICD-10-CM ICD-9-CM   1. Trigger little finger of right hand  M65.351 727.03         - Hand/Upper Extremity Injection: R small A1 for trigger finger on 7/28/2025 4:49 PM  Indications: pain  Details: 25 G needle, volar approach  Medications: 2 mL lidocaine PF 1% 1 %; 40 mg methylPREDNISolone acetate 40 " MG/ML  Outcome: tolerated well, no immediate complications  Procedure, treatment alternatives, risks and benefits explained, specific risks discussed. Consent was given by the patient. Immediately prior to procedure a time out was called to verify the correct patient, procedure, equipment, support staff and site/side marked as required. Patient was prepped and draped in the usual sterile fashion.         Cc:   Flores Chua PA                This document has been electronically signed by Mik Crawford MD   July 31, 2025 16:49 EDT

## 2025-08-01 RX ORDER — LIDOCAINE HYDROCHLORIDE 10 MG/ML
2 INJECTION, SOLUTION EPIDURAL; INFILTRATION; INTRACAUDAL; PERINEURAL
Status: COMPLETED | OUTPATIENT
Start: 2025-07-28 | End: 2025-07-28

## 2025-08-01 RX ORDER — METHYLPREDNISOLONE ACETATE 40 MG/ML
40 INJECTION, SUSPENSION INTRA-ARTICULAR; INTRALESIONAL; INTRAMUSCULAR; SOFT TISSUE
Status: COMPLETED | OUTPATIENT
Start: 2025-07-28 | End: 2025-07-28

## 2025-08-05 DIAGNOSIS — M25.561 RIGHT KNEE PAIN, UNSPECIFIED CHRONICITY: Primary | ICD-10-CM

## 2025-08-18 ENCOUNTER — OFFICE VISIT (OUTPATIENT)
Dept: ORTHOPEDIC SURGERY | Facility: CLINIC | Age: 45
End: 2025-08-18
Payer: MEDICARE

## 2025-08-18 ENCOUNTER — HOSPITAL ENCOUNTER (OUTPATIENT)
Dept: GENERAL RADIOLOGY | Facility: HOSPITAL | Age: 45
Discharge: HOME | End: 2025-08-18
Admitting: ORTHOPAEDIC SURGERY
Payer: MEDICARE

## 2025-08-18 VITALS — BODY MASS INDEX: 33.74 KG/M2 | WEIGHT: 249.12 LBS | HEIGHT: 72 IN

## 2025-08-18 DIAGNOSIS — M25.561 RIGHT KNEE PAIN, UNSPECIFIED CHRONICITY: ICD-10-CM

## 2025-08-18 DIAGNOSIS — M25.561 RIGHT KNEE PAIN, UNSPECIFIED CHRONICITY: Primary | ICD-10-CM

## 2025-08-18 PROCEDURE — 73562 X-RAY EXAM OF KNEE 3: CPT

## 2025-08-18 RX ADMIN — METHYLPREDNISOLONE ACETATE 40 MG: 40 INJECTION, SUSPENSION INTRA-ARTICULAR; INTRALESIONAL; INTRAMUSCULAR; SOFT TISSUE at 10:34

## 2025-08-18 RX ADMIN — LIDOCAINE HYDROCHLORIDE 5 ML: 10 INJECTION, SOLUTION EPIDURAL; INFILTRATION; INTRACAUDAL; PERINEURAL at 10:34

## 2025-08-21 ENCOUNTER — PATIENT ROUNDING (BHMG ONLY) (OUTPATIENT)
Dept: ORTHOPEDIC SURGERY | Facility: CLINIC | Age: 45
End: 2025-08-21
Payer: MEDICARE

## 2025-08-22 RX ORDER — METHYLPREDNISOLONE ACETATE 40 MG/ML
40 INJECTION, SUSPENSION INTRA-ARTICULAR; INTRALESIONAL; INTRAMUSCULAR; SOFT TISSUE
Status: COMPLETED | OUTPATIENT
Start: 2025-08-18 | End: 2025-08-18

## 2025-08-22 RX ORDER — LIDOCAINE HYDROCHLORIDE 10 MG/ML
5 INJECTION, SOLUTION EPIDURAL; INFILTRATION; INTRACAUDAL; PERINEURAL
Status: COMPLETED | OUTPATIENT
Start: 2025-08-18 | End: 2025-08-18

## (undated) DEVICE — ADHS SKIN PREMIERPRO EXOFIN TOPICAL HI/VISC .5ML

## (undated) DEVICE — DRSNG WND BORDR/ADHS NONADHR/GZ LF 4X4IN STRL

## (undated) DEVICE — CABL BIPOL MEGADYNE 12FT DISP

## (undated) DEVICE — ACCY PA700 LUBRICANT DIFFUSER MR7 4 PACK: Brand: MIDAS REX

## (undated) DEVICE — ELECTRD BLD EZ CLN STD 2.5IN

## (undated) DEVICE — ANTIBACTERIAL UNDYED BRAIDED (POLYGLACTIN 910), SYNTHETIC ABSORBABLE SUTURE: Brand: COATED VICRYL

## (undated) DEVICE — GLV SURG PREMIERPRO MIC LTX PF SZ7.5 BRN

## (undated) DEVICE — PENCL ROCKRSWCH MEGADYNE W/HOLSTR 10FT SS

## (undated) DEVICE — PK NEURO DISC 10

## (undated) DEVICE — PATIENT RETURN ELECTRODE, SINGLE-USE, CONTACT QUALITY MONITORING, ADULT, WITH 9FT CORD, FOR PATIENTS WEIGING OVER 33LBS. (15KG): Brand: MEGADYNE

## (undated) DEVICE — SUT VIC PLS CTD ANTIB BR 3/0 8/18IN 45CM

## (undated) DEVICE — TOOL 14MH30D LEGEND 14CM 3MM MH DIAM: Brand: MIDAS REX ™

## (undated) DEVICE — GLV SURG PREMIERPRO MIC LTX PF SZ6.5 BRN

## (undated) DEVICE — C-ARM DRAPE: Brand: DEROYAL

## (undated) DEVICE — HDRST INTUB GENTLETOUCH SLOT 7IN RT

## (undated) DEVICE — STRAP POSTN KN/BDY FM 5X72IN DISP

## (undated) DEVICE — ELECTRD BLD EZ CLN MOD 4IN